# Patient Record
Sex: FEMALE | Race: WHITE | NOT HISPANIC OR LATINO | Employment: FULL TIME | URBAN - METROPOLITAN AREA
[De-identification: names, ages, dates, MRNs, and addresses within clinical notes are randomized per-mention and may not be internally consistent; named-entity substitution may affect disease eponyms.]

---

## 2018-07-31 ENCOUNTER — OFFICE VISIT (OUTPATIENT)
Dept: OBSTETRICS AND GYNECOLOGY | Facility: CLINIC | Age: 28
End: 2018-07-31
Payer: COMMERCIAL

## 2018-07-31 VITALS
HEIGHT: 65 IN | WEIGHT: 144.81 LBS | SYSTOLIC BLOOD PRESSURE: 112 MMHG | BODY MASS INDEX: 24.12 KG/M2 | DIASTOLIC BLOOD PRESSURE: 72 MMHG

## 2018-07-31 DIAGNOSIS — Z80.3 FAMILY HISTORY OF BREAST CANCER: ICD-10-CM

## 2018-07-31 DIAGNOSIS — Z01.419 WELL WOMAN EXAM WITH ROUTINE GYNECOLOGICAL EXAM: Primary | ICD-10-CM

## 2018-07-31 DIAGNOSIS — Z80.0 FAMILY HISTORY OF COLON CANCER IN FATHER: ICD-10-CM

## 2018-07-31 PROCEDURE — 99999 PR PBB SHADOW E&M-NEW PATIENT-LVL III: CPT | Mod: PBBFAC,,, | Performed by: OBSTETRICS & GYNECOLOGY

## 2018-07-31 PROCEDURE — 99385 PREV VISIT NEW AGE 18-39: CPT | Mod: S$GLB,,, | Performed by: OBSTETRICS & GYNECOLOGY

## 2018-07-31 NOTE — PROGRESS NOTES
History & Physical  Gynecology      SUBJECTIVE:     Chief Complaint: Annual Exam       History of Present Illness:  Annual Exam-Premenopausal  Patient presents for annual exam. The patient has no complaints today. Menstrual cycles are irregular with hugo.  The patient is sexually active. GYN screening history: last pap: approximate date  and was normal. The patient participates in regular exercise: yes.  The patient does not smoke.      Contraception: IUD, Aug 2016    FH:  Breast cancer: maternal aunt/grandmother  Colon cancer: Dad age 40  Ovarian cancer: none    Review of patient's allergies indicates:  No Known Allergies    Past Medical History:   Diagnosis Date    Abnormal Pap smear of cervix      Past Surgical History:   Procedure Laterality Date    WISDOM TOOTH EXTRACTION       OB History      Para Term  AB Living    0 0 0 0 0 0    SAB TAB Ectopic Multiple Live Births    0 0 0 0 0        Family History   Problem Relation Age of Onset    Breast cancer Maternal Grandmother     Colon cancer Father     Diabetes Father     Hypertension Father     Breast cancer Maternal Aunt     Eclampsia Neg Hx     Stroke Neg Hx     Miscarriages / Stillbirths Neg Hx     Ovarian cancer Neg Hx      labor Neg Hx      Social History   Substance Use Topics    Smoking status: Never Smoker    Smokeless tobacco: Never Used    Alcohol use Yes       No current outpatient prescriptions on file.     No current facility-administered medications for this visit.          Review of Systems:  Review of Systems   Constitutional: Negative for activity change, appetite change and fever.   Respiratory: Negative for shortness of breath.    Cardiovascular: Negative for chest pain.   Gastrointestinal: Negative for abdominal pain, constipation, diarrhea, nausea and vomiting.   Genitourinary: Negative for menorrhagia, menstrual problem, pelvic pain, vaginal bleeding, vaginal discharge and vaginal pain.   Neurological:  Negative for numbness and headaches.   Breast: Negative for breast pain and nipple discharge       OBJECTIVE:     Physical Exam:  Physical Exam   Constitutional: She is oriented to person, place, and time. She appears well-developed and well-nourished.   Neck: Normal range of motion. Neck supple. No tracheal deviation present. No thyromegaly present.   Cardiovascular: Normal rate, regular rhythm and normal heart sounds.    Pulmonary/Chest: Effort normal and breath sounds normal. Right breast exhibits no inverted nipple, no mass, no nipple discharge, no skin change and no tenderness. Left breast exhibits no inverted nipple, no mass, no nipple discharge, no skin change and no tenderness. Breasts are symmetrical.   Abdominal: Soft.   Genitourinary: Vagina normal and uterus normal. No labial fusion. There is no rash, tenderness, lesion or injury on the right labia. There is no rash, tenderness, lesion or injury on the left labia. Uterus is not deviated, not enlarged, not fixed and not tender. Cervix exhibits no motion tenderness, no discharge and no friability. Right adnexum displays no mass, no tenderness and no fullness. Left adnexum displays no mass, no tenderness and no fullness. No erythema, tenderness or bleeding in the vagina. No foreign body in the vagina. No signs of injury around the vagina. No vaginal discharge found.   Genitourinary Comments: Urethra: normal appearing urethra with no masses, tenderness or lesions  Urethral meatus: normal size, anterior vaginal wall with no prolapse, no lesions   Neurological: She is alert and oriented to person, place, and time.   Psychiatric: She has a normal mood and affect. Her behavior is normal. Judgment and thought content normal.   Nursing note and vitals reviewed.      Chaperoned by: Wilbert    ASSESSMENT:       ICD-10-CM ICD-9-CM    1. Well woman exam with routine gynecological exam Z01.419 V72.31    2. Family history of colon cancer in father Z80.0 V16.0    3.  Family history of breast cancer Z80.3 V16.3           Plan:      Shira was seen today for annual exam.    Diagnoses and all orders for this visit:    Well woman exam with routine gynecological exam    Family history of colon cancer in father    Family history of breast cancer        No orders of the defined types were placed in this encounter.      Well Woman:  - Pap smear up to date  - Birth control: hugo  - GC/CT:n/a  - Mammogram: due age 41- discussed TC calculation at age 31  - Smoking cessation: n/a  - Labs: none required   - Vaccines: HPV completed  - Exercise counseling    FH of colon cancer:  - discussed early screening with colonoscopy at age 41; patient aware    Follow up in one year for annual or prn.    Kayce Fong

## 2018-08-15 ENCOUNTER — TELEPHONE (OUTPATIENT)
Dept: INTERNAL MEDICINE | Facility: CLINIC | Age: 28
End: 2018-08-15

## 2018-08-15 NOTE — TELEPHONE ENCOUNTER
lvm for pt to call office back in regards of   Relocation of appt on 8/23/18.    Address and number is in appt notes

## 2018-08-23 ENCOUNTER — OFFICE VISIT (OUTPATIENT)
Dept: PRIMARY CARE CLINIC | Facility: CLINIC | Age: 28
End: 2018-08-23
Attending: FAMILY MEDICINE
Payer: COMMERCIAL

## 2018-08-23 VITALS
OXYGEN SATURATION: 98 % | BODY MASS INDEX: 24.24 KG/M2 | HEART RATE: 54 BPM | DIASTOLIC BLOOD PRESSURE: 72 MMHG | WEIGHT: 145.5 LBS | HEIGHT: 65 IN | SYSTOLIC BLOOD PRESSURE: 124 MMHG

## 2018-08-23 DIAGNOSIS — J38.2 VOCAL NODULES IN ADULTS: ICD-10-CM

## 2018-08-23 DIAGNOSIS — K21.9 GASTROESOPHAGEAL REFLUX DISEASE, ESOPHAGITIS PRESENCE NOT SPECIFIED: ICD-10-CM

## 2018-08-23 DIAGNOSIS — Z82.49 FAMILY HISTORY OF EARLY CAD: ICD-10-CM

## 2018-08-23 DIAGNOSIS — M79.646 THUMB PAIN, UNSPECIFIED LATERALITY: ICD-10-CM

## 2018-08-23 DIAGNOSIS — S63.630A SPRAIN OF INTERPHALANGEAL JOINT OF RIGHT INDEX FINGER, INITIAL ENCOUNTER: ICD-10-CM

## 2018-08-23 DIAGNOSIS — M20.41 HAMMER TOE OF RIGHT FOOT: ICD-10-CM

## 2018-08-23 DIAGNOSIS — Z00.00 ANNUAL PHYSICAL EXAM: Primary | ICD-10-CM

## 2018-08-23 DIAGNOSIS — Z80.0 FAMILY HISTORY OF COLON CANCER: ICD-10-CM

## 2018-08-23 PROCEDURE — 99385 PREV VISIT NEW AGE 18-39: CPT | Mod: S$GLB,,, | Performed by: FAMILY MEDICINE

## 2018-08-23 PROCEDURE — 99999 PR PBB SHADOW E&M-EST. PATIENT-LVL III: CPT | Mod: PBBFAC,,, | Performed by: FAMILY MEDICINE

## 2018-08-23 NOTE — PROGRESS NOTES
"Subjective:      Patient ID: Shira sAhton is a 27 y.o. female.    Chief Complaint: Annual Exam    HPI   She is here for annual exam. She does play volleyball 2-3 hours three times a week. She also does run occasionally. She does bike 10-12 miles weekly. She has noticed cramping in her hands and feet with certain activities. She has noticed for instance cutting a steak puts pressure on her finger. Too much phone use hurts her thumb. She has noticed the 2nd toe of her right foot it does hurt with walking long periods. She has noticed this for the last 6 months. Her feet have been bothering her the last 1.5 years. No injury or trauma to the hands or feet. She does not take medicine for it and ignores the achiness.     Review of Systems   Constitutional: Negative for activity change, appetite change, chills, diaphoresis, fatigue, fever and unexpected weight change.   HENT: Negative for congestion, ear discharge, ear pain, hearing loss, postnasal drip, rhinorrhea, sinus pressure and sore throat.    Respiratory: Negative for cough, shortness of breath and wheezing.    Cardiovascular: Negative for chest pain.   Gastrointestinal: Negative for abdominal pain, constipation, diarrhea, nausea and vomiting.   Genitourinary: Negative for dysuria and frequency.   Musculoskeletal: Positive for arthralgias.   Psychiatric/Behavioral: Negative for suicidal ideas.     I personally reviewed Past Medical History, Past Surgical history,  Past Social History and Family History      Objective:   /72   Pulse (!) 54   Ht 5' 5" (1.651 m)   Wt 66 kg (145 lb 8.1 oz)   LMP 07/24/2018   SpO2 98%   BMI 24.21 kg/m²     Physical Exam   Constitutional: She is oriented to person, place, and time. She appears well-developed and well-nourished. No distress.   HENT:   Head: Normocephalic and atraumatic.   Right Ear: Hearing, tympanic membrane, external ear and ear canal normal.   Left Ear: Hearing, tympanic membrane, external ear and ear canal " normal.   Nose: Nose normal.   Mouth/Throat: Uvula is midline and oropharynx is clear and moist. No oropharyngeal exudate.   Eyes: Conjunctivae and EOM are normal. Pupils are equal, round, and reactive to light. Right eye exhibits no discharge. Left eye exhibits no discharge. No scleral icterus.   Neck: Normal range of motion. Neck supple.   Cardiovascular: Normal rate, regular rhythm, normal heart sounds and intact distal pulses. Exam reveals no gallop.   No murmur heard.  Pulmonary/Chest: Effort normal and breath sounds normal. No respiratory distress. She has no wheezes. She has no rales. She exhibits no tenderness.   Abdominal: Soft. Bowel sounds are normal. She exhibits no distension and no mass. There is no tenderness. There is no rebound and no guarding.   Musculoskeletal:        Right wrist: Normal.        Left wrist: Normal.        Right foot: There is bony tenderness. There is normal range of motion, no tenderness, no swelling, normal capillary refill, no crepitus, no deformity and no laceration.   TTP over right 2nd toe, normal ROM, no erythema or edema    Neurological: She is alert and oriented to person, place, and time.   Skin: Skin is warm and dry.   Vitals reviewed.      1. Annual physical exam    2. Gastroesophageal reflux disease, esophagitis presence not specified    3. Family history of colon cancer    4. Vocal nodules in adults    5. Family history of early CAD    6. Sprain of interphalangeal joint of right index finger, initial encounter    7. Thumb pain, unspecified laterality    8. Hammer toe of right foot        1. Labs ordered and paperwork to be completed once done    2. stable, uses otc medication prn   3. Due in 4 years to complete colonoscopy   4. Stable, no intervention needed at this time   5. Stable,discussed cardiology follow up and cardiac work up, she will think about it   6. stable, handout of exercises given, patient declined mobic    7. stable,  handout of exercises given,  patient declined mobic and ortho follow up   8. stable, declined mobid and podiatry referral and will call with any worsening

## 2018-08-24 ENCOUNTER — LAB VISIT (OUTPATIENT)
Dept: LAB | Facility: HOSPITAL | Age: 28
End: 2018-08-24
Attending: FAMILY MEDICINE
Payer: COMMERCIAL

## 2018-08-24 DIAGNOSIS — Z00.00 ANNUAL PHYSICAL EXAM: ICD-10-CM

## 2018-08-24 LAB
25(OH)D3+25(OH)D2 SERPL-MCNC: 27 NG/ML
ALBUMIN SERPL BCP-MCNC: 4.1 G/DL
ALP SERPL-CCNC: 61 U/L
ALT SERPL W/O P-5'-P-CCNC: 13 U/L
ANION GAP SERPL CALC-SCNC: 6 MMOL/L
AST SERPL-CCNC: 16 U/L
BASOPHILS # BLD AUTO: 0.04 K/UL
BASOPHILS NFR BLD: 0.6 %
BILIRUB SERPL-MCNC: 0.7 MG/DL
BUN SERPL-MCNC: 12 MG/DL
CALCIUM SERPL-MCNC: 9.3 MG/DL
CHLORIDE SERPL-SCNC: 105 MMOL/L
CHOLEST SERPL-MCNC: 183 MG/DL
CHOLEST/HDLC SERPL: 3.5 {RATIO}
CO2 SERPL-SCNC: 27 MMOL/L
CREAT SERPL-MCNC: 0.9 MG/DL
DIFFERENTIAL METHOD: ABNORMAL
EOSINOPHIL # BLD AUTO: 0.1 K/UL
EOSINOPHIL NFR BLD: 0.9 %
ERYTHROCYTE [DISTWIDTH] IN BLOOD BY AUTOMATED COUNT: 11.1 %
EST. GFR  (AFRICAN AMERICAN): >60 ML/MIN/1.73 M^2
EST. GFR  (NON AFRICAN AMERICAN): >60 ML/MIN/1.73 M^2
GLUCOSE SERPL-MCNC: 89 MG/DL
HCT VFR BLD AUTO: 39.7 %
HDLC SERPL-MCNC: 53 MG/DL
HDLC SERPL: 29 %
HGB BLD-MCNC: 13.7 G/DL
IMM GRANULOCYTES # BLD AUTO: 0.01 K/UL
IMM GRANULOCYTES NFR BLD AUTO: 0.2 %
LDLC SERPL CALC-MCNC: 112.4 MG/DL
LYMPHOCYTES # BLD AUTO: 2.7 K/UL
LYMPHOCYTES NFR BLD: 42.4 %
MCH RBC QN AUTO: 33.9 PG
MCHC RBC AUTO-ENTMCNC: 34.5 G/DL
MCV RBC AUTO: 98 FL
MONOCYTES # BLD AUTO: 0.5 K/UL
MONOCYTES NFR BLD: 7.8 %
NEUTROPHILS # BLD AUTO: 3 K/UL
NEUTROPHILS NFR BLD: 48.1 %
NONHDLC SERPL-MCNC: 130 MG/DL
NRBC BLD-RTO: 0 /100 WBC
PLATELET # BLD AUTO: 265 K/UL
PMV BLD AUTO: 10.1 FL
POTASSIUM SERPL-SCNC: 4.2 MMOL/L
PROT SERPL-MCNC: 6.7 G/DL
RBC # BLD AUTO: 4.04 M/UL
SODIUM SERPL-SCNC: 138 MMOL/L
TRIGL SERPL-MCNC: 88 MG/DL
WBC # BLD AUTO: 6.32 K/UL

## 2018-08-24 PROCEDURE — 82306 VITAMIN D 25 HYDROXY: CPT

## 2018-08-24 PROCEDURE — 36415 COLL VENOUS BLD VENIPUNCTURE: CPT | Mod: PN

## 2018-08-24 PROCEDURE — 85025 COMPLETE CBC W/AUTO DIFF WBC: CPT

## 2018-08-24 PROCEDURE — 80053 COMPREHEN METABOLIC PANEL: CPT

## 2018-08-24 PROCEDURE — 80061 LIPID PANEL: CPT

## 2019-01-03 ENCOUNTER — LAB VISIT (OUTPATIENT)
Dept: LAB | Facility: HOSPITAL | Age: 29
End: 2019-01-03
Attending: OBSTETRICS & GYNECOLOGY
Payer: COMMERCIAL

## 2019-01-03 ENCOUNTER — OFFICE VISIT (OUTPATIENT)
Dept: OBSTETRICS AND GYNECOLOGY | Facility: CLINIC | Age: 29
End: 2019-01-03
Payer: COMMERCIAL

## 2019-01-03 VITALS
BODY MASS INDEX: 26.05 KG/M2 | WEIGHT: 152.56 LBS | HEIGHT: 64 IN | DIASTOLIC BLOOD PRESSURE: 78 MMHG | SYSTOLIC BLOOD PRESSURE: 122 MMHG

## 2019-01-03 DIAGNOSIS — N89.8 VAGINAL LESION: ICD-10-CM

## 2019-01-03 DIAGNOSIS — N89.8 VAGINAL LESION: Primary | ICD-10-CM

## 2019-01-03 PROCEDURE — 99214 PR OFFICE/OUTPT VISIT, EST, LEVL IV, 30-39 MIN: ICD-10-PCS | Mod: S$GLB,,, | Performed by: OBSTETRICS & GYNECOLOGY

## 2019-01-03 PROCEDURE — 3008F BODY MASS INDEX DOCD: CPT | Mod: CPTII,S$GLB,, | Performed by: OBSTETRICS & GYNECOLOGY

## 2019-01-03 PROCEDURE — 99214 OFFICE O/P EST MOD 30 MIN: CPT | Mod: S$GLB,,, | Performed by: OBSTETRICS & GYNECOLOGY

## 2019-01-03 PROCEDURE — 3008F PR BODY MASS INDEX (BMI) DOCUMENTED: ICD-10-PCS | Mod: CPTII,S$GLB,, | Performed by: OBSTETRICS & GYNECOLOGY

## 2019-01-03 PROCEDURE — 87529 HSV DNA AMP PROBE: CPT

## 2019-01-03 NOTE — PROGRESS NOTES
Gynecology    SUBJECTIVE:     Chief Complaint: vaginal cut       History of Present Illness:  28 year old who presents with vaginal stinging.  Reports first noticing it with urination.  With examination, she noticed a small cut in her labia.  This healed, then returned again one month later.  It was associated with itchiness.  Returned again one month later, but was more of a blister and on the opposite side.  Lasted for about three days.  Patient is sexually active with one partner for the past five years.  Has no known history of herpes infection.  Is not using any new soaps/detergent, underwear, etc. Uses dove sensitive skin typically because she tends to react poorly to other soaps.    Review of Systems:  Review of Systems   Constitutional: Negative for appetite change, fever and unexpected weight change.   Genitourinary: Positive for genital sores. Negative for menorrhagia, menstrual problem, pelvic pain, vaginal bleeding, vaginal discharge and vaginal pain.        OBJECTIVE:     Physical Exam:  Physical Exam   Constitutional: She is oriented to person, place, and time. She appears well-developed and well-nourished.   Pulmonary/Chest: Effort normal.   Abdominal: Soft.   Genitourinary: Vagina normal and uterus normal. No labial fusion. There is no rash, tenderness, lesion or injury on the right labia. There is lesion on the left labia. There is no rash, tenderness or injury on the left labia. Uterus is not deviated, not enlarged, not fixed and not tender. Cervix exhibits no motion tenderness, no discharge and no friability. Right adnexum displays no mass, no tenderness and no fullness. Left adnexum displays no mass, no tenderness and no fullness. No erythema, tenderness or bleeding in the vagina. No foreign body in the vagina. No signs of injury around the vagina. No vaginal discharge found.   Genitourinary Comments: Urethra: normal appearing urethra with no masses, tenderness or lesions  Urethral meatus: normal  size, anterior vaginal wall with no prolapse, no lesions  Vulva: left perineal lesions at the posterior fourichette; barely visible, but tender on palpation.    Neurological: She is alert and oriented to person, place, and time.   Psychiatric: She has a normal mood and affect. Her behavior is normal. Judgment and thought content normal.   Nursing note and vitals reviewed.      Chaperoned by: Wilbert Pinedo    ASSESSMENT:       ICD-10-CM ICD-9-CM    1. Vaginal lesion N89.8 623.8 HSV by Rapid PCR, Non-Blood [facility]; Vagina          Plan:      Shria was seen today for vaginal cut.    Diagnoses and all orders for this visit:    Vaginal lesion  -     HSV by Rapid PCR, Non-Blood [facility]; Vagina; Future    - HSV swab done today; long discussion with patient about herpes pathology and possible false negative result; could not definitively diagnose today based on examination; however, history coincides with HSV; discussed that bloodwork may be confusing given patient has a history of oral lesions;  - discussed other possible causes such as lichen; patient to call if HSV swab is negative and another lesion develops    Orders Placed This Encounter   Procedures    HSV by Rapid PCR, Non-Blood [facility]; Vagina       Follow-up for annual or prn.    Kayce Fong

## 2019-01-04 LAB
HSV1 DNA SPEC QL NAA+PROBE: NEGATIVE
HSV2 DNA SPEC QL NAA+PROBE: NEGATIVE
SPECIMEN SOURCE: NORMAL

## 2019-02-15 ENCOUNTER — PATIENT MESSAGE (OUTPATIENT)
Dept: OBSTETRICS AND GYNECOLOGY | Facility: CLINIC | Age: 29
End: 2019-02-15

## 2019-07-23 ENCOUNTER — OFFICE VISIT (OUTPATIENT)
Dept: OBSTETRICS AND GYNECOLOGY | Facility: CLINIC | Age: 29
End: 2019-07-23
Payer: COMMERCIAL

## 2019-07-23 VITALS — BODY MASS INDEX: 24.65 KG/M2 | HEIGHT: 64 IN | WEIGHT: 144.38 LBS

## 2019-07-23 DIAGNOSIS — Z30.9 ENCOUNTER FOR CONTRACEPTIVE MANAGEMENT, UNSPECIFIED TYPE: ICD-10-CM

## 2019-07-23 DIAGNOSIS — Z01.419 WELL WOMAN EXAM WITH ROUTINE GYNECOLOGICAL EXAM: Primary | ICD-10-CM

## 2019-07-23 PROCEDURE — 88175 CYTOPATH C/V AUTO FLUID REDO: CPT

## 2019-07-23 PROCEDURE — 99395 PR PREVENTIVE VISIT,EST,18-39: ICD-10-PCS | Mod: S$GLB,,, | Performed by: OBSTETRICS & GYNECOLOGY

## 2019-07-23 PROCEDURE — 99999 PR PBB SHADOW E&M-EST. PATIENT-LVL II: ICD-10-PCS | Mod: PBBFAC,,, | Performed by: OBSTETRICS & GYNECOLOGY

## 2019-07-23 PROCEDURE — 99395 PREV VISIT EST AGE 18-39: CPT | Mod: S$GLB,,, | Performed by: OBSTETRICS & GYNECOLOGY

## 2019-07-23 PROCEDURE — 99999 PR PBB SHADOW E&M-EST. PATIENT-LVL II: CPT | Mod: PBBFAC,,, | Performed by: OBSTETRICS & GYNECOLOGY

## 2019-07-23 PROCEDURE — 87491 CHLMYD TRACH DNA AMP PROBE: CPT

## 2019-07-23 NOTE — PROGRESS NOTES
History & Physical  Gynecology      SUBJECTIVE:     Chief Complaint: Well Woman and Contraception       History of Present Illness:  Annual Exam-Premenopausal  Patient presents for annual exam. The patient has no complaints today. Menstrual cycles are absent- with iud.  The patient is sexually active. GYN screening history: last pap: approximate date  and was normal. The patient participates in regular exercise: yes.  The patient does not smoke.      Contraception: hugo     FH:  Breast cancer: maternal aunt, grandomother  Colon cancer: dad age 40  Ovarian cancer: n/a    Review of patient's allergies indicates:  No Known Allergies    Past Medical History:   Diagnosis Date    Abnormal Pap smear of cervix     GERD (gastroesophageal reflux disease)     Vocal nodules in adults      Past Surgical History:   Procedure Laterality Date    WISDOM TOOTH EXTRACTION       OB History        0    Para   0    Term   0       0    AB   0    Living   0       SAB   0    TAB   0    Ectopic   0    Multiple   0    Live Births   0               Family History   Problem Relation Age of Onset    Breast cancer Maternal Grandmother     Colon cancer Father 40    Diabetes Father     Hypertension Father     Coronary artery disease Father         bypass    Breast cancer Maternal Aunt     Diabetes Paternal Aunt     Coronary artery disease Paternal Aunt     Heart attacks under age 50 Paternal Aunt     Eclampsia Neg Hx     Stroke Neg Hx     Miscarriages / Stillbirths Neg Hx     Ovarian cancer Neg Hx      labor Neg Hx      Social History     Tobacco Use    Smoking status: Never Smoker    Smokeless tobacco: Never Used   Substance Use Topics    Alcohol use: Yes     Frequency: 2-4 times a month     Drinks per session: 5 or 6    Drug use: No       Current Outpatient Medications   Medication Sig    levonorgestrel (MIRENA) 20 mcg/24 hours (5 yrs) 52 mg IUD 1 Intra Uterine Device by Intrauterine route  once. for 1 dose     No current facility-administered medications for this visit.          Review of Systems:  Review of Systems   Constitutional: Negative for activity change, appetite change and fever.   Respiratory: Negative for shortness of breath.    Cardiovascular: Negative for chest pain.   Gastrointestinal: Negative for abdominal pain, constipation, diarrhea, nausea and vomiting.   Genitourinary: Negative for menorrhagia, menstrual problem, pelvic pain, vaginal bleeding, vaginal discharge and vaginal pain.   Integumentary:  Negative for nipple discharge.   Neurological: Negative for numbness and headaches.   Breast: Negative for mastodynia and nipple discharge       OBJECTIVE:     Physical Exam:  Physical Exam   Constitutional: She is oriented to person, place, and time. She appears well-developed and well-nourished.   Neck: Normal range of motion. Neck supple. No tracheal deviation present. No thyromegaly present.   Cardiovascular: Normal rate, regular rhythm and normal heart sounds.   Pulmonary/Chest: Effort normal and breath sounds normal. Right breast exhibits no inverted nipple, no mass, no nipple discharge, no skin change and no tenderness. Left breast exhibits no inverted nipple, no mass, no nipple discharge, no skin change and no tenderness. Breasts are symmetrical.   Abdominal: Soft.   Genitourinary: Vagina normal and uterus normal. No labial fusion. There is no rash, tenderness, lesion or injury on the right labia. There is no rash, tenderness, lesion or injury on the left labia. Uterus is not deviated, not enlarged, not fixed and not tender. Cervix exhibits no motion tenderness, no discharge and no friability. Right adnexum displays no mass, no tenderness and no fullness. Left adnexum displays no mass, no tenderness and no fullness. No erythema, tenderness or bleeding in the vagina. No foreign body in the vagina. No signs of injury around the vagina. No vaginal discharge found.   Genitourinary  Comments: Urethra: normal appearing urethra with no masses, tenderness or lesions  Urethral meatus: normal size, anterior vaginal wall with no prolapse, no lesions  Strings visualized    Neurological: She is alert and oriented to person, place, and time.   Psychiatric: She has a normal mood and affect. Her behavior is normal. Judgment and thought content normal.   Nursing note and vitals reviewed.      Chaperoned by: Norma    ASSESSMENT:       ICD-10-CM ICD-9-CM    1. Well woman exam with routine gynecological exam Z01.419 V72.31 Liquid-based pap smear, screening      C. trachomatis/N. gonorrhoeae by AMP DNA   2. Encounter for contraceptive management, unspecified type Z30.9 V25.9 Device Authorization Order      levonorgestrel (MIRENA) 20 mcg/24 hours (5 yrs) 52 mg IUD          Plan:      Shira was seen today for well woman and contraception.    Diagnoses and all orders for this visit:    Well woman exam with routine gynecological exam  -     Liquid-based pap smear, screening  -     C. trachomatis/N. gonorrhoeae by AMP DNA    Encounter for contraceptive management, unspecified type  -     Device Authorization Order  -     levonorgestrel (MIRENA) 20 mcg/24 hours (5 yrs) 52 mg IUD; 1 Intra Uterine Device by Intrauterine route once. for 1 dose        Orders Placed This Encounter   Procedures    C. trachomatis/N. gonorrhoeae by AMP DNA    Device Authorization Order       Well Woman:  - Pap smear done today  - Birth control: mirena- plan to replace hugo as soon as approved  - GC/CT:done today  - Mammogram: due age 41  - Smoking cessation: n/a  - Labs: none required   - Vaccines: gardasil completed  - Exercise counseling  - patient is aware of needing colonoscopy at ~31 due to     Follow up in one year for annual or prn.    Kayce Fong

## 2019-07-24 ENCOUNTER — TELEPHONE (OUTPATIENT)
Dept: PHARMACY | Facility: CLINIC | Age: 29
End: 2019-07-24

## 2019-07-24 LAB
C TRACH DNA SPEC QL NAA+PROBE: NOT DETECTED
N GONORRHOEA DNA SPEC QL NAA+PROBE: NOT DETECTED

## 2019-07-24 NOTE — TELEPHONE ENCOUNTER
Hello,     [facility] received a prescription for Mirena.   IUD's are excluded under the patient's pharmacy benefits. [facility] is unable to bill medical claims for medications.     The medication itself, and the administration of the medication, will both have to be billed under the medical benefit and may require a prior authorization. Please contact [facility] with any questions at [phone].     Thank you,  Andrew

## 2019-07-25 ENCOUNTER — PATIENT MESSAGE (OUTPATIENT)
Dept: OBSTETRICS AND GYNECOLOGY | Facility: CLINIC | Age: 29
End: 2019-07-25

## 2019-08-07 ENCOUNTER — PATIENT MESSAGE (OUTPATIENT)
Dept: OBSTETRICS AND GYNECOLOGY | Facility: CLINIC | Age: 29
End: 2019-08-07

## 2019-08-14 ENCOUNTER — OFFICE VISIT (OUTPATIENT)
Dept: PRIMARY CARE CLINIC | Facility: CLINIC | Age: 29
End: 2019-08-14
Attending: FAMILY MEDICINE
Payer: COMMERCIAL

## 2019-08-14 VITALS
HEART RATE: 60 BPM | OXYGEN SATURATION: 100 % | SYSTOLIC BLOOD PRESSURE: 104 MMHG | HEIGHT: 64 IN | BODY MASS INDEX: 25.52 KG/M2 | WEIGHT: 149.5 LBS | DIASTOLIC BLOOD PRESSURE: 64 MMHG

## 2019-08-14 DIAGNOSIS — Z00.00 ANNUAL PHYSICAL EXAM: Primary | ICD-10-CM

## 2019-08-14 DIAGNOSIS — F43.9 STRESS: ICD-10-CM

## 2019-08-14 PROCEDURE — 99999 PR PBB SHADOW E&M-EST. PATIENT-LVL III: CPT | Mod: PBBFAC,,, | Performed by: FAMILY MEDICINE

## 2019-08-14 PROCEDURE — 99395 PREV VISIT EST AGE 18-39: CPT | Mod: S$GLB,,, | Performed by: FAMILY MEDICINE

## 2019-08-14 PROCEDURE — 99395 PR PREVENTIVE VISIT,EST,18-39: ICD-10-PCS | Mod: S$GLB,,, | Performed by: FAMILY MEDICINE

## 2019-08-14 PROCEDURE — 99999 PR PBB SHADOW E&M-EST. PATIENT-LVL III: ICD-10-PCS | Mod: PBBFAC,,, | Performed by: FAMILY MEDICINE

## 2019-08-14 NOTE — PROGRESS NOTES
"Subjective:      Patient ID: Shira Ashton is a 28 y.o. female.    Chief Complaint: Annual Exam    HPI   Patient here today for annual exam. She does play volleyball once a week. She does have some stress with work, mood fluctuates, anxiety is situational, no SI or HI. She reports toe pain has improved.       Review of Systems   Constitutional: Negative for activity change, appetite change, chills, diaphoresis, fatigue, fever and unexpected weight change.   HENT: Negative for congestion, ear discharge, ear pain, hearing loss, postnasal drip, rhinorrhea, sinus pressure and sore throat.    Respiratory: Negative for cough, shortness of breath and wheezing.    Cardiovascular: Negative for chest pain.   Gastrointestinal: Negative for abdominal pain, constipation, diarrhea, nausea and vomiting.   Genitourinary: Negative for dysuria and frequency.   Musculoskeletal: Negative.    Psychiatric/Behavioral: Negative for suicidal ideas.     I personally reviewed Past Medical History, Past Surgical history,  Past Social History and Family History      Objective:   /64 (BP Location: Left arm, Patient Position: Sitting)   Pulse 60   Ht 5' 4" (1.626 m)   Wt 67.8 kg (149 lb 7.6 oz)   SpO2 100%   BMI 25.66 kg/m²     Physical Exam   Constitutional: She is oriented to person, place, and time. She appears well-developed and well-nourished. No distress.   HENT:   Head: Normocephalic and atraumatic.   Right Ear: Hearing, tympanic membrane, external ear and ear canal normal.   Left Ear: Hearing, tympanic membrane, external ear and ear canal normal.   Nose: Nose normal.   Mouth/Throat: Uvula is midline and oropharynx is clear and moist. No oropharyngeal exudate.   Eyes: Pupils are equal, round, and reactive to light. Conjunctivae and EOM are normal. Right eye exhibits no discharge. Left eye exhibits no discharge. No scleral icterus.   Neck: Normal range of motion. Neck supple.   Cardiovascular: Normal rate, regular rhythm, normal " heart sounds and intact distal pulses. Exam reveals no gallop.   No murmur heard.  Pulmonary/Chest: Effort normal and breath sounds normal. No respiratory distress. She has no wheezes. She has no rales. She exhibits no tenderness.   Abdominal: Soft. Bowel sounds are normal. She exhibits no distension and no mass. There is no tenderness. There is no rebound and no guarding.   Neurological: She is alert and oriented to person, place, and time.   Skin: Skin is warm and dry.   Vitals reviewed.      1. Annual physical exam    2. Stress        1.labs   2. Start therapy, declined medication        Orders Placed This Encounter   Procedures    Comprehensive metabolic panel    CBC auto differential    Lipid panel    Hemoglobin A1c    Ambulatory consult to Psychology

## 2019-08-14 NOTE — PATIENT INSTRUCTIONS
Call to make an appointment within [facility] for psychiatry/psychology [phone]    [facility] [address], [address]  Phone: [phone]  You can email for an appointment at: [email or web address]    [facility]   [address], [place], [address]  [email or web address]  Dr. Vida Martino, [phone] or [email or web address]   Dr. Rashmi Sanchez, [phone] or [email or web address]       Janet Hunt    LCSJOEL (therapist) [phone]   [address] [facility]   Rosaura Gonzales   LCSJOEL (therapist) [phone]   [address] [facility]   Kiera MOREW (therapist) [phone]   [address] [facility]     Jonathan Lott [phone] (therapist) [address] [facility]    Eduardo Gonzales (therapist) [phone]  [facility] Abbey Dickens (therapist) [phone] [address] [facility]     [facility] [phone]  [facility] [facility] [place] [address]    [facility] [place] [address] [address] [place] [address]  [phone]  [email or web address]

## 2019-08-15 ENCOUNTER — PROCEDURE VISIT (OUTPATIENT)
Dept: OBSTETRICS AND GYNECOLOGY | Facility: CLINIC | Age: 29
End: 2019-08-15
Payer: COMMERCIAL

## 2019-08-15 ENCOUNTER — LAB VISIT (OUTPATIENT)
Dept: LAB | Facility: HOSPITAL | Age: 29
End: 2019-08-15
Attending: FAMILY MEDICINE
Payer: COMMERCIAL

## 2019-08-15 VITALS
DIASTOLIC BLOOD PRESSURE: 70 MMHG | HEIGHT: 64 IN | SYSTOLIC BLOOD PRESSURE: 114 MMHG | BODY MASS INDEX: 24.24 KG/M2 | WEIGHT: 142 LBS

## 2019-08-15 DIAGNOSIS — Z30.9 ENCOUNTER FOR CONTRACEPTIVE MANAGEMENT, UNSPECIFIED TYPE: Primary | ICD-10-CM

## 2019-08-15 DIAGNOSIS — Z30.433 ENCOUNTER FOR IUD REMOVAL AND REINSERTION: ICD-10-CM

## 2019-08-15 DIAGNOSIS — Z00.00 ANNUAL PHYSICAL EXAM: ICD-10-CM

## 2019-08-15 LAB
ALBUMIN SERPL BCP-MCNC: 4.1 G/DL (ref 3.5–5.2)
ALP SERPL-CCNC: 56 U/L (ref 55–135)
ALT SERPL W/O P-5'-P-CCNC: 10 U/L (ref 10–44)
ANION GAP SERPL CALC-SCNC: 8 MMOL/L (ref 8–16)
AST SERPL-CCNC: 15 U/L (ref 10–40)
B-HCG UR QL: NEGATIVE
BASOPHILS # BLD AUTO: 0.04 K/UL (ref 0–0.2)
BASOPHILS NFR BLD: 0.6 % (ref 0–1.9)
BILIRUB SERPL-MCNC: 0.7 MG/DL (ref 0.1–1)
BUN SERPL-MCNC: 13 MG/DL (ref 6–20)
CALCIUM SERPL-MCNC: 9.5 MG/DL (ref 8.7–10.5)
CHLORIDE SERPL-SCNC: 107 MMOL/L (ref 95–110)
CHOLEST SERPL-MCNC: 170 MG/DL (ref 120–199)
CHOLEST/HDLC SERPL: 3 {RATIO} (ref 2–5)
CO2 SERPL-SCNC: 24 MMOL/L (ref 23–29)
CREAT SERPL-MCNC: 0.9 MG/DL (ref 0.5–1.4)
CTP QC/QA: YES
DIFFERENTIAL METHOD: ABNORMAL
EOSINOPHIL # BLD AUTO: 0.1 K/UL (ref 0–0.5)
EOSINOPHIL NFR BLD: 0.8 % (ref 0–8)
ERYTHROCYTE [DISTWIDTH] IN BLOOD BY AUTOMATED COUNT: 11 % (ref 11.5–14.5)
EST. GFR  (AFRICAN AMERICAN): >60 ML/MIN/1.73 M^2
EST. GFR  (NON AFRICAN AMERICAN): >60 ML/MIN/1.73 M^2
ESTIMATED AVG GLUCOSE: 91 MG/DL (ref 68–131)
GLUCOSE SERPL-MCNC: 95 MG/DL (ref 70–110)
HBA1C MFR BLD HPLC: 4.8 % (ref 4–5.6)
HCT VFR BLD AUTO: 39.7 % (ref 37–48.5)
HDLC SERPL-MCNC: 56 MG/DL (ref 40–75)
HDLC SERPL: 32.9 % (ref 20–50)
HGB BLD-MCNC: 13.1 G/DL (ref 12–16)
IMM GRANULOCYTES # BLD AUTO: 0.02 K/UL (ref 0–0.04)
IMM GRANULOCYTES NFR BLD AUTO: 0.3 % (ref 0–0.5)
LDLC SERPL CALC-MCNC: 97.6 MG/DL (ref 63–159)
LYMPHOCYTES # BLD AUTO: 2.7 K/UL (ref 1–4.8)
LYMPHOCYTES NFR BLD: 40.9 % (ref 18–48)
MCH RBC QN AUTO: 33.2 PG (ref 27–31)
MCHC RBC AUTO-ENTMCNC: 33 G/DL (ref 32–36)
MCV RBC AUTO: 101 FL (ref 82–98)
MONOCYTES # BLD AUTO: 0.5 K/UL (ref 0.3–1)
MONOCYTES NFR BLD: 7.8 % (ref 4–15)
NEUTROPHILS # BLD AUTO: 3.2 K/UL (ref 1.8–7.7)
NEUTROPHILS NFR BLD: 49.6 % (ref 38–73)
NONHDLC SERPL-MCNC: 114 MG/DL
NRBC BLD-RTO: 0 /100 WBC
PLATELET # BLD AUTO: 250 K/UL (ref 150–350)
PMV BLD AUTO: 10.4 FL (ref 9.2–12.9)
POTASSIUM SERPL-SCNC: 4.1 MMOL/L (ref 3.5–5.1)
PROT SERPL-MCNC: 6.6 G/DL (ref 6–8.4)
RBC # BLD AUTO: 3.95 M/UL (ref 4–5.4)
SODIUM SERPL-SCNC: 139 MMOL/L (ref 136–145)
TRIGL SERPL-MCNC: 82 MG/DL (ref 30–150)
WBC # BLD AUTO: 6.5 K/UL (ref 3.9–12.7)

## 2019-08-15 PROCEDURE — 58300 INSERTION OF IUD: ICD-10-PCS | Mod: S$GLB,,, | Performed by: OBSTETRICS & GYNECOLOGY

## 2019-08-15 PROCEDURE — 58300 INSERT INTRAUTERINE DEVICE: CPT | Mod: S$GLB,,, | Performed by: OBSTETRICS & GYNECOLOGY

## 2019-08-15 PROCEDURE — 80061 LIPID PANEL: CPT

## 2019-08-15 PROCEDURE — 83036 HEMOGLOBIN GLYCOSYLATED A1C: CPT

## 2019-08-15 PROCEDURE — 58301 REMOVE INTRAUTERINE DEVICE: CPT | Mod: 51,S$GLB,, | Performed by: OBSTETRICS & GYNECOLOGY

## 2019-08-15 PROCEDURE — 58301 PR REMOVE, INTRAUTERINE DEVICE: ICD-10-PCS | Mod: 51,S$GLB,, | Performed by: OBSTETRICS & GYNECOLOGY

## 2019-08-15 PROCEDURE — 36415 COLL VENOUS BLD VENIPUNCTURE: CPT | Mod: PO

## 2019-08-15 PROCEDURE — 81025 POCT URINE PREGNANCY: ICD-10-PCS | Mod: S$GLB,,, | Performed by: OBSTETRICS & GYNECOLOGY

## 2019-08-15 PROCEDURE — 81025 URINE PREGNANCY TEST: CPT | Mod: S$GLB,,, | Performed by: OBSTETRICS & GYNECOLOGY

## 2019-08-15 PROCEDURE — 85025 COMPLETE CBC W/AUTO DIFF WBC: CPT

## 2019-08-15 PROCEDURE — 80053 COMPREHEN METABOLIC PANEL: CPT

## 2019-08-15 NOTE — PROCEDURES
Insertion of IUD  Date/Time: 8/15/2019 3:32 PM  Performed by: Kayce Fong MD  Authorized by: Kayce Fong MD     Consent:     Consent obtained:  Written    Consent given by:  Patient    Procedure risks and benefits discussed: yes      Patient questions answered: yes      Patient agrees, verbalizes understanding, and wants to proceed: yes      Educational handouts given: yes      Instructions and paperwork completed: yes    Procedure:     Pelvic exam performed: yes      Negative urine pregnancy test: yes      Cervix cleaned and prepped: yes      Speculum placed in vagina: yes (IUD strings grasped and IUD removed without difficulty)      Tenaculum applied to cervix: yes      Uterus sounded: yes      Uterus sound depth (cm):  7    IUD inserted with no complications: yes      IUD type:  Mirena    Strings trimmed: yes    Post-procedure:     Patient tolerated procedure well: yes      Patient will follow up after next period: yes

## 2019-09-04 ENCOUNTER — PATIENT MESSAGE (OUTPATIENT)
Dept: PRIMARY CARE CLINIC | Facility: CLINIC | Age: 29
End: 2019-09-04

## 2019-09-10 ENCOUNTER — OFFICE VISIT (OUTPATIENT)
Dept: OBSTETRICS AND GYNECOLOGY | Facility: CLINIC | Age: 29
End: 2019-09-10
Payer: COMMERCIAL

## 2019-09-10 VITALS
BODY MASS INDEX: 25.44 KG/M2 | DIASTOLIC BLOOD PRESSURE: 72 MMHG | WEIGHT: 149 LBS | SYSTOLIC BLOOD PRESSURE: 112 MMHG | HEIGHT: 64 IN

## 2019-09-10 DIAGNOSIS — Z30.431 IUD CHECK UP: Primary | ICD-10-CM

## 2019-09-10 PROCEDURE — 99212 OFFICE O/P EST SF 10 MIN: CPT | Mod: S$GLB,,, | Performed by: OBSTETRICS & GYNECOLOGY

## 2019-09-10 PROCEDURE — 99999 PR PBB SHADOW E&M-EST. PATIENT-LVL III: CPT | Mod: PBBFAC,,, | Performed by: OBSTETRICS & GYNECOLOGY

## 2019-09-10 PROCEDURE — 99999 PR PBB SHADOW E&M-EST. PATIENT-LVL III: ICD-10-PCS | Mod: PBBFAC,,, | Performed by: OBSTETRICS & GYNECOLOGY

## 2019-09-10 PROCEDURE — 3008F BODY MASS INDEX DOCD: CPT | Mod: CPTII,S$GLB,, | Performed by: OBSTETRICS & GYNECOLOGY

## 2019-09-10 PROCEDURE — 99212 PR OFFICE/OUTPT VISIT, EST, LEVL II, 10-19 MIN: ICD-10-PCS | Mod: S$GLB,,, | Performed by: OBSTETRICS & GYNECOLOGY

## 2019-09-10 PROCEDURE — 3008F PR BODY MASS INDEX (BMI) DOCUMENTED: ICD-10-PCS | Mod: CPTII,S$GLB,, | Performed by: OBSTETRICS & GYNECOLOGY

## 2019-09-10 NOTE — PROGRESS NOTES
History & Physical  Gynecology      SUBJECTIVE:     Chief Complaint: IUD Check       History of Present Illness:  Ms. Ashton is a 28 y.o. female who returns 4 weeks after an IUD placement.  She has no complaints today- bleeding has been appropriate.  No problems with intercourse.  No pain, no discharge.     Review of Systems:  Review of Systems   Genitourinary: Negative for menorrhagia, menstrual problem, pelvic pain, vaginal bleeding, vaginal discharge, vaginal pain and vaginal odor.        OBJECTIVE:     Physical Exam:  Physical Exam   Constitutional: She is oriented to person, place, and time. She appears well-developed and well-nourished.   Pulmonary/Chest: Effort normal.   Genitourinary: Vagina normal. No labial fusion. There is no rash, tenderness, lesion or injury on the right labia. There is no rash, tenderness, lesion or injury on the left labia. Cervix exhibits no motion tenderness, no discharge and no friability. No erythema, tenderness or bleeding in the vagina. No foreign body in the vagina. No signs of injury around the vagina. No vaginal discharge found.   Genitourinary Comments: Strings visualized   Neurological: She is alert and oriented to person, place, and time.   Psychiatric: She has a normal mood and affect. Her behavior is normal. Judgment and thought content normal.   Nursing note and vitals reviewed.        ASSESSMENT:       ICD-10-CM ICD-9-CM    1. IUD check up Z30.431 V25.42           Plan:      Shira was seen today for iud check.    Diagnoses and all orders for this visit:    IUD check up        No orders of the defined types were placed in this encounter.      IUD strings visualized on exam.  Strings did not require trimming.    Follow up for annual exam.     Kayce Fong

## 2020-03-02 ENCOUNTER — PATIENT OUTREACH (OUTPATIENT)
Dept: ADMINISTRATIVE | Facility: OTHER | Age: 30
End: 2020-03-02

## 2020-03-02 NOTE — PROGRESS NOTES
LINKS immunization registry failed, Health Maintenance updated.  Chart reviewed for overdue Proactive [facility] Encounters health maintenance testing.

## 2020-03-04 ENCOUNTER — OFFICE VISIT (OUTPATIENT)
Dept: PODIATRY | Facility: CLINIC | Age: 30
End: 2020-03-04
Payer: COMMERCIAL

## 2020-03-04 VITALS
DIASTOLIC BLOOD PRESSURE: 74 MMHG | HEART RATE: 69 BPM | BODY MASS INDEX: 25.58 KG/M2 | HEIGHT: 64 IN | SYSTOLIC BLOOD PRESSURE: 124 MMHG

## 2020-03-04 DIAGNOSIS — M72.2 PLANTAR FASCIITIS: Primary | ICD-10-CM

## 2020-03-04 PROCEDURE — 99999 PR PBB SHADOW E&M-EST. PATIENT-LVL III: ICD-10-PCS | Mod: PBBFAC,,, | Performed by: PODIATRIST

## 2020-03-04 PROCEDURE — 99999 PR PBB SHADOW E&M-EST. PATIENT-LVL III: CPT | Mod: PBBFAC,,, | Performed by: PODIATRIST

## 2020-03-04 PROCEDURE — 3008F PR BODY MASS INDEX (BMI) DOCUMENTED: ICD-10-PCS | Mod: CPTII,S$GLB,, | Performed by: PODIATRIST

## 2020-03-04 PROCEDURE — 3008F BODY MASS INDEX DOCD: CPT | Mod: CPTII,S$GLB,, | Performed by: PODIATRIST

## 2020-03-04 PROCEDURE — 99203 PR OFFICE/OUTPT VISIT, NEW, LEVL III, 30-44 MIN: ICD-10-PCS | Mod: S$GLB,,, | Performed by: PODIATRIST

## 2020-03-04 PROCEDURE — 99203 OFFICE O/P NEW LOW 30 MIN: CPT | Mod: S$GLB,,, | Performed by: PODIATRIST

## 2020-03-04 NOTE — PROGRESS NOTES
Subjective:      Patient ID: Shira Ashton is a 29 y.o. female.    Chief Complaint: Foot Problem (after working out) and Foot Pain    Shira is a 29 y.o. female who presents to the clinic complaining of heel pain in both feet, especially with the first step in the morning. The pain is described as Aching, Tight and Deep. The onset of the pain was gradual and has worsened over the past several weeks. She denies a history of trauma. Prior treatments include none.    Review of Systems   Constitution: Negative for chills, decreased appetite and fever.   Cardiovascular: Negative for leg swelling.   Musculoskeletal: Negative for arthritis, joint pain, joint swelling and myalgias.   Gastrointestinal: Negative for nausea and vomiting.   Neurological: Negative for loss of balance, numbness and paresthesias.               Patient Active Problem List   Diagnosis    Family history of colon cancer in father    Family history of breast cancer    GERD (gastroesophageal reflux disease)    Vocal nodules in adults       Current Outpatient Medications on File Prior to Visit   Medication Sig Dispense Refill    levonorgestrel (MIRENA) 20 mcg/24 hours (5 yrs) 52 mg IUD 1 Intra Uterine Device by Intrauterine route once. for 1 dose 1 Intra Uterine Device 0     Current Facility-Administered Medications on File Prior to Visit   Medication Dose Route Frequency Provider Last Rate Last Dose    levonorgestrel 20 mcg/24 hours (5 yrs) 52 mg IUD 1 Intra Uterine Device  1 Intra Uterine Device Intrauterine  Kayce Fong MD   1 Intra Uterine Device at 08/15/19 1272       Review of patient's allergies indicates:  No Known Allergies    Past Surgical History:   Procedure Laterality Date    WISDOM TOOTH EXTRACTION         Family History   Problem Relation Age of Onset    Breast cancer Maternal Grandmother     Colon cancer Father 40    Diabetes Father     Hypertension Father     Coronary artery disease Father         bypass     "Psoriasis Father         psoriatic arthritis     Breast cancer Maternal Aunt     Diabetes Paternal Aunt     Coronary artery disease Paternal Aunt     Heart attacks under age 50 Paternal Aunt     Eclampsia Neg Hx     Stroke Neg Hx     Miscarriages / Stillbirths Neg Hx     Ovarian cancer Neg Hx      labor Neg Hx        Social History     Socioeconomic History    Marital status: Single     Spouse name: Not on file    Number of children: Not on file    Years of education: Not on file    Highest education level: Not on file   Occupational History    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    Social Needs    Financial resource strain: Not on file    Food insecurity:     Worry: Not on file     Inability: Not on file    Transportation needs:     Medical: Not on file     Non-medical: Not on file   Tobacco Use    Smoking status: Never Smoker    Smokeless tobacco: Never Used   Substance and Sexual Activity    Alcohol use: Yes     Frequency: 2-4 times a month     Drinks per session: 5 or 6    Drug use: No    Sexual activity: Yes     Partners: Male     Birth control/protection: IUD   Lifestyle    Physical activity:     Days per week: Not on file     Minutes per session: Not on file    Stress: Not on file   Relationships    Social connections:     Talks on phone: Not on file     Gets together: Not on file     Attends Anabaptism service: Not on file     Active member of club or organization: Not on file     Attends meetings of clubs or organizations: Not on file     Relationship status: Not on file   Other Topics Concern    Not on file   Social History Narrative    Not on file           Objective:       Vitals:    20 1447   BP: 124/74   Pulse: 69   Height: 5' 4" (1.626 m)   PainSc:   4   PainLoc: Foot        Physical Exam   Constitutional: She is oriented to person, place, and time. She appears well-developed and well-nourished.   Cardiovascular:   Pulses:       Dorsalis pedis pulses are 2+ on " the right side, and 2+ on the left side.        Posterior tibial pulses are 2+ on the right side, and 2+ on the left side.   Musculoskeletal: She exhibits tenderness (b/l heel). She exhibits no edema.        Right ankle: Normal.        Left ankle: Normal.        Right foot: There is no swelling, no crepitus and no deformity.        Left foot: There is no swelling, no crepitus and no deformity.   Adequate joint range of motion without pain, limitation, nor crepitation Bilateral feet and ankle joints. Muscle strength is 5/5 in all groups bilaterally.       Pain on palpation of b/l L>>R  plantar heel consistent with location of patient's chief complaint. Negative Tinel's sign. No pain with lateral compression of heels.         Lymphadenopathy:   No palpable lymph nodes   Neurological: She is alert and oriented to person, place, and time. She has normal strength.   Skin: Skin is warm, dry and intact. No rash noted. No erythema. Nails show no clubbing.   Psychiatric: She has a normal mood and affect. Her behavior is normal.             Assessment:       Encounter Diagnosis   Name Primary?    Plantar fasciitis Yes         Plan:       Shira was seen today for foot problem and foot pain.    Diagnoses and all orders for this visit:    Plantar fasciitis      I counseled the patient on her conditions, their implications and medical management.      Discussed different treatment options for heel pain. including conservative and interventional.  I gave written and verbal instructions on heel cord stretching and this was demonstrated for the patient. Patient expressed understanding. Discussed wearing appropriate shoe gear and avoiding flats, slippers, sandals, barefoot, and sockfeet. Recommended arch supports. My recommendation for OTC supports is Spenco polysorb replacement insoles or patient may elect more aggressive treatment with prescription arch supports. We also discussed cortisone injections and NSAID  therapy.    Patient instructed on adequate icing techniques. Patient should ice the affected area at least once per day x 10 minutes for 10 days . I advised the  patient that extra icing would also be beneficial to ensure adequate anti inflammatory effect     Stretching handout dispensed to patient. Instructions on adequate stretching reviewed in clinic      Handout dispensed with information in regards to several type of Spenco Inserts along with purchasing information.    OTC NSAIDs prn pain as per package instructions     .

## 2020-10-20 ENCOUNTER — PATIENT MESSAGE (OUTPATIENT)
Dept: OBSTETRICS AND GYNECOLOGY | Facility: CLINIC | Age: 30
End: 2020-10-20

## 2020-11-17 ENCOUNTER — TELEPHONE (OUTPATIENT)
Dept: FAMILY MEDICINE | Facility: CLINIC | Age: 30
End: 2020-11-17

## 2020-11-18 ENCOUNTER — PATIENT MESSAGE (OUTPATIENT)
Dept: FAMILY MEDICINE | Facility: CLINIC | Age: 30
End: 2020-11-18

## 2020-11-18 ENCOUNTER — PATIENT MESSAGE (OUTPATIENT)
Dept: OBSTETRICS AND GYNECOLOGY | Facility: CLINIC | Age: 30
End: 2020-11-18

## 2020-11-20 ENCOUNTER — LAB VISIT (OUTPATIENT)
Dept: LAB | Facility: HOSPITAL | Age: 30
End: 2020-11-20
Attending: STUDENT IN AN ORGANIZED HEALTH CARE EDUCATION/TRAINING PROGRAM
Payer: COMMERCIAL

## 2020-11-20 ENCOUNTER — OFFICE VISIT (OUTPATIENT)
Dept: FAMILY MEDICINE | Facility: CLINIC | Age: 30
End: 2020-11-20
Payer: COMMERCIAL

## 2020-11-20 VITALS
TEMPERATURE: 98 F | SYSTOLIC BLOOD PRESSURE: 112 MMHG | WEIGHT: 153.25 LBS | BODY MASS INDEX: 26.16 KG/M2 | HEIGHT: 64 IN | RESPIRATION RATE: 16 BRPM | HEART RATE: 70 BPM | DIASTOLIC BLOOD PRESSURE: 64 MMHG | OXYGEN SATURATION: 98 %

## 2020-11-20 DIAGNOSIS — D53.9 MACROCYTIC ANEMIA: ICD-10-CM

## 2020-11-20 DIAGNOSIS — E55.9 VITAMIN D DEFICIENCY: ICD-10-CM

## 2020-11-20 DIAGNOSIS — Z00.00 ROUTINE HEALTH MAINTENANCE: Primary | ICD-10-CM

## 2020-11-20 DIAGNOSIS — Z80.0 FAMILY HISTORY OF COLON CANCER IN FATHER: ICD-10-CM

## 2020-11-20 DIAGNOSIS — Z00.00 ROUTINE HEALTH MAINTENANCE: ICD-10-CM

## 2020-11-20 DIAGNOSIS — K21.9 GASTROESOPHAGEAL REFLUX DISEASE, UNSPECIFIED WHETHER ESOPHAGITIS PRESENT: ICD-10-CM

## 2020-11-20 LAB
25(OH)D3+25(OH)D2 SERPL-MCNC: 26 NG/ML (ref 30–96)
ALBUMIN SERPL BCP-MCNC: 4.6 G/DL (ref 3.5–5.2)
ALP SERPL-CCNC: 59 U/L (ref 55–135)
ALT SERPL W/O P-5'-P-CCNC: 11 U/L (ref 10–44)
ANION GAP SERPL CALC-SCNC: 6 MMOL/L (ref 8–16)
AST SERPL-CCNC: 13 U/L (ref 10–40)
BASOPHILS # BLD AUTO: 0.04 K/UL (ref 0–0.2)
BASOPHILS NFR BLD: 0.8 % (ref 0–1.9)
BILIRUB SERPL-MCNC: 1.3 MG/DL (ref 0.1–1)
BUN SERPL-MCNC: 11 MG/DL (ref 6–20)
CALCIUM SERPL-MCNC: 9.8 MG/DL (ref 8.7–10.5)
CHLORIDE SERPL-SCNC: 104 MMOL/L (ref 95–110)
CHOLEST SERPL-MCNC: 194 MG/DL (ref 120–199)
CHOLEST/HDLC SERPL: 3.7 {RATIO} (ref 2–5)
CO2 SERPL-SCNC: 29 MMOL/L (ref 23–29)
CREAT SERPL-MCNC: 0.9 MG/DL (ref 0.5–1.4)
DIFFERENTIAL METHOD: ABNORMAL
EOSINOPHIL # BLD AUTO: 0 K/UL (ref 0–0.5)
EOSINOPHIL NFR BLD: 0.6 % (ref 0–8)
ERYTHROCYTE [DISTWIDTH] IN BLOOD BY AUTOMATED COUNT: 11.3 % (ref 11.5–14.5)
EST. GFR  (AFRICAN AMERICAN): >60 ML/MIN/1.73 M^2
EST. GFR  (NON AFRICAN AMERICAN): >60 ML/MIN/1.73 M^2
FERRITIN SERPL-MCNC: 114 NG/ML (ref 20–300)
FOLATE SERPL-MCNC: 10.2 NG/ML (ref 4–24)
GLUCOSE SERPL-MCNC: 91 MG/DL (ref 70–110)
HCT VFR BLD AUTO: 44.1 % (ref 37–48.5)
HDLC SERPL-MCNC: 53 MG/DL (ref 40–75)
HDLC SERPL: 27.3 % (ref 20–50)
HGB BLD-MCNC: 14.4 G/DL (ref 12–16)
IMM GRANULOCYTES # BLD AUTO: 0.01 K/UL (ref 0–0.04)
IMM GRANULOCYTES NFR BLD AUTO: 0.2 % (ref 0–0.5)
LDLC SERPL CALC-MCNC: 119.6 MG/DL (ref 63–159)
LYMPHOCYTES # BLD AUTO: 2.1 K/UL (ref 1–4.8)
LYMPHOCYTES NFR BLD: 42.3 % (ref 18–48)
MCH RBC QN AUTO: 33.3 PG (ref 27–31)
MCHC RBC AUTO-ENTMCNC: 32.7 G/DL (ref 32–36)
MCV RBC AUTO: 102 FL (ref 82–98)
MONOCYTES # BLD AUTO: 0.4 K/UL (ref 0.3–1)
MONOCYTES NFR BLD: 8.3 % (ref 4–15)
NEUTROPHILS # BLD AUTO: 2.4 K/UL (ref 1.8–7.7)
NEUTROPHILS NFR BLD: 47.8 % (ref 38–73)
NONHDLC SERPL-MCNC: 141 MG/DL
NRBC BLD-RTO: 0 /100 WBC
PLATELET # BLD AUTO: 294 K/UL (ref 150–350)
PMV BLD AUTO: 10.2 FL (ref 9.2–12.9)
POTASSIUM SERPL-SCNC: 4.1 MMOL/L (ref 3.5–5.1)
PROT SERPL-MCNC: 7.4 G/DL (ref 6–8.4)
RBC # BLD AUTO: 4.32 M/UL (ref 4–5.4)
SODIUM SERPL-SCNC: 139 MMOL/L (ref 136–145)
TRIGL SERPL-MCNC: 107 MG/DL (ref 30–150)
TSH SERPL DL<=0.005 MIU/L-ACNC: 1.72 UIU/ML (ref 0.4–4)
VIT B12 SERPL-MCNC: 313 PG/ML (ref 210–950)
WBC # BLD AUTO: 4.96 K/UL (ref 3.9–12.7)

## 2020-11-20 PROCEDURE — 84443 ASSAY THYROID STIM HORMONE: CPT

## 2020-11-20 PROCEDURE — 36415 COLL VENOUS BLD VENIPUNCTURE: CPT | Mod: PO

## 2020-11-20 PROCEDURE — 82607 VITAMIN B-12: CPT

## 2020-11-20 PROCEDURE — 99213 PR OFFICE/OUTPT VISIT, EST, LEVL III, 20-29 MIN: ICD-10-PCS | Mod: S$GLB,,, | Performed by: STUDENT IN AN ORGANIZED HEALTH CARE EDUCATION/TRAINING PROGRAM

## 2020-11-20 PROCEDURE — 99213 OFFICE O/P EST LOW 20 MIN: CPT | Mod: S$GLB,,, | Performed by: STUDENT IN AN ORGANIZED HEALTH CARE EDUCATION/TRAINING PROGRAM

## 2020-11-20 PROCEDURE — 82746 ASSAY OF FOLIC ACID SERUM: CPT

## 2020-11-20 PROCEDURE — 99999 PR PBB SHADOW E&M-EST. PATIENT-LVL IV: ICD-10-PCS | Mod: PBBFAC,,, | Performed by: STUDENT IN AN ORGANIZED HEALTH CARE EDUCATION/TRAINING PROGRAM

## 2020-11-20 PROCEDURE — 82728 ASSAY OF FERRITIN: CPT

## 2020-11-20 PROCEDURE — 1126F AMNT PAIN NOTED NONE PRSNT: CPT | Mod: S$GLB,,, | Performed by: STUDENT IN AN ORGANIZED HEALTH CARE EDUCATION/TRAINING PROGRAM

## 2020-11-20 PROCEDURE — 1126F PR PAIN SEVERITY QUANTIFIED, NO PAIN PRESENT: ICD-10-PCS | Mod: S$GLB,,, | Performed by: STUDENT IN AN ORGANIZED HEALTH CARE EDUCATION/TRAINING PROGRAM

## 2020-11-20 PROCEDURE — 85025 COMPLETE CBC W/AUTO DIFF WBC: CPT

## 2020-11-20 PROCEDURE — 80061 LIPID PANEL: CPT

## 2020-11-20 PROCEDURE — 3008F PR BODY MASS INDEX (BMI) DOCUMENTED: ICD-10-PCS | Mod: CPTII,S$GLB,, | Performed by: STUDENT IN AN ORGANIZED HEALTH CARE EDUCATION/TRAINING PROGRAM

## 2020-11-20 PROCEDURE — 99999 PR PBB SHADOW E&M-EST. PATIENT-LVL IV: CPT | Mod: PBBFAC,,, | Performed by: STUDENT IN AN ORGANIZED HEALTH CARE EDUCATION/TRAINING PROGRAM

## 2020-11-20 PROCEDURE — 83921 ORGANIC ACID SINGLE QUANT: CPT

## 2020-11-20 PROCEDURE — 80053 COMPREHEN METABOLIC PANEL: CPT

## 2020-11-20 PROCEDURE — 3008F BODY MASS INDEX DOCD: CPT | Mod: CPTII,S$GLB,, | Performed by: STUDENT IN AN ORGANIZED HEALTH CARE EDUCATION/TRAINING PROGRAM

## 2020-11-20 PROCEDURE — 82306 VITAMIN D 25 HYDROXY: CPT

## 2020-11-20 NOTE — PROGRESS NOTES
[facility] Note  Subjective:    Chief Complaint:   Chief Complaint   Patient presents with    Annual Exam       History of Present Illness:  30 y.o. female presents for multiple issues.      Macrocytic anemia-elevated MCV she has good healthy diet and will check B12 folate and methylmalonic acid since MMA is most sensitive marker.  Also get TSH as well.   Could will and controlled with diet modifications only recommended continue refer cautions and will monitor for if need of medication   Vitamin-D deficiency-patient had vitamin-D deficiency in the past reports that she does have sun exposure but she uses sunscreen thus will check levels and recommend supplement if low for good bone health   Preventive-patient's father had colon cancer diagnosed at 41 thus she will need colonoscopy at 32 and recommended this strongly.  She denied blood black in her stool and she has no weight loss fevers or night sweats.  Will check yearly blood counts as well.  She is up-to-date on Pap smear and is following this.  She would not want the flu vaccination this year.    This is the extent of the patient's complaints at this present time.     She denies chest pain upon exertion, dyspnea, nausea, vomiting, diaphoresis, and syncope. No pleuritic chest pain, unilateral leg swelling, calf tenderness, or calf pain.     Father had colon cancer when he was 41    The following portions of the patient's history were reviewed and updated as appropriate: allergies, current medications, past family history, past medical history, past social history, past surgical history and problem list.    Review of Systems [Negative unless checked off]  Gen ROS: []fever []chills []weight loss []malaise []fatigue   Neuro: []dizzy []numbness []LOC []weakness []headaches   Psych ROS: []hallucinate [] depression []anxiety []suicidal ideation    ENT ROS: []congestion []rhinorrhea []sore throat []neck pain []hearing loss   Eye ROS: []hazy vision  "[] diplopia []photophobia []eye pain    Pulm ROS: []cough []pleuritic pain []dyspnea []wheezing    [facility]S : []chest pain []SOB on exertion []orthopnea []PND []leg swelling   GI ROS: []n/v []abd pain []diarrhea []constipation []blood/black stool   Uro ROS: []dysuria []frequency []flank pain [] trouble voiding []hematuria   MSK ROS: []myalgias []joint pain []neck pain []back pain [] falls   Derm ROS: []pruritis []rash []jaundice        Past Medical History:   Diagnosis Date    Abnormal Pap smear of cervix     GERD (gastroesophageal reflux disease)     Vocal nodules in adults      Past Surgical History:   Procedure Laterality Date    WISDOM TOOTH EXTRACTION       Social History  Social History     Tobacco Use    Smoking status: Never Smoker    Smokeless tobacco: Never Used   Substance Use Topics    Alcohol use: Yes     Frequency: 2-4 times a month     Drinks per session: 5 or 6    Drug use: No     Family History   Problem Relation Age of Onset    Breast cancer Maternal Grandmother     Colon cancer Father 40    Diabetes Father     Hypertension Father     Coronary artery disease Father         bypass    Psoriasis Father         psoriatic arthritis     Breast cancer Maternal Aunt     Diabetes Paternal Aunt     Coronary artery disease Paternal Aunt     Heart attacks under age 50 Paternal Aunt     Eclampsia Neg Hx     Stroke Neg Hx     Miscarriages / Stillbirths Neg Hx     Ovarian cancer Neg Hx      labor Neg Hx      Review of patient's allergies indicates:  No Known Allergies    Physical Examination  /64 (BP Location: Right arm, Patient Position: Sitting, BP Method: Medium (Manual))   Pulse 70   Temp 97.9 °F (36.6 °C) (Temporal)   Resp 16   Ht 5' 4" (1.626 m)   Wt 69.5 kg (153 lb 3.5 oz)   SpO2 98%   BMI 26.30 kg/m²   Wt Readings from Last 3 Encounters:   20 69.5 kg (153 lb 3.5 oz)   09/10/19 67.6 kg (149 lb)   08/15/19 64.4 kg (141 lb 15.6 oz)     BP Readings from Last 3 " "Encounters:   11/20/20 112/64   03/04/20 124/74   09/10/19 112/72     Estimated body mass index is 26.3 kg/m² as calculated from the following:    Height as of this encounter: 5' 4" (1.626 m).    Weight as of this encounter: 69.5 kg (153 lb 3.5 oz).     General appearance: alert, cooperative, no distress  Eyes: pupils equal and reactive, extraocular eye movements intact   Ears: bilateral TM's and external ear canals normal   Nose: normal and patent, no erythema, discharge or polyps   Sinuses: Normal paranasal sinuses without tenderness   Throat: mucous membranes moist, pharynx normal without lesions   Neck: no thyromegaly, trachea midline  Lungs: clear to auscultation, no wheezes, rales or rhonchi, symmetric air entry, no dullness to percussion bilaterally.  Heart: normal rate, regular rhythm, normal S1, S2, no murmurs, rubs, clicks or gallops, no displacement of the PMI.  Abdomen: soft, nontender, nondistended, no masses or organomegaly No rigidity, rebound, or guarding.   Back: full range of motion, no tenderness, palpable spasm or pain on motion   Extremities: peripheral pulses normal, no pedal edema, no clubbing or cyanosis   Feet: warm, good capillary refill.  Neurological:alert, oriented, normal speech, no focal findings or movement disorder noted   Psychiatric: alert, oriented to person, place, and time  Integument: normal coloration and turgor, no rashes, no suspicious skin lesions noted.    Data reviewed    Previous medical records reviewed and summarized above in HPI.     Laboratory    I have reviewed old labs below:    Lab Results   Component Value Date    WBC 6.50 08/15/2019    HGB 13.1 08/15/2019    HCT 39.7 08/15/2019     (H) 08/15/2019     08/15/2019     08/15/2019    K 4.1 08/15/2019     08/15/2019    CALCIUM 9.5 08/15/2019    CO2 24 08/15/2019    GLU 95 08/15/2019    BUN 13 08/15/2019    CREATININE 0.9 08/15/2019    ANIONGAP 8 08/15/2019    ESTGFRAFRICA >60.0 08/15/2019    " EGFRNONAA >60.0 08/15/2019    PROT 6.6 08/15/2019    ALBUMIN 4.1 08/15/2019    BILITOT 0.7 08/15/2019    ALKPHOS 56 08/15/2019    ALT 10 08/15/2019    AST 15 08/15/2019    CHOL 170 08/15/2019    TRIG 82 08/15/2019    HDL 56 08/15/2019    LDLCALC 97.6 08/15/2019    HGBA1C 4.8 08/15/2019     Lab reviewed by me: Particular labs of significance that I will monitor, workup, or treat to improve are marked below.     elev mcv       Assessment/Plan    Shira Ashton is a 30 y.o. female who presents to clinic with:    1. Routine health maintenance    2. Macrocytic anemia    3. Gastroesophageal reflux disease, unspecified whether esophagitis present    4. Vitamin D deficiency    5. Family history of colon cancer in father         Diagnostic impression remarks      Macrocytic anemia-elevated MCV she has good healthy diet and will check B12 folate and methylmalonic acid since MMA is most sensitive marker.  Also get TSH as well.   Could will and controlled with diet modifications only recommended continue refer cautions and will monitor for if need of medication   Vitamin-D deficiency-patient had vitamin-D deficiency in the past reports that she does have sun exposure but she uses sunscreen thus will check levels and recommend supplement if low for good bone health   Preventive-patient's father had colon cancer diagnosed at 41 thus she will need colonoscopy at 32 and recommended this strongly.  She denied blood black in her stool and she has no weight loss fevers or night sweats.  Will check yearly blood counts as well.  She is up-to-date on Pap smear and is following this.  She would not want the flu vaccination this year.    BMI Goal    Counseled patient on her ideal body weight, health consequences of being obese and current recommendations including weekly exercise and a heart healthy diet.  She is aware that ideal BMI < 25  Estimated body mass index is 26.3 kg/m² as calculated from the following:    Height as of this  "encounter: 5' 4" (1.626 m).    Weight as of this encounter: 69.5 kg (153 lb 3.5 oz).     She was counseled about the importance of healthy dietary habits as well as routine physical activity and exercise for better health outcomes. I also discussed the importance of cancer screening.     Medication Monitoring    In today's visit, monitoring for drug toxicity was accomplished. Proper use of medications was also discussed.     Counseling    Eat Less Unhealthy Fat   -Cut back on saturated fats and trans (also called hydrogenated) fats. A diet thats high in these fats increases your bad cholesterol. Its not enough to just cut back on foods containing cholesterol.   -Eat about 2 servings of fish per week. Most fish contain omega-3 fatty acids. These help lower blood cholesterol.   -Eat more whole grains and soluble fiber (such as oat bran). These lower overall cholesterol.   -Counseled that most cholesterol is made in the liver and only a minority comes from diet.    Be Active   -Choose an activity you enjoy. Walking, swimming, and riding a bike are some good ways to be active.   -Start at a level where you feel comfortable. Increase your time and pace a little each week.   -Work up to 30 minutes on most days. You can break this up into three 10-minute periods.   -Remember, some activity is better than none.   -If you havent been exercising regularly, start slowly. Check with your doctor to make sure the exercise plan is right for you.     Take Medication As Directed: Many patients need medication to get their LDL levels to a safe level. Medication to lower cholesterol levels is effective and safe. (But taking medication is not a substitute for exercise or watching your diet!).      I discussed imaging findings, diagnosis, possibilities, treatment options, medications, risks, and benefits. She had many questions regarding the options and long-term effects. All questions were answered. She expressed understanding after " counseling regarding the diagnosis and recommendations. She was capable and demonstrated competence with understanding of these options. Shared decision making was performed resulting in her choosing the current treatment plan.     I also discussed the importance of close follow up to discuss labs, change or modify her medications if needed, monitor side effects, and further evaluation of medical problems.     Additional workup planned: see labs ordered below.    See below for labs and meds ordered with associated diagnosis    1. Routine health maintenance  - Comprehensive Metabolic Panel; Future  - CBC Auto Differential; Future  - Lipid Panel; Future  - Vitamin B12; Future  - Ferritin; Future    2. Macrocytic anemia  - TSH; Future  - Vitamin B12; Future  - Ferritin; Future  - Folate; Future  - Methylmalonic Acid, Serum; Future    3. Gastroesophageal reflux disease, unspecified whether esophagitis present    4. Vitamin D deficiency  - Vitamin D; Future    5. Family history of colon cancer in father       Medication List with Changes/Refills   Current Medications    LEVONORGESTREL (MIRENA) 20 MCG/24 HOURS (5 YRS) 52 MG IUD    1 Intra Uterine Device by Intrauterine route once. for 1 dose     Modified Medications    No medications on file       No follow-ups on file. for further workup and reassessment if labs and tests obtained are stable or sooner as needed. She was instructed to call the clinic or go to the emergency department if her symptoms do not improve, worsens, or if new symptoms develop. As we discussed that symptoms could worsen over the next 24 hours she was advised that if any increased swelling, pain, or numbness arise to go immediately to the ED. Patient knows to call any time if an emergency arises. Shared decision making occurred and she verbalized understanding in agreement with this plan.     Documentation entered by me for this encounter may have been done in part using speech-recognition  "technology. Although I have made an effort to ensure accuracy, "sound like" errors may exist and should be interpreted in context.     Ronn De Oliveira MD  11/20/2020     "

## 2020-11-20 NOTE — PATIENT INSTRUCTIONS
4 Steps for Eating Healthier  Changing the way you eat can improve your health. It can lower your cholesterol and blood pressure, and help you stay at a healthy weight. Your diet doesnt have to be bland and boring to be healthy. Just watch your calories and follow these steps:    1. Eat fewer unhealthy fats  · Choose more fish and lean meats instead of fatty cuts of meat.  · Skip butter and lard, and use less margarine.  · Pass on foods that have palm, coconut, or hydrogenated oils.  · Eat fewer high-fat dairy foods like cheese, ice cream, and whole milk.  · Get a heart-healthy cookbook and try some low-fat recipes.  2. Go light on salt  · Keep the saltshaker off the table.  · Limit high-salt ingredients, such as soy sauce, bouillon, and garlic salt.  · Instead of adding salt when cooking, season your food with herbs and flavorings. Try lemon, garlic, and onion.  · Limit convenience foods, such as boxed or canned foods and restaurant food.  · Read food labels and choose lower-sodium options.  3. Limit sugar  · Pause before you add sugars to pancakes, cereal, coffee, or tea. This includes white and brown table sugar, syrup, honey, and molasses. Cut your usual amount by half.  · Use non-sugar sweeteners. Stevia, aspartame, and sucralose can satisfy a sweet tooth without adding calories.  · Swap out sugar-filled soda and other drinks. Buy sugar-free or low-calorie beverages. Remember water is always the best choice.  · Read labels and choose foods with less added sugar. Keep in mind that dairy foods and foods with fruit will have some natural sugar.  · Cut the sugar in recipes by 1/3 to 1/2. Boost the flavor with extracts like almond, vanilla, or orange. Or add spices such as cinnamon or nutmeg.  4. Eat more fiber  · Eat fresh fruits and vegetables every day.  · Boost your diet with whole grains. Go for oats, whole-grain rice, and bran.  · Add beans and lentils to your meals.  · Drink more water to match your fiber  increase. This is to help prevent constipation.  Date Last Reviewed: 5/11/2015  © 8844-6267 [employer]. [address] [place] [address], [place] [address]. All rights reserved. This information is not intended as a substitute for professional medical care. Always follow your healthcare professional's instructions.

## 2020-11-22 ENCOUNTER — PATIENT MESSAGE (OUTPATIENT)
Dept: FAMILY MEDICINE | Facility: CLINIC | Age: 30
End: 2020-11-22

## 2020-11-22 DIAGNOSIS — E55.9 VITAMIN D DEFICIENCY: Primary | ICD-10-CM

## 2020-11-22 RX ORDER — ERGOCALCIFEROL 1.25 MG/1
50000 CAPSULE ORAL
Qty: 8 CAPSULE | Refills: 0 | Status: SHIPPED | OUTPATIENT
Start: 2020-11-22 | End: 2021-01-17

## 2020-11-23 NOTE — PROGRESS NOTES
Results are mildly abnormal but no change in therapy is needed at this time except monitoring with follow up. Patient notified of results and to continue current treatment plan we discussed in clinic.     Counseling included discussion regarding imaging findings, diagnosis, possibilities, treatment options, medications, risks, and benefits. All questions were answered.      I discussed the importance of close follow up to discuss labs, change or modify medications if needed, monitor side effects, and further evaluation of medical problems.     Ronn De Oliveira MD  11/22/2020

## 2020-11-23 NOTE — PROGRESS NOTES
Results show low vitamin D and recommended replacement therapy and yearly follow up or sooner if needed. Elevated MCV likely not from B12 or Folate deficiency will monitor.     Ronn De Oliveira MD  11/22/2020

## 2020-11-28 LAB — METHYLMALONATE SERPL-SCNC: 0.21 UMOL/L

## 2020-12-12 ENCOUNTER — PATIENT MESSAGE (OUTPATIENT)
Dept: FAMILY MEDICINE | Facility: CLINIC | Age: 30
End: 2020-12-12

## 2021-09-07 ENCOUNTER — PATIENT MESSAGE (OUTPATIENT)
Dept: FAMILY MEDICINE | Facility: CLINIC | Age: 31
End: 2021-09-07

## 2022-02-14 ENCOUNTER — HOSPITAL ENCOUNTER (OUTPATIENT)
Dept: RADIOLOGY | Facility: CLINIC | Age: 32
Discharge: HOME | End: 2022-02-14
Attending: STUDENT IN AN ORGANIZED HEALTH CARE EDUCATION/TRAINING PROGRAM
Payer: COMMERCIAL

## 2022-02-14 ENCOUNTER — OFFICE VISIT (OUTPATIENT)
Dept: FAMILY MEDICINE | Facility: CLINIC | Age: 32
End: 2022-02-14
Payer: COMMERCIAL

## 2022-02-14 VITALS
DIASTOLIC BLOOD PRESSURE: 62 MMHG | WEIGHT: 156.5 LBS | HEART RATE: 72 BPM | BODY MASS INDEX: 26.72 KG/M2 | SYSTOLIC BLOOD PRESSURE: 112 MMHG | OXYGEN SATURATION: 98 % | TEMPERATURE: 99 F | HEIGHT: 64 IN | RESPIRATION RATE: 16 BRPM

## 2022-02-14 DIAGNOSIS — R79.9 ABNORMAL FINDING OF BLOOD CHEMISTRY, UNSPECIFIED: ICD-10-CM

## 2022-02-14 DIAGNOSIS — Z11.59 ENCOUNTER FOR HCV SCREENING TEST FOR LOW RISK PATIENT: ICD-10-CM

## 2022-02-14 DIAGNOSIS — K21.9 GASTROESOPHAGEAL REFLUX DISEASE, UNSPECIFIED WHETHER ESOPHAGITIS PRESENT: ICD-10-CM

## 2022-02-14 DIAGNOSIS — R61 NIGHT SWEATS: ICD-10-CM

## 2022-02-14 DIAGNOSIS — Z80.0 FAMILY HISTORY OF COLON CANCER IN FATHER: ICD-10-CM

## 2022-02-14 DIAGNOSIS — R22.31 NODULE OF FINGER, RIGHT: ICD-10-CM

## 2022-02-14 DIAGNOSIS — Z12.11 COLON CANCER SCREENING: ICD-10-CM

## 2022-02-14 DIAGNOSIS — Z00.00 ENCOUNTER FOR PREVENTIVE HEALTH EXAMINATION: Primary | ICD-10-CM

## 2022-02-14 PROCEDURE — 3074F SYST BP LT 130 MM HG: CPT | Mod: CPTII,S$GLB,, | Performed by: STUDENT IN AN ORGANIZED HEALTH CARE EDUCATION/TRAINING PROGRAM

## 2022-02-14 PROCEDURE — 73140 X-RAY EXAM OF FINGER(S): CPT | Mod: 26,RT,S$GLB, | Performed by: RADIOLOGY

## 2022-02-14 PROCEDURE — 3008F BODY MASS INDEX DOCD: CPT | Mod: CPTII,S$GLB,, | Performed by: STUDENT IN AN ORGANIZED HEALTH CARE EDUCATION/TRAINING PROGRAM

## 2022-02-14 PROCEDURE — 73140 XR FINGER 2 OR MORE VIEWS RIGHT: ICD-10-PCS | Mod: 26,RT,S$GLB, | Performed by: RADIOLOGY

## 2022-02-14 PROCEDURE — 3078F DIAST BP <80 MM HG: CPT | Mod: CPTII,S$GLB,, | Performed by: STUDENT IN AN ORGANIZED HEALTH CARE EDUCATION/TRAINING PROGRAM

## 2022-02-14 PROCEDURE — 3078F PR MOST RECENT DIASTOLIC BLOOD PRESSURE < 80 MM HG: ICD-10-PCS | Mod: CPTII,S$GLB,, | Performed by: STUDENT IN AN ORGANIZED HEALTH CARE EDUCATION/TRAINING PROGRAM

## 2022-02-14 PROCEDURE — 99395 PR PREVENTIVE VISIT,EST,18-39: ICD-10-PCS | Mod: S$GLB,,, | Performed by: STUDENT IN AN ORGANIZED HEALTH CARE EDUCATION/TRAINING PROGRAM

## 2022-02-14 PROCEDURE — 1159F MED LIST DOCD IN RCRD: CPT | Mod: CPTII,S$GLB,, | Performed by: STUDENT IN AN ORGANIZED HEALTH CARE EDUCATION/TRAINING PROGRAM

## 2022-02-14 PROCEDURE — 3008F PR BODY MASS INDEX (BMI) DOCUMENTED: ICD-10-PCS | Mod: CPTII,S$GLB,, | Performed by: STUDENT IN AN ORGANIZED HEALTH CARE EDUCATION/TRAINING PROGRAM

## 2022-02-14 PROCEDURE — 99999 PR PBB SHADOW E&M-EST. PATIENT-LVL V: ICD-10-PCS | Mod: PBBFAC,,, | Performed by: STUDENT IN AN ORGANIZED HEALTH CARE EDUCATION/TRAINING PROGRAM

## 2022-02-14 PROCEDURE — 1159F PR MEDICATION LIST DOCUMENTED IN MEDICAL RECORD: ICD-10-PCS | Mod: CPTII,S$GLB,, | Performed by: STUDENT IN AN ORGANIZED HEALTH CARE EDUCATION/TRAINING PROGRAM

## 2022-02-14 PROCEDURE — 73140 X-RAY EXAM OF FINGER(S): CPT | Mod: TC,FY,PO,RT

## 2022-02-14 PROCEDURE — 99395 PREV VISIT EST AGE 18-39: CPT | Mod: S$GLB,,, | Performed by: STUDENT IN AN ORGANIZED HEALTH CARE EDUCATION/TRAINING PROGRAM

## 2022-02-14 PROCEDURE — 99999 PR PBB SHADOW E&M-EST. PATIENT-LVL V: CPT | Mod: PBBFAC,,, | Performed by: STUDENT IN AN ORGANIZED HEALTH CARE EDUCATION/TRAINING PROGRAM

## 2022-02-14 PROCEDURE — 3074F PR MOST RECENT SYSTOLIC BLOOD PRESSURE < 130 MM HG: ICD-10-PCS | Mod: CPTII,S$GLB,, | Performed by: STUDENT IN AN ORGANIZED HEALTH CARE EDUCATION/TRAINING PROGRAM

## 2022-02-14 NOTE — PATIENT INSTRUCTIONS
"Please read below to learn more on healthy choices, screenings, and useful information related to your health.      "Learning is the only thing the mind never exhausts, never fears, and never regrets." Tramaine Aleman          Table of Contents:     1. Overdue health maintenance   2. Cancer prevention  3. Explanation on the different components of your blood work and interpretation  4. Frequently asked questions         Hi Shira, if you are due for any health screening(s) below please notify me so we can arrange them to be ordered and scheduled to maintain your health.   Health Maintenance Due   Topic    Hepatitis C Screening     HIV Screening     Influenza Vaccine (1)    COVID-19 Vaccine (3 - Booster)                                Cancer Prevention    Why should you choose to get screened for cancer? One simple reason is because you are important. You matter and deserve to have the best health so you can fulfill your great potential.       Colon Cancer screening - Most colon cancers can be prevented by screening. In most cases a polyp in the colon can grow and is not cancerous at first, but it can become cancerous years later. A polyp is like a seed that can grow into a weed if it is left in the soil. If the seed is detected and removed, no weed sprouts. A FIT test/Cologuard test and colonoscopy can detect precancerous polyps and lead to the prevention of cancer. A polyp is just like a seed that can be removed before the roots take hold. A colonoscopy can remove these polyps and eliminate the chance that these polyps turn into cancer.     Cervical Cancer screening- A PAP smear can detect precancerous cells that can become cervical cancer and can lead to procedures to remove them. It is very important to get a PAP smear as investing these few minutes can help prevent a lot of trouble down the road. If you want to do the most you can do to spend more time with your family and friends', cancer screenings can " "help with that goal.     Breast Cancer screening- Mammograms can detect breast cancer before it has spread and early detection allows the ability to remove the lesion prior to any spread. It is similar to a small rotten spot on fruit. If the spoiled part is removed quickly it can preserve the rest of the fruit, but if it is left alone it will corrupt the whole peach.     Smoking Cessation- "Smoking is like a chimney. The tar builds up and causes a back draft which leads to a cough. Smoking is like sitting in a car with a blocked exhaust system." Smoking can increase your risk for lung, mouth, throat, nose, esophagus, bladder, kidney, ureter, pancreas, stomach, liver, cervix, ovary, bowel, and leukemia [2]. If you have smoked in the past, you might meet the criteria for a CT lung cancer screening.     Lung Cancer Screening - "The U.S. Preventive Services Task Force (USPSTF) recommendsexternal icon yearly lung cancer screening with LDCT for people who--have a 20 pack-year or more smoking history, and smoke now or have quit within the past 15 years, and are between 50 and 80 years old. A pack-year is smoking an average of one pack of cigarettes per day for one year. For example, a person could have a 20 pack-year history by smoking one pack a day for 20 years or two packs a day for 10 years." [3]    Hypertension - Common high blood pressure meds may lower colorectal cancer risk-[place], July 6, 2020 - Hypertension Journal Report Medications commonly prescribed to treat high blood pressure may also reduce patients' colorectal cancer risk, according to new research published today in Hypertension, an American Heart Association journal." [4].     Low HbA1c - "Higher HbA1c levels (within both the non?diabetic and diabetic ranges) were associated with the risk of colorectal cancer (Model 2; P linear?=?0.009), especially for colon cancer." [5].    A healthy diet, exercise, limitation of alcohol use, certain infections, " avoidance of radiation/environmental toxins, and staying lean lowers your cancer risk [6].     I want to empower you to make informed choices for your health. I have a listed below the most common blood components that we check for when you get blood work. I discuss what each component measures along with common reasons for why they can be abnormal. If you are interested in understanding your blood work better, please read the lab explanation attached below.     Explanation of Lab Results    Please note: This information is included as a reference to help you better understand your lab results and is not be used for diagnosis.     Lipid Panel:  Cholesterol is a measure of cardiac risk and stroke risk. A lipid panel measures total cholesterol and provides readings which are broken down into 3 subsections: Triglycerides, HDL and LDL.    Total Cholesterol: Your total blood cholesterol is a measure of LDL cholesterol, HDL cholesterol, and other lipid components.  If your individual lipid level components are in the normal range and you have an elevated total cholesterol level we are generally not to concerned since we primarily look at the LDL cholesterol which is considered the bad cholesterol which can lead to heart attacks and strokes.  Your calculated cardiac risk is the most important factor in deciding if you would benefit from a cholesterol-lowering medication that the total cholesterol level.    Triglycerides are most diet and weight sensitive. They are affected easily by lifestyle changes. Ideally, this reading should be less than 150, although if the remainder of the cholesterol panel is within normal limits, we will tolerate upwards of 250-300. For the most part, if triglycerides are the only part of your cholesterol panel reading as 'abnormal', it is best to lose weight and modify your diet, including less saturated fat and less simple sugars. We only start medication to lower this is the level is greater  than 500 since this can increase ones risk for pancreatitis. This is not the cholesterol type that leads to heart attacks.     HDL is your 'good cholesterol.' Ideally, the reading should be over 40 and is particularly helpful when it is greater than 60. Research indicates that high HDL is extremely protective; and if your HDL level is greater than 60, we tolerate far worse LDL or triglyceride levels. For the most part, HDL is responsive to aerobic activity and ideal weight. We do not have medicines that increase the HDL reading very easily.    LDL is your 'bad cholesterol.' Our goals depend on how many cardiac risk factors you have.     High LDL: If you are diabetic or have had a heart attack, we like the LDL level to be less than 100. If you have 2 cardiac risk factors (such as diabetes, hypertension, family history, a low HDL and smoking), we like your LDL to be less than 130. If you have 0-2 cardiac risk factors, we like your LDL level to be less than 160, but we may not necessarily initiate medications to 190 unless you cannot lower it. The latest guidelines recommend to consider taking a statin only if your ASCVD cardiac risk score is at least greater than 7.5% and a strong recommendation if your risk score is greater than 10%.     Low LDL: Normal or low LDL is considered good and having an LDL below the normal range is not of a concern.     Complete Blood Count (CBC):    White blood cells: These are infection fighting cells. Mild fluctuations may represent minor illness at the time of blood draw. Marked fluctuations can represent immune diseases. This can also be elevated from smoking.      High WBC: Elevation in wbc can commonly be caused by an infection, steroid use, or any cause of inflammation such as many rheumatologic diseases, surgery, or trauma.       Low WBC: Many different things can cause this such as infections, medications, rheumatologic disorders, malignancies, nutritional deficiencies, and a  normal variant in some individuals. If this occurs it is common practice to rule out a few viruses such as HIV, Hep C, B12, folate along with a a peripheral blood smear to look for abnormalities. If you are otherwise healthy with no concerning symptoms and the workup is negative we usually monitor it with yearly blood work.  If there are other abnormal cell line abnormalities sometimes we refer to hematology to further evaluate.    Hemoglobin: A key indicator for anemia. Ranges depend on age. If you are significantly out of this range, we may need to talk with you.     High Hemoglobin: This can be elevated in some blood disorders, sleep apnea, chronic lung disease, kidney disease, testosterone use, dehydration, smoking, along with some other rare causes.      Low Hemoglobin: Many things can lead to this but the most common cause is an iron deficiency in females who lose blood during their periods, decreased absorption due to antacids, poor diet, sickle cell, anemia of chronic disease, malignancy, bleeding from the gut, along with some other less common causes. If you are a young female who has periods it is usually assumed that this is from that blood loss unless other symptoms or abnormal blood work is present. If you are above 45 and have an iron deficiency it is always recommended to get a colonoscopy to ensure that there is no lesion causing blood loss and to exclude malignancy.     Hematocrit: Another way of looking at anemia, much like your hemoglobin. If you are significantly out of this range, we may need to talk with you.    MCV: This looks at the size of your red blood cells.      High MCV:  A large number may indicate a B-12 deficiency, folate deficiency, alcohol use, liver disease, medication-induced phenomenon, or a need for further workup.     Low MCV: A small number may imply iron anemia or genetic disorder such as alpha-thalassemia. We may check iron studies called to see if you are low.    MCH:  Much like MCV above.    Platelets: These are clotting factors. We tolerate a broad range in this. If your numbers are less than 100, we may need to work it up.      High Platelet Count: If your numbers are elevated, this could represent ongoing inflammation within the body which can be caused by any infection, illness, iron deficiency, or other malignancy. We usually recheck it to monitor for improvement and if it does not resolve will work it up with more blood work.      Low Platelet Count: Many things can cause this such as infections, alcohol use, medications, liver disease, vitamin deficiencies, aspleenia, and some other rare blood disorders. If it persists many times we refer to hematology if a cause is not identified.     Iron:  This is not a reliable blood marker since this fluctuates throughout the day and if you are fasting many times your iron level in your blood is low but this does not necessarily mean you have a deficiency.  There are more reliable ways to measure your iron stores and these are discussed below.    Transferrin:  Many things can cause an abnormal result and this is not as important as some other labs mentioned below.    TIBC:  This is the total iron-binding capacity and this measures the total amount of iron that can be bound by proteins in the blood.  If you have an elevated TIBC this is a good marker that you could be low on iron and this is useful blood marker.  A simple way to think of this is seats in a car. The TIBC is the number of open seats that iron can sit in. If you have a high TIBC (number of open seats) that means you have a low iron level.     Ferritin:  A low ferritin is almost always caused from an iron deficiency.  A normal ferritin level does not need necessarily exclude an iron deficiency since many inflammatory conditions such as kidney disease, diabetes, arthritis, and obesity can raise this level hiding an iron deficiency.  If you are healthy with no inflammatory  conditions this is a very reliable test for detecting an iron deficiency since nothing else lowers your ferritin level except a low iron level. Keep in mind that you can have an iron deficiency if your ferritin level is normal but your TIBC is elevated.  If you have a low iron level the next step is always to identify why you have a low iron level.    CMP (Comprehensive Metabolic Panel):  Broadly, this is a test of organ function including the kidneys, liver, and electrolyte levels.    Glucose: This is primarily looking for diabetes. We like your blood glucose level to be less than 100 when fasting. Readings of 100-125 indicate what we call 'pre-diabetes' or 'glucose intolerance.' This does not necessarily indicate diabetes, but we may check another test called a hemoglobin A1C for confirmation. This level puts you at great risk for becoming a true diabetic and we would encourage the reduction of simple sugars and processed white flour as well as appropriate weight loss. If this number is greater than 125, it is likely you are diabetic; we will get an additional hemoglobin A1C test and will likely schedule you for an appointment. If you notice that your blood glucose is above 125 and we have not scheduled a follow-up appointment, please call us. Patients who are known diabetics can have readings greater than 125.    Urea Nitrogen: This is a kidney function and maybe elevated because of mild dehydration or because of excessive muscle breakdown from aggressive exercise habits.    Creatinine: This also is a kidney function test. It may be mildly elevated if you have particularly large muscle bulk or taking a supplement like creatine. It is related to the GFR. It is a muscle product that we track to look at your kidney function. If this is elevated and new, we may need to talk with you. If you have had this mildly elevated in the past, it is likely that we will just track it to ensure that it does not worsen quickly.  Some medications may gently worsen this, namely blood pressure pills called ace inhibitors. To some degree, we will permit levels of up to 1.6.    Sodium: This is essentially the concentration of salt within the body. This may be mildly low because of dehydration, overhydration, diuretics, .    Potassium: This is an electrolyte that can cause muscular cramping or cardiac difficulties. It is sometimes lowered by diuretic medications.    Chloride: For the most part, this is only relevant if the other electrolytes are abnormal.    CO2: This is a function of acid balance within the body. For the most part, mild abnormalities are not important and may represent a starvation or dehydration state when blood was drawn. Many medications can change this level as well such as diuretics, acetazolamide, calcium carbonate, laxatives, aspirin, and many others.     High CO2: Many things can cause this which includes dehydration, sleep apnea, and COPD.      Low CO2: Many things can lead to a low level and if you are generally healthy we are usually  not concerned. Diarrhea, renal disease, diabetes, and many medications can cause this.     Anion Gap: Only relevant if your CO2 is abnormal.    Calcium: This is not related to dietary intake of calcium. It may fluctuate gently based on the amount of protein within your body. If it is above 10.9, we may need to do additional testing. Many times if low the albumin level is low and once the corrected calcium level is calculated the calcium is in a normal range.     Total protein: This looks at protein within the body. Markedly elevated levels can represent an immune response and may require further workup.    Albumin: This may be elevated because of particularly high level of fitness. If it is markedly suppressed, it may represent organ dysfunction, particularly in the liver or kidneys.    AST and ALT: These are enzymes in the liver and if elevated can indicate liver damage.       Elevated AST/ALT: Many things can caused elevated liver enzymes and the most common reason is viral infections, alcohol use, medications, supplements, autoimmune, along with some other rare causes. One of the most common reasons for a mild elevation in liver enzymes (less than 3 times the upper limit) is fatty liver disease. Many individuals who have extra fat in their diet store this in the liver and this can build up and cause a mild elevation. This is usually diagnosed with a liver ultrasound and exclusion of other causes. The only treatment for this is diet and exercise along with avoidance of liver toxic medications and alcohol. It is standard of care to rule our viral hepatitis and get imaging of the liver if elevated. This is monitored and if I feel concerned will refer to hepatology.     Alk Phos: Part of liver function or bones     High Alk Phos: elevated levels may indicate a liver injury or obstruction of bile flow. Elevated levels can also be seen in vitamin D deficiency, drug induced, or bone disorders.      Low Alk Phos: In most cases having a low Alkaline Phosphatase enzyme activity is not due to any disease and is simply a normal variant.  Having an elevated Alk Phos is more concerning and associated with many diseases and thus I would not be overly concerned with a low level which is seen in many health individuals. The reasons for a low serum alkaline phosphatase activity were reviewed in a 1-yr retrospective study and in this study it was found that no cause was found in most cases.  Low activity values were recorded in several individuals in the absence of any obvious cause. This would suggest that the definition of the lower limit of the reference range for alkaline phosphatase is arbitrary, thus limiting the use of low serum activity as a marker of disease.  In some cases micronutrients like Zinc (Zn) and Magnesium (Mg) are causes of low ALP activity and you can take zinc or magnesium  supplements. Unfortunately, the blood work to measure zinc and magnesium levels are unreliable and not very accurate since it does not test for the intracellular zinc or magnesium levels.     Beni ARCHULETA, Risa ZAVALA, Vanessa BOWLES. Clinical significance of a low serum alkaline phosphatase. [facility] J Med. 1992 Feb;40(1-2):9-14. PMID: [id number].  Ricardo SAEED S, Romel B, Vicky I, Behera S, Ricardo S. Low Alkaline Phosphatase (ALP) In Adult Population an Indicator of Zinc (Zn) and Magnesium (Mg) Deficiency. Curr Res Nutr Food Sci 2017;5(3). doi : http://dx.[email or web address]    Total Bilirubin: This is also part of liver function.     High T Bili: If you have right upper quadrant pain an elevation in total bilirubin can be caused by a gallbladder stone that is blocking the biliary tract that leads to your gastrointestinal tract. If you have fever and right upper quadrant pain this can sometimes be elevated when your gallbladder is infected and most individuals have nausea with vomiting associated with it. If you have no symptoms and are otherwise healthy this can be caused by Gilbert syndrome which is a benign normal variant. There are other causes such as some anemias but there would be abnormal blood counts.      Low T Bili: Nothing to be concerned about.     Thyroid Stimulating Hormone (TSH): This reading is an indicator of your thyroid function. The thyroid regulates energy levels throughout the entire body, affecting almost every organ system. This is an inverse relationship so a high number actually presents a low thyroid. If this is abnormal, we will often check an additional lab called a Free T4 to evaluate this more carefully. Borderline elevations, those of 5-10, can be watched or worked up further. Please do not take the supplement Biotin for at least a week prior to getting your TSH checked since this can lead to false measurement levels.     Prostate Specific Antigen (PSA): (Men only.) This is a prostate  "cancer screening test, and is no longer a routine screening test. Levels are truly a function of age. Being less than 4 is typical for someone more in their 60s. If you are young, it should probably be less than 3. A higher PSA result does not necessarily mean that you have cancer, but may indicate a need for a discussion with your provider. Options include observation to look at rate of rise or prostate biopsy. Not only is the absolute value important, but how much it has changed from previous years. Please ensure that there is not a dramatic rise from previous years.    Please Note: This information is included as a reference to help you better understand your lab results and is not be used for diagnosis.    Frequently asked questions    When should I take my blood pressure medication?    The latest studies show that taking your blood pressure medication at night is the best time. A recent study showed that this prevents more heart attacks and strokes. See the answer below from [place].     "Q. I've taken blood pressure medicines every morning for many years, and they keep my pressure under control. Recently, my doctor recommended taking them at bedtime, instead. Does that make sense?    A. It actually does make sense -- based on recent research. For many years, there have been at least three theoretical reasons for taking blood pressure medicines before bedtime. First, a body system that strongly affects blood pressure, called the renin-angiotensin system, has its peak activity during sleep. Second, circadian rhythms cause differences in the body chemistry at night compared with daytime. Third, most heart attacks occur in the morning, before medicines taken in the morning have a chance to "kick in."" [6].     When should I take my cholesterol medication?    It used to be recommended to take your cholesterol medication at night since the original statins that lowered cholesterol did not last 24 hours and most " "cholesterol synthesis is done at night. The long acting statins such as atorvastatin and rosuvastatin last 24 hours so they can be taken any time during the day. Simvastatin, pravastatin, fluvastatin, and lovastatin are shorter acting and should be taken at bed time.     Can supplements affect my blood work?    Yes they can. A very important supplement to not take for at least a week prior to your blood work is biotin. "Biotin supplement use is common and can lead to the false measurement of thyroid hormone in commonly used assays." [8.]    What are conditions that should not be addressed during a virtual visit?    There are some conditions that should not be evaluated via a virtual visit since optimal care is impossible. Chest pain, shortness of breath, lung conditions, abdominal pain, and any neurological complaint such as headache, dizziness, numbness ect.         When will I get commentary of my blood work?    I review all blood work that you get and I will send out commentary on this via [employer] within 72 hours. In most cases you will get a message from me sooner, but many times not all of the blood work is completed thus I usually wait until all results have returned. If there is a critical abnormality you should be contacted the same day you got blood work.     How frequently do I need to have visits to get controlled substances?    It is standard protocol to have a visit every 3 months if you are taking scheduled substances such as ADHD medications, psychiatric medications, and pain medications. This is to ensure your safety and monitor for any side effects.     When should I bring prior to a visit if I want to lose weight?    I recommend that you make a food diary for a week and fill out what you ate each day. You can bring this form in to your visit and I can look over it to suggest changes that you can make.     Which over the counter medications should I avoid if I have decreased kidney " function?    NSAIDs which includes ibuprofen (Advil, Motrin, Nuprin), naproxen (Aleve), meloxicam (Mobic) and diclofenac(Zorvolex, Voltaren) and ketorolac (Toradol) can damage your kidneys if you take this long term.Tylenol does not affect your kidney and thus is safe as long as you don't have liver disease.     Is there an [facility] pharmacy?     Yes! The [facility] is located on the first floor of the [facility]. The address is listed below. You can get curbside pickup if you call their number at [phone]. One of the many benefits of using the [facility] is that the pharmacists can contact me directly if a cheaper alternative is available to save you money. They also see your note to know more about what the medication was prescribed for. I recommend this pharmacy since communication with me is quick in case any confusion arises on your medications.     [address] [place] [address].  [address] [place] [id number]  Phone: [phone]    Hours:  Monday - Friday  8:00 a.m. - 5:30 a.m.    Why is it not in my best interest to call in order to get an antibiotic?    Medicine is a complex field and many times the correct diagnosis is critical in order to provide the correct care. One of the most important goals of a healthcare provider is to ensure that no dangerous condition is masquerading as a mild illness. Specific questions are very important to obtain during an examination that provide a wealth of information to understand your illness. Health care providers are trained to investigate for signs that can be dangerous to your health. Messaging or calling the office in order to get an antibiotic can be very dangerous.     For example, many urinary tract infections can lead to an infection in the kidney that can result in a serious blood stream infection that can lead to hospitalization if not recognized. A cough can be caused by many different things and not necessarily an infection. It is not uncommon that one  assumes a cough is from an infection when it is actually caused by a blood clot in the lungs. This can lead to death. Determining your risk can only be performed after a thorough history and examination. A few sentences through e-mail is not enough.     What are some common symptoms that should be evaluated by the emergency department and not by phone or e-mail?    This does not include every symptom, but common examples of symptoms that should prompt one to go to the emergency department are chest pain, chest pressure, shortness of breath, difficulty breathing, abdominal pain, weakness or numbness or an extremity, sudden weakness or drooping on one side of the body, speaking difficulty, unusual or bad headache (particularly if it started suddenly), head injury, confusion, seizure, passing out, lightheaded, pain in arm or jaw, suddenly not able to speak, see, walk, or move, dizziness, neck stiffness, suicidal thoughts, testicular pain, cuts and wounds, severe pain, along with many others. This is not an inclusive list.     Outside Records    It is common to have an colonoscopy, mammogram, PAP smear, or eye exam done outside the [facility] system. Many times we do not get the records automatically sent to us. Please call your provider's office to notify them to fax us your records so that we can have the most up to date information. Your provider will review your outside results in order to provide you with the complete care that you deserve. We appreciate that you decided to choose us to be serving on your healthcare team and the more information we have about your health is essential.          Wishing you good health,     Ronn De Oliveira MD      References:  1-[email or web address]/[email or web address]  2-https://www.[employer].com.au/news/iygax-xwc-01-cancers-that-can-yk-gqpszj-hf-smoking/  3-https://www.cdc.gov/cancer/lung/basic_info/screening.htm  4-https://newsroom.heart.org/news/common-hypertension-medications-may-reduce-colorectal-cancer-risk  5-Bayrono A, Fabiano M, Enzoada N, et al. High hemoglobin A1c levels within the non-diabetic range are associated with the risk of all cancers. Int J Cancer. 2016;138(7):1038-2087. doi:10.1002/ijc.19855  6-https://www.health.[place].edu/newsletter_article/the-10-commandments-of-cancer-prevention  7-https://www.health.[place].edu/diseases-and-conditions/should-i-take-blood-pressure-medications-at-night  8-Amanda DOBSON et al 2018 Prevalence of biotin supplement usage in outpatients and plasma biotin concentrations in patients presenting to the emergency department. Clin Biochem. Epub 2018 Jul 20. PMID: [id number].

## 2022-02-14 NOTE — PROGRESS NOTES
"[facility] Note    Subjective:    Chief Complaint:   Chief Complaint   Patient presents with    Annual Exam    family history colon cancer       {[facility]    My Last Note    Chart Review        Opioid Risk Ass       Change CC    Future appts :[id number]::"   "}274}    31 y.o. female presents for multiple issues.     The HPI and pertinent ROS is included in the Diagnostic Impression Remarks section at the end of the note. Please see below for further details.     The following portions of the patient's history were reviewed and updated as appropriate: allergies, current medications, past family history, past medical history, past social history, past surgical history and problem list.    She  has a past medical history of Abnormal Pap smear of cervix (2010), GERD (gastroesophageal reflux disease), and Vocal nodules in adults.  She  has a past surgical history that includes [place] tooth extraction.    She  reports that she has never smoked. She has never used smokeless tobacco. She reports current alcohol use. She reports that she does not use drugs.  She family history includes Breast cancer in her maternal aunt and maternal grandmother; Cancer in her father; Colon cancer (age of onset: 40) in her father; Coronary artery disease in her father and paternal aunt; Diabetes in her father and paternal aunt; Heart attacks under age 50 in her paternal aunt; Hypertension in her father; Psoriasis in her father.    Review of patient's allergies indicates:  No Known Allergies     Physical Examination  /62 (BP Location: Right arm, Patient Position: Sitting, BP Method: Medium (Manual))   Pulse 72   Temp 98.7 °F (37.1 °C) (Oral)   Resp 16   Ht 5' 4" (1.626 m)   Wt 71 kg (156 lb 8.4 oz)   SpO2 98%   BMI 26.87 kg/m²    274}  Wt Readings from Last 3 Encounters:   02/14/22 71 kg (156 lb 8.4 oz)   11/20/20 69.5 kg (153 lb 3.5 oz)   09/10/19 67.6 kg (149 lb)     BP Readings from Last 3 " "Encounters:   02/14/22 112/62   11/20/20 112/64   03/04/20 124/74                Estimated body mass index is 26.87 kg/m² as calculated from the following:    Height as of this encounter: 5' 4" (1.626 m).    Weight as of this encounter: 71 kg (156 lb 8.4 oz).     General appearance: alert, cooperative, no distress  Neck: no thyromegaly, no neck stiffness  Lungs: clear to auscultation, no wheezes, rales or rhonchi, symmetric air entry  Heart: normal rate, regular rhythm, normal S1, S2, no murmurs, rubs, clicks or gallops  Abdomen: soft, nontender, nondistended, no rigidity, rebound, or guarding.   Back: no point tenderness over spine  Extremities: peripheral pulses normal, no unilateral leg swelling or calf tenderness   Neurological:alert, oriented, normal speech, no new focal findings or movement disorder noted from baseline    Data reviewed 274}  Previous medical records reviewed and summarized in HPI.     Laboratory  274}  I have reviewed old labs below:  Lab Results   Component Value Date    WBC 4.96 11/20/2020    HGB 14.4 11/20/2020    HCT 44.1 11/20/2020     (H) 11/20/2020     11/20/2020     11/20/2020    K 4.1 11/20/2020     11/20/2020    CALCIUM 9.8 11/20/2020    CO2 29 11/20/2020    GLU 91 11/20/2020    BUN 11 11/20/2020    CREATININE 0.9 11/20/2020    ANIONGAP 6 (L) 11/20/2020    ESTGFRAFRICA >60.0 11/20/2020    EGFRNONAA >60.0 11/20/2020    PROT 7.4 11/20/2020    ALBUMIN 4.6 11/20/2020    BILITOT 1.3 (H) 11/20/2020    ALKPHOS 59 11/20/2020    ALT 11 11/20/2020    AST 13 11/20/2020    CHOL 194 11/20/2020    TRIG 107 11/20/2020    HDL 53 11/20/2020    LDLCALC 119.6 11/20/2020    TSH 1.717 11/20/2020    HGBA1C 4.8 08/15/2019     Lab reviewed by me: Particular labs of significance that I will monitor, workup, or treat to improve are mentioned below in diagnostic impression remarks.  :[id number]}274}  Imaging/EKG: I have reviewed the pertinent results/findings and my personal findings are " "noted below in diagnostic impression remarks.  274}    Assessment/Plan  Shira Ashton is a 31 y.o. female who presents to clinic with:    1. Encounter for preventive health examination    2. Colon cancer screening    3. Nodule of finger, right    4. Family history of colon cancer in father    5. Gastroesophageal reflux disease, unspecified whether esophagitis present    6. Night sweats    7. Encounter for HCV screening test for low risk patient    8. Abnormal finding of blood chemistry, unspecified        Diagnostic Impression Remarks + HPI     Documentation entered by me for this encounter may have been done in part using speech-recognition technology. Although I have made an effort to ensure accuracy, "sound like" errors may exist and should be interpreted in context.      Preventive-patient reports having a family history of colon cancer diagnosed in her father and his early 40s will set up for a colonoscopy no blood or black in stool no unintentional weight loss or fevers.  No decreased appetite.  Patient does have some night sweats no other symptoms will get some blood work follow-up on this   Night sweats-patient reports having night sweats for months no fevers no cough no recent travel no exposure to any homeless or presents directly.  No contacts from other countries and no travel outside the country.  No unintentional weight loss.  Will check thyroid iron levels other blood work will rule out hep C and HIV.  Follow-up on blood work   Cyst on right index finger-stable no pain currently likely mucoid cyst will get chest x-ray will monitor   GERD well controlled on diet continue   Elevated MCV-unclear could be normal variant no symptoms and had a normal MMA and homocystine test thus unlikely B12 deficiency but will recheck with more sensitive B12 test    This is the extent of the patient's complaints at this present time. She denies chest pain upon exertion, dyspnea, nausea, vomiting, diaphoresis, and " "syncope. No pleuritic chest pain, unilateral leg swelling, calf tenderness, or calf pain.     Shira will return to clinic in a few months for further workup and reassessment or sooner as needed. She was instructed to call the clinic or go to the emergency department if her symptoms do not improve, worsens, or if new symptoms develop. As we discussed that symptoms could worsen over the next 24 hours she was advised that if any increased swelling, pain, or numbness arise to go immediately to the ED. Patient knows to call any time if an emergency arises. Shared decision making occurred and she verbalized understanding in agreement with this plan.     274}    BMI Goal    Counseled patient on her ideal body weight, health consequences of being obese and current recommendations including weekly exercise and a heart healthy diet.  She is aware that ideal BMI < 25  Estimated body mass index is 26.87 kg/m² as calculated from the following:    Height as of this encounter: 5' 4" (1.626 m).    Weight as of this encounter: 71 kg (156 lb 8.4 oz).     She was counseled about the importance of healthy dietary habits as well as routine physical activity and exercise for better health outcomes. I also discussed the importance of cancer screening.     Medication Monitoring    In today's visit, monitoring for drug toxicity was accomplished. Proper use of medications was also discussed.     I discussed imaging findings, diagnosis, possibilities, treatment options, medications, risks, and benefits. She had many questions regarding the options and long-term effects. All questions were answered. She expressed understanding after counseling regarding the diagnosis and recommendations. She was capable and demonstrated competence with understanding of these options. Shared decision making was performed resulting in her choosing the current treatment plan. Patient handout was given with instructions and recommendations. Advised the patient " that if they become pregnant to alert us immediately to assess for medication changes.     I also discussed the importance of close follow up to discuss labs, change or modify her medications if needed, monitor side effects, and further evaluation of medical problems.     Additional workup planned: see labs ordered below.    See below for labs and meds ordered with associated diagnosis      1. Encounter for preventive health examination  - CBC Auto Differential; Future  - Comprehensive Metabolic Panel; Future  - Hemoglobin A1C; Future  - Lipid Panel; Future    2. Colon cancer screening    3. Nodule of finger, right  - X-Ray Finger 2 or More Views Right; Future    4. Family history of colon cancer in father  - Ambulatory referral/consult to Gastroenterology; Future    5. Gastroesophageal reflux disease, unspecified whether esophagitis present    6. Night sweats  - TSH; Future  - Iron and TIBC; Future  - QUANTIFERON GOLD TB; Future  - HIV 1/2 Ag/Ab (4th Gen); Future    7. Encounter for HCV screening test for low risk patient  - Hepatitis C Antibody; Future    8. Abnormal finding of blood chemistry, unspecified  - Vitamin B12 Deficiency Panel; Future    Medication List with Changes/Refills   Current Medications    LEVONORGESTREL (MIRENA) 20 MCG/24 HOURS (5 YRS) 52 MG IUD    1 Intra Uterine Device by Intrauterine route once. for 1 dose     Modified Medications    No medications on file       Ronn De Oliveira MD   274}  02/14/2022     This note was completed with dictation software and grammatical errors may exist.    If you are due for any health screening(s) below please notify me so we can arrange them to be ordered and scheduled to maintain your health.     Health Maintenance Due   Topic Date Due    Hepatitis C Screening  Never done    HIV Screening  Never done    Influenza Vaccine (1) Never done    COVID-19 Vaccine (3 - Booster) 10/23/2021

## 2022-02-15 ENCOUNTER — TELEPHONE (OUTPATIENT)
Dept: GASTROENTEROLOGY | Facility: CLINIC | Age: 32
End: 2022-02-15
Payer: COMMERCIAL

## 2022-02-15 NOTE — TELEPHONE ENCOUNTER
GI referral to consult about colonoscopy scheduled with patient: 2/21/2022 1222 Jaimie Meneses NP [facility].

## 2022-02-16 ENCOUNTER — PATIENT MESSAGE (OUTPATIENT)
Dept: FAMILY MEDICINE | Facility: CLINIC | Age: 32
End: 2022-02-16
Payer: COMMERCIAL

## 2022-02-16 DIAGNOSIS — E53.8 B12 DEFICIENCY: Primary | ICD-10-CM

## 2022-02-16 RX ORDER — LANOLIN ALCOHOL/MO/W.PET/CERES
1000 CREAM (GRAM) TOPICAL DAILY
Qty: 90 TABLET | Refills: 3 | Status: SHIPPED | OUTPATIENT
Start: 2022-02-16 | End: 2023-02-16

## 2022-02-21 ENCOUNTER — OFFICE VISIT (OUTPATIENT)
Dept: GASTROENTEROLOGY | Facility: CLINIC | Age: 32
End: 2022-02-21
Payer: COMMERCIAL

## 2022-02-21 VITALS — HEIGHT: 64 IN | BODY MASS INDEX: 26.98 KG/M2 | WEIGHT: 158.06 LBS

## 2022-02-21 DIAGNOSIS — Z80.0 FAMILY HISTORY OF COLON CANCER IN FATHER: Primary | ICD-10-CM

## 2022-02-21 DIAGNOSIS — Z87.19 HISTORY OF GASTROESOPHAGEAL REFLUX (GERD): ICD-10-CM

## 2022-02-21 PROCEDURE — 99202 PR OFFICE/OUTPT VISIT, NEW, LEVL II, 15-29 MIN: ICD-10-PCS | Mod: S$GLB,,, | Performed by: NURSE PRACTITIONER

## 2022-02-21 PROCEDURE — 1160F RVW MEDS BY RX/DR IN RCRD: CPT | Mod: CPTII,S$GLB,, | Performed by: NURSE PRACTITIONER

## 2022-02-21 PROCEDURE — 1160F PR REVIEW ALL MEDS BY PRESCRIBER/CLIN PHARMACIST DOCUMENTED: ICD-10-PCS | Mod: CPTII,S$GLB,, | Performed by: NURSE PRACTITIONER

## 2022-02-21 PROCEDURE — 3044F PR MOST RECENT HEMOGLOBIN A1C LEVEL <7.0%: ICD-10-PCS | Mod: CPTII,S$GLB,, | Performed by: NURSE PRACTITIONER

## 2022-02-21 PROCEDURE — 1159F PR MEDICATION LIST DOCUMENTED IN MEDICAL RECORD: ICD-10-PCS | Mod: CPTII,S$GLB,, | Performed by: NURSE PRACTITIONER

## 2022-02-21 PROCEDURE — 99999 PR PBB SHADOW E&M-EST. PATIENT-LVL III: CPT | Mod: PBBFAC,,, | Performed by: NURSE PRACTITIONER

## 2022-02-21 PROCEDURE — 3044F HG A1C LEVEL LT 7.0%: CPT | Mod: CPTII,S$GLB,, | Performed by: NURSE PRACTITIONER

## 2022-02-21 PROCEDURE — 99202 OFFICE O/P NEW SF 15 MIN: CPT | Mod: S$GLB,,, | Performed by: NURSE PRACTITIONER

## 2022-02-21 PROCEDURE — 1159F MED LIST DOCD IN RCRD: CPT | Mod: CPTII,S$GLB,, | Performed by: NURSE PRACTITIONER

## 2022-02-21 PROCEDURE — 3008F BODY MASS INDEX DOCD: CPT | Mod: CPTII,S$GLB,, | Performed by: NURSE PRACTITIONER

## 2022-02-21 PROCEDURE — 3008F PR BODY MASS INDEX (BMI) DOCUMENTED: ICD-10-PCS | Mod: CPTII,S$GLB,, | Performed by: NURSE PRACTITIONER

## 2022-02-21 PROCEDURE — 99999 PR PBB SHADOW E&M-EST. PATIENT-LVL III: ICD-10-PCS | Mod: PBBFAC,,, | Performed by: NURSE PRACTITIONER

## 2022-02-21 NOTE — PROGRESS NOTES
Subjective:       Patient ID: Shira Ashton is a 31 y.o. female, Body mass index is 27.13 kg/m².    Chief Complaint: Other      Patient is new to me. Referred by Dr. De Oliveira for family history of colon cancer in father.    GI Problem  Primary symptoms do not include fever, weight loss, fatigue, abdominal pain, nausea, vomiting, diarrhea, melena, hematemesis, jaundice, hematochezia, dysuria or rash.   The illness does not include chills, anorexia, dysphagia, odynophagia, bloating or constipation. Associated symptoms comments: Patient referred by her PCP for family history of colon cancer in father; diagnosed at age 41. No prior hx of c-scope. Feeling well, no complaints. Significant associated medical issues include GERD (Hx of GERD; denies heartburn/dyspepsia; well controlled with diet). Associated medical issues do not include inflammatory bowel disease, gallstones, liver disease, alcohol abuse, PUD, gastric bypass, bowel resection, irritable bowel syndrome, hemorrhoids or diverticulitis.     Review of Systems   Constitutional: Negative for appetite change, chills, fatigue, fever, unexpected weight change and weight loss.   HENT: Negative for trouble swallowing.    Respiratory: Negative for cough and shortness of breath.    Cardiovascular: Negative for chest pain.   Gastrointestinal: Negative for abdominal distention, abdominal pain, anal bleeding, anorexia, bloating, blood in stool, constipation, diarrhea, dysphagia, hematemesis, hematochezia, jaundice, melena, nausea, rectal pain and vomiting.   Genitourinary: Negative for difficulty urinating and dysuria.   Musculoskeletal: Negative for gait problem.   Skin: Negative for rash.   Neurological: Negative for speech difficulty.   Psychiatric/Behavioral: Negative for confusion.       Past Medical History:   Diagnosis Date    Abnormal Pap smear of cervix 2010    GERD (gastroesophageal reflux disease)     Vocal nodules in adults       Past Surgical History:    Procedure Laterality Date    WISDOM TOOTH EXTRACTION        Family History   Problem Relation Age of Onset    Breast cancer Maternal Grandmother     Colon cancer Father 41    Diabetes Father     Hypertension Father     Coronary artery disease Father         bypass    Psoriasis Father         psoriatic arthritis     Cancer Father     Breast cancer Maternal Aunt     Diabetes Paternal Aunt     Coronary artery disease Paternal Aunt     Heart attacks under age 50 Paternal Aunt     Diverticulitis Mother     Eclampsia Neg Hx     Stroke Neg Hx     Miscarriages / Stillbirths Neg Hx     Ovarian cancer Neg Hx      labor Neg Hx       Wt Readings from Last 10 Encounters:   22 71.7 kg (158 lb 1.1 oz)   22 71 kg (156 lb 8.4 oz)   20 69.5 kg (153 lb 3.5 oz)   09/10/19 67.6 kg (149 lb)   08/15/19 64.4 kg (141 lb 15.6 oz)   19 67.8 kg (149 lb 7.6 oz)   19 65.5 kg (144 lb 6.4 oz)   19 69.2 kg (152 lb 8.9 oz)   18 66 kg (145 lb 8.1 oz)   18 65.7 kg (144 lb 13.5 oz)     Lab Results   Component Value Date    WBC 6.53 2022    HGB 13.9 2022    HCT 40.2 2022    MCV 95 2022     2022     CMP  Sodium   Date Value Ref Range Status   2022 139 136 - 145 mmol/L Final     Potassium   Date Value Ref Range Status   2022 4.1 3.5 - 5.1 mmol/L Final     Chloride   Date Value Ref Range Status   2022 103 95 - 110 mmol/L Final     CO2   Date Value Ref Range Status   2022 24 23 - 29 mmol/L Final     Glucose   Date Value Ref Range Status   2022 74 70 - 110 mg/dL Final     BUN   Date Value Ref Range Status   2022 11 6 - 20 mg/dL Final     Creatinine   Date Value Ref Range Status   2022 0.8 0.5 - 1.4 mg/dL Final     Calcium   Date Value Ref Range Status   2022 10.1 8.7 - 10.5 mg/dL Final     Total Protein   Date Value Ref Range Status   2022 7.2 6.0 - 8.4 g/dL Final     Albumin   Date Value Ref  Range Status   02/14/2022 4.4 3.5 - 5.2 g/dL Final     Total Bilirubin   Date Value Ref Range Status   02/14/2022 1.0 0.1 - 1.0 mg/dL Final     Comment:     For infants and newborns, interpretation of results should be based  on gestational age, weight and in agreement with clinical  observations.    Premature Infant recommended reference ranges:  Up to 24 hours.............<8.0 mg/dL  Up to 48 hours............<12.0 mg/dL  3-5 days..................<15.0 mg/dL  6-29 days.................<15.0 mg/dL       Alkaline Phosphatase   Date Value Ref Range Status   02/14/2022 58 55 - 135 U/L Final     AST   Date Value Ref Range Status   02/14/2022 15 10 - 40 U/L Final     ALT   Date Value Ref Range Status   02/14/2022 18 10 - 44 U/L Final     Anion Gap   Date Value Ref Range Status   02/14/2022 12 8 - 16 mmol/L Final     eGFR if    Date Value Ref Range Status   02/14/2022 >60.0 >60 mL/min/1.73 m^2 Final     eGFR if non    Date Value Ref Range Status   02/14/2022 >60.0 >60 mL/min/1.73 m^2 Final     Comment:     Calculation used to obtain the estimated glomerular filtration  rate (eGFR) is the CKD-EPI equation.                 Reviewed prior medical records including endoscopy history (see surgical history).     Objective:      Physical Exam  Constitutional:       General: She is not in acute distress.     Appearance: She is well-developed.   HENT:      Head: Normocephalic.      Right Ear: Hearing normal.      Left Ear: Hearing normal.      Nose: Nose normal.      Mouth/Throat:      Comments: Pt wearing mask due to COVID concerns  Eyes:      General: Lids are normal.      Conjunctiva/sclera: Conjunctivae normal.      Pupils: Pupils are equal, round, and reactive to light.   Neck:      Trachea: Trachea normal.   Cardiovascular:      Rate and Rhythm: Normal rate and regular rhythm.      Heart sounds: Normal heart sounds. No murmur heard.  Pulmonary:      Effort: Pulmonary effort is normal. No  respiratory distress.      Breath sounds: Normal breath sounds. No stridor. No wheezing.   Abdominal:      General: Bowel sounds are normal. There is no distension.      Palpations: Abdomen is soft. There is no mass.      Tenderness: There is no abdominal tenderness. There is no guarding or rebound.   Musculoskeletal:         General: Normal range of motion.      Cervical back: Normal range of motion.   Skin:     General: Skin is warm and dry.      Findings: No rash.      Comments: Non jaundiced   Neurological:      Mental Status: She is alert and oriented to person, place, and time.   Psychiatric:         Speech: Speech normal.         Behavior: Behavior normal. Behavior is cooperative.           Assessment:       1. Family history of colon cancer in father    2. History of gastroesophageal reflux (GERD)           Plan:   All diagnoses and orders for this visit:    Family history of colon cancer in father  - Surveillance colonoscopy due at age 32 (patient will call office to schedule)    History of gastroesophageal reflux (GERD)   - Recommend to avoid large meals, avoid eating within 3 hours of bedtime, elevate head of bed if nocturnal symptoms are present, smoking cessation (if current smoker), & weight loss (if overweight).    - Recommend minimize/avoid high-fat foods, chocolate, caffeine, citrus, alcohol, & tomato products.   - Advised to avoid/limit use of NSAID's, since they can cause GI upset, bleeding, and/or ulcers. If needed, take with food.     If no improvement in symptoms or symptoms worsen, call/follow-up at clinic or go to ER

## 2022-02-28 ENCOUNTER — PATIENT OUTREACH (OUTPATIENT)
Dept: ADMINISTRATIVE | Facility: OTHER | Age: 32
End: 2022-02-28
Payer: COMMERCIAL

## 2022-03-02 ENCOUNTER — OFFICE VISIT (OUTPATIENT)
Dept: OBSTETRICS AND GYNECOLOGY | Facility: CLINIC | Age: 32
End: 2022-03-02
Payer: COMMERCIAL

## 2022-03-02 VITALS
BODY MASS INDEX: 26.91 KG/M2 | WEIGHT: 157.63 LBS | SYSTOLIC BLOOD PRESSURE: 112 MMHG | DIASTOLIC BLOOD PRESSURE: 72 MMHG | HEIGHT: 64 IN

## 2022-03-02 DIAGNOSIS — Z01.419 WELL WOMAN EXAM WITH ROUTINE GYNECOLOGICAL EXAM: Primary | ICD-10-CM

## 2022-03-02 PROCEDURE — 88175 CYTOPATH C/V AUTO FLUID REDO: CPT | Performed by: OBSTETRICS & GYNECOLOGY

## 2022-03-02 PROCEDURE — 3008F PR BODY MASS INDEX (BMI) DOCUMENTED: ICD-10-PCS | Mod: CPTII,S$GLB,, | Performed by: OBSTETRICS & GYNECOLOGY

## 2022-03-02 PROCEDURE — 99395 PREV VISIT EST AGE 18-39: CPT | Mod: S$GLB,,, | Performed by: OBSTETRICS & GYNECOLOGY

## 2022-03-02 PROCEDURE — 1159F MED LIST DOCD IN RCRD: CPT | Mod: CPTII,S$GLB,, | Performed by: OBSTETRICS & GYNECOLOGY

## 2022-03-02 PROCEDURE — 99395 PR PREVENTIVE VISIT,EST,18-39: ICD-10-PCS | Mod: S$GLB,,, | Performed by: OBSTETRICS & GYNECOLOGY

## 2022-03-02 PROCEDURE — 87625 HPV TYPES 16 & 18 ONLY: CPT | Mod: 59 | Performed by: OBSTETRICS & GYNECOLOGY

## 2022-03-02 PROCEDURE — 3044F PR MOST RECENT HEMOGLOBIN A1C LEVEL <7.0%: ICD-10-PCS | Mod: CPTII,S$GLB,, | Performed by: OBSTETRICS & GYNECOLOGY

## 2022-03-02 PROCEDURE — 99999 PR PBB SHADOW E&M-EST. PATIENT-LVL III: ICD-10-PCS | Mod: PBBFAC,,, | Performed by: OBSTETRICS & GYNECOLOGY

## 2022-03-02 PROCEDURE — 87624 HPV HI-RISK TYP POOLED RSLT: CPT | Performed by: OBSTETRICS & GYNECOLOGY

## 2022-03-02 PROCEDURE — 3074F SYST BP LT 130 MM HG: CPT | Mod: CPTII,S$GLB,, | Performed by: OBSTETRICS & GYNECOLOGY

## 2022-03-02 PROCEDURE — 3078F PR MOST RECENT DIASTOLIC BLOOD PRESSURE < 80 MM HG: ICD-10-PCS | Mod: CPTII,S$GLB,, | Performed by: OBSTETRICS & GYNECOLOGY

## 2022-03-02 PROCEDURE — 3074F PR MOST RECENT SYSTOLIC BLOOD PRESSURE < 130 MM HG: ICD-10-PCS | Mod: CPTII,S$GLB,, | Performed by: OBSTETRICS & GYNECOLOGY

## 2022-03-02 PROCEDURE — 3008F BODY MASS INDEX DOCD: CPT | Mod: CPTII,S$GLB,, | Performed by: OBSTETRICS & GYNECOLOGY

## 2022-03-02 PROCEDURE — 99999 PR PBB SHADOW E&M-EST. PATIENT-LVL III: CPT | Mod: PBBFAC,,, | Performed by: OBSTETRICS & GYNECOLOGY

## 2022-03-02 PROCEDURE — 3078F DIAST BP <80 MM HG: CPT | Mod: CPTII,S$GLB,, | Performed by: OBSTETRICS & GYNECOLOGY

## 2022-03-02 PROCEDURE — 3044F HG A1C LEVEL LT 7.0%: CPT | Mod: CPTII,S$GLB,, | Performed by: OBSTETRICS & GYNECOLOGY

## 2022-03-02 PROCEDURE — 1159F PR MEDICATION LIST DOCUMENTED IN MEDICAL RECORD: ICD-10-PCS | Mod: CPTII,S$GLB,, | Performed by: OBSTETRICS & GYNECOLOGY

## 2022-03-02 NOTE — PROGRESS NOTES
History & Physical  Gynecology      SUBJECTIVE:     Chief Complaint: Annual Exam       History of Present Illness:  Annual Exam-Premenopausal  Patient presents for annual exam. The patient has complaints today of difficulty achieving an orgasm.  She is worried this is a result of the IUD. Menstrual cycles are absent- but has symptoms of PMS and back pain with ovulation.  The patient is sexually active. GYN screening history: last pap: approximate date 2019 and was normal. The patient participates in regular exercise: yes.  Smoking status:  no    Contraception: IUD    FH:  Breast cancer: mat gm, mat aunt  Colon cancer: father age 41; recently  with covid age 70s;   Ovarian cancer: neg    Review of patient's allergies indicates:  No Known Allergies    Past Medical History:   Diagnosis Date    Abnormal Pap smear of cervix     GERD (gastroesophageal reflux disease)     Vocal nodules in adults      Past Surgical History:   Procedure Laterality Date    WISDOM TOOTH EXTRACTION       OB History        0    Para   0    Term   0       0    AB   0    Living   0       SAB   0    IAB   0    Ectopic   0    Multiple   0    Live Births   0               Family History   Problem Relation Age of Onset    Breast cancer Maternal Grandmother     Colon cancer Father 41    Diabetes Father     Hypertension Father     Coronary artery disease Father         bypass    Psoriasis Father         psoriatic arthritis     Cancer Father     Breast cancer Maternal Aunt     Diabetes Paternal Aunt     Coronary artery disease Paternal Aunt     Heart attacks under age 50 Paternal Aunt     Diverticulitis Mother     Eclampsia Neg Hx     Stroke Neg Hx     Miscarriages / Stillbirths Neg Hx     Ovarian cancer Neg Hx      labor Neg Hx      Social History     Tobacco Use    Smoking status: Never Smoker    Smokeless tobacco: Never Used   Substance Use Topics    Alcohol use: Yes    Drug use: No        Current Outpatient Medications   Medication Sig    cyanocobalamin (VITAMIN B-12) 1000 MCG tablet Take 1 tablet (1,000 mcg total) by mouth once daily. (Patient not taking: No sig reported)    levonorgestrel (MIRENA) 20 mcg/24 hours (5 yrs) 52 mg IUD 1 Intra Uterine Device by Intrauterine route once. for 1 dose     Current Facility-Administered Medications   Medication    levonorgestrel 20 mcg/24 hours (5 yrs) 52 mg IUD 1 Intra Uterine Device         Review of Systems:  Review of Systems   Constitutional: Negative for appetite change, fever and unexpected weight change.   Respiratory: Negative for shortness of breath.    Cardiovascular: Negative for chest pain.   Gastrointestinal: Negative for nausea and vomiting.   Genitourinary: Negative for menorrhagia, menstrual problem, pelvic pain, vaginal bleeding, vaginal discharge and vaginal pain.   Integumentary:  Negative for breast mass.   Breast: Negative for lump and mass       OBJECTIVE:     Physical Exam:  Physical Exam  Vitals and nursing note reviewed.   Constitutional:       Appearance: She is well-developed.   Neck:      Thyroid: No thyromegaly.      Trachea: No tracheal deviation.   Cardiovascular:      Rate and Rhythm: Normal rate and regular rhythm.      Heart sounds: Normal heart sounds.   Pulmonary:      Effort: Pulmonary effort is normal.      Breath sounds: Normal breath sounds.   Chest:   Breasts: Breasts are symmetrical.      Right: No inverted nipple, mass, nipple discharge, skin change or tenderness.      Left: No inverted nipple, mass, nipple discharge, skin change or tenderness.       Abdominal:      Palpations: Abdomen is soft.   Genitourinary:     General: Normal vulva.      Labia:         Right: No rash, tenderness, lesion or injury.         Left: No rash, tenderness, lesion or injury.       Urethra: No prolapse, urethral pain, urethral swelling or urethral lesion.      Vagina: Normal. No signs of injury and foreign body. No vaginal  discharge, erythema, tenderness or bleeding.      Cervix: No cervical motion tenderness, discharge or friability.      Uterus: Not deviated, not enlarged, not fixed and not tender.       Adnexa:         Right: No mass, tenderness or fullness.          Left: No mass, tenderness or fullness.        Rectum: No anal fissure or external hemorrhoid.      Comments: Urethral meatus: normal size, anterior vaginal wall with no prolapse, no lesions  Bladder: no fullness, masses or tenderness  Musculoskeletal:      Cervical back: Normal range of motion and neck supple.   Neurological:      Mental Status: She is alert and oriented to person, place, and time.   Psychiatric:         Behavior: Behavior normal.         Thought Content: Thought content normal.         Judgment: Judgment normal.         Chaperoned by: Tiffani    ASSESSMENT:       ICD-10-CM ICD-9-CM    1. Well woman exam with routine gynecological exam  Z01.419 V72.31 Liquid-Based Pap Smear, Screening      HPV High Risk Genotypes, PCR          Plan:      Shira was seen today for annual exam.    Diagnoses and all orders for this visit:    Well woman exam with routine gynecological exam  -     Liquid-Based Pap Smear, Screening  -     HPV High Risk Genotypes, PCR        Orders Placed This Encounter   Procedures    HPV High Risk Genotypes, PCR       Well Woman:  - Pap smear and hpv today  - Birth control: iud- discussed that sexual side effects are less common with IUD; discussed removing or continuing with IUD; wants to continue for now  - GC/CT:n/a  - Mammogram: n/a  - Smoking cessation: n/a  - Labs: none required   - Vaccines: s/p gardasil  - Exercise counseling  - has GI appt planned to then get a colonoscopy    Follow up in  one year for annual or prn.    Kayce Fong

## 2022-03-09 LAB
CLINICAL INFO: ABNORMAL
CYTO CVX: ABNORMAL
CYTOLOGIST CVX/VAG CYTO: ABNORMAL
CYTOLOGIST CVX/VAG CYTO: ABNORMAL
CYTOLOGY CMNT CVX/VAG CYTO-IMP: ABNORMAL
CYTOLOGY PAP THIN PREP EXPLANATION: ABNORMAL
DATE OF PREVIOUS PAP: ABNORMAL
DATE PREVIOUS BX: NO
GEN CATEG CVX/VAG CYTO-IMP: ABNORMAL
HPV I/H RISK 4 DNA CVX QL NAA+PROBE: DETECTED
HPV16 DNA CVX QL PROBE+SIG AMP: NOT DETECTED
HPV18 DNA CVX QL PROBE+SIG AMP: NOT DETECTED
LMP START DATE: ABNORMAL
MICROORGANISM CVX/VAG CYTO: ABNORMAL
PATHOLOGIST CVX/VAG CYTO: ABNORMAL
SERVICE CMNT-IMP: ABNORMAL
SPECIMEN SOURCE CVX/VAG CYTO: ABNORMAL
STAT OF ADQ CVX/VAG CYTO-IMP: ABNORMAL

## 2022-03-14 ENCOUNTER — TELEPHONE (OUTPATIENT)
Dept: OBSTETRICS AND GYNECOLOGY | Facility: CLINIC | Age: 32
End: 2022-03-14
Payer: COMMERCIAL

## 2022-03-15 ENCOUNTER — TELEPHONE (OUTPATIENT)
Dept: OBSTETRICS AND GYNECOLOGY | Facility: CLINIC | Age: 32
End: 2022-03-15
Payer: COMMERCIAL

## 2022-04-26 ENCOUNTER — PROCEDURE VISIT (OUTPATIENT)
Dept: OBSTETRICS AND GYNECOLOGY | Facility: CLINIC | Age: 32
End: 2022-04-26
Payer: COMMERCIAL

## 2022-04-26 VITALS
DIASTOLIC BLOOD PRESSURE: 76 MMHG | HEIGHT: 64 IN | WEIGHT: 154.13 LBS | SYSTOLIC BLOOD PRESSURE: 134 MMHG | BODY MASS INDEX: 26.31 KG/M2

## 2022-04-26 DIAGNOSIS — Z01.818 PRE-OP TESTING: ICD-10-CM

## 2022-04-26 DIAGNOSIS — R87.810 ASCUS WITH POSITIVE HIGH RISK HPV CERVICAL: Primary | ICD-10-CM

## 2022-04-26 DIAGNOSIS — R87.610 ASCUS WITH POSITIVE HIGH RISK HPV CERVICAL: Primary | ICD-10-CM

## 2022-04-26 LAB
B-HCG UR QL: NEGATIVE
CTP QC/QA: YES

## 2022-04-26 PROCEDURE — 57454 COLPOSCOPY: ICD-10-PCS | Mod: S$GLB,,, | Performed by: OBSTETRICS & GYNECOLOGY

## 2022-04-26 PROCEDURE — 81025 POCT URINE PREGNANCY: ICD-10-PCS | Mod: S$GLB,,, | Performed by: OBSTETRICS & GYNECOLOGY

## 2022-04-26 PROCEDURE — 88342 CHG IMMUNOCYTOCHEMISTRY: ICD-10-PCS | Mod: 26,,, | Performed by: PATHOLOGY

## 2022-04-26 PROCEDURE — 88342 IMHCHEM/IMCYTCHM 1ST ANTB: CPT | Performed by: PATHOLOGY

## 2022-04-26 PROCEDURE — 88305 TISSUE EXAM BY PATHOLOGIST: CPT | Performed by: PATHOLOGY

## 2022-04-26 PROCEDURE — 81025 URINE PREGNANCY TEST: CPT | Mod: S$GLB,,, | Performed by: OBSTETRICS & GYNECOLOGY

## 2022-04-26 PROCEDURE — 57454 BX/CURETT OF CERVIX W/SCOPE: CPT | Mod: S$GLB,,, | Performed by: OBSTETRICS & GYNECOLOGY

## 2022-04-26 PROCEDURE — 88305 TISSUE EXAM BY PATHOLOGIST: ICD-10-PCS | Mod: 26,,, | Performed by: PATHOLOGY

## 2022-04-26 PROCEDURE — 88305 TISSUE EXAM BY PATHOLOGIST: CPT | Mod: 26,,, | Performed by: PATHOLOGY

## 2022-04-26 PROCEDURE — 88342 IMHCHEM/IMCYTCHM 1ST ANTB: CPT | Mod: 26,,, | Performed by: PATHOLOGY

## 2022-04-26 NOTE — PROCEDURES
Colposcopy    Date/Time: 4/26/2022 1:30 PM  Performed by: Kayce Fong MD  Authorized by: Kayce Fong MD     Consent Done?:  Yes (Written)  Timeout:Immediately prior to procedure a time out was called to verify the correct patient, procedure, equipment, support staff and site/side marked as required  Assistants?: Yes    List of assistants:  Neetu BLACK was present for the entire procedure.    Colposcopy Site:  Cervix  Position:  Supine  Acrowhite Lesion? Yes (minimal)    Atypical Vessels? Yes (minimal)    Transformation Zone Adequate?: Yes    Biopsy?: Yes         Location:  Cervix ((12 00 and 4 00))  ECC Performed?: Yes    LEEP Performed?: No    Estimated blood loss (cc):  1   Patient tolerated the procedure well with no immediate complications.   Post-operative instructions were provided for the patient.   Patient was discharged and will follow up if any complications occur    Minimal acetowhite changes on the cervix and very minimal hypervascularity; two biopsies taken; suspect will result in MARGARITA 1.     Kayce Fong

## 2022-05-04 LAB
FINAL PATHOLOGIC DIAGNOSIS: NORMAL
Lab: NORMAL

## 2022-05-10 ENCOUNTER — TELEPHONE (OUTPATIENT)
Dept: OBSTETRICS AND GYNECOLOGY | Facility: CLINIC | Age: 32
End: 2022-05-10
Payer: COMMERCIAL

## 2022-05-10 ENCOUNTER — PATIENT MESSAGE (OUTPATIENT)
Dept: OBSTETRICS AND GYNECOLOGY | Facility: CLINIC | Age: 32
End: 2022-05-10
Payer: COMMERCIAL

## 2022-05-10 NOTE — TELEPHONE ENCOUNTER
Patient called.  Results of biopsy discussed in detail along with options for management below.  Patient wants to discuss with partner and notify me whether she would prefer treatment vs monitoring.    2019 ASCCP guidelines:  I.3 Management of MARGARITA 2 in Those Who Are Concerned About the Potential Effect of Treatment on Future Pregnancy Outcomes  Guideline: For patients with a diagnosis of histologic HSIL (MARGARITA 2) whose concerns about the effects of treatment on a future pregnancy outweigh their concerns about cancer, either observation or treatment is acceptable provided the squamocolumnar junction is visible and MARGARITA 2+ or ungraded MARGARITA is not identified on endocervical sampling (CII) (see Figure 8). If the histologic HSIL cannot be specified as MARGARITA 2, treatment is preferred, but observation is acceptable (CIII). For patients 25 years or older, observation includes colposcopy and HPV-based testing at 6-month intervals for up to 2 years (See Section K.1 for management age of younger than 25 years). If during surveillance, all evaluations demonstrate less than MARGARITA 2 and less than ASC-H on 2 successive occasions, 6 months apart, subsequent surveillance should occur at 1 year after the second evaluation and use HPV-based testing. If negative on 3 consecutive annual surveillance tests, proceed to long-term surveillance (Section J.3). If MARGARITA 2 remains present for a 2-year period, treatment is recommended (CII) (see Figure 8).   Rationale: Unlike MARGARITA 3, which is considered a direct cancer precursor, MARGARITA 2 has an appreciable regression rate. A systematic review and meta-analysis of studies from 1973 to 2016 indicated that among MARGARITA 2 managed conservatively, 50% regressed, 32% persisted, and 18% progressed to MARGARITA 3+. Notably, most regression  occurred within the first 12 months, whereas rates of progression continued to increase over time. Regression rates were higher (60%) in women younger than 30 years.29 A recent study at  the [facility] of 2,417 patients followed for a median of 48 months with colposcopy and cotesting at 6-month intervals found similar results:  50% regressed to MARGARITA 1 or less, though remained in intensive surveillance for persistent HPV positivity, 30% were treated for persistence or progression, and 20% returned to routine screening. Six patients in the [facility] cohort developed cervical cancer, half of whom had significant follow-up delays.27 The primary rationale for deferring treatment of MARGARITA 2 is the potential risk of adverse obstetric outcomes after excisional or ablative therapy; however, the magnitude of this risk is debated.104 Studies are complicated by the finding that patients with untreated MARGARITA have a higher risk of premature delivery than the general population.105,106 Although several studies have concluded that excision is associated with increased risk of  birth, especially as excision depth increases,104,105,107-109 others have found no  such association after adjustment for potential confounding factors.110-113 Ablation treatment seems to have little or no effect on adverse pregnancy outcomes.105,107,108,114 A [employer]ack[employer] Review concluded that results should be interpreted with caution because of data being of low or very low quality.105

## 2022-05-23 ENCOUNTER — PATIENT MESSAGE (OUTPATIENT)
Dept: OBSTETRICS AND GYNECOLOGY | Facility: CLINIC | Age: 32
End: 2022-05-23
Payer: COMMERCIAL

## 2022-06-01 ENCOUNTER — PATIENT MESSAGE (OUTPATIENT)
Dept: FAMILY MEDICINE | Facility: CLINIC | Age: 32
End: 2022-06-01
Payer: COMMERCIAL

## 2022-06-01 NOTE — TELEPHONE ENCOUNTER
Called and spoke with pt. Informed pt of lab results with low B12 and B12 was ordered. Pt verbalized understanding and states she will call the pharmacy to get B12 and then call back to schedule labs.

## 2022-06-14 ENCOUNTER — PATIENT MESSAGE (OUTPATIENT)
Dept: OBSTETRICS AND GYNECOLOGY | Facility: CLINIC | Age: 32
End: 2022-06-14
Payer: COMMERCIAL

## 2022-10-05 ENCOUNTER — PROCEDURE VISIT (OUTPATIENT)
Dept: OBSTETRICS AND GYNECOLOGY | Facility: CLINIC | Age: 32
End: 2022-10-05
Payer: COMMERCIAL

## 2022-10-05 VITALS
HEIGHT: 64 IN | WEIGHT: 152.31 LBS | SYSTOLIC BLOOD PRESSURE: 128 MMHG | HEART RATE: 70 BPM | DIASTOLIC BLOOD PRESSURE: 73 MMHG | BODY MASS INDEX: 26 KG/M2

## 2022-10-05 DIAGNOSIS — R87.810 ASCUS WITH POSITIVE HIGH RISK HPV CERVICAL: Primary | ICD-10-CM

## 2022-10-05 DIAGNOSIS — N87.1 DYSPLASIA OF CERVIX, HIGH GRADE CIN 2: ICD-10-CM

## 2022-10-05 DIAGNOSIS — R87.610 ASCUS WITH POSITIVE HIGH RISK HPV CERVICAL: Primary | ICD-10-CM

## 2022-10-05 PROCEDURE — 88342 IMHCHEM/IMCYTCHM 1ST ANTB: CPT | Performed by: STUDENT IN AN ORGANIZED HEALTH CARE EDUCATION/TRAINING PROGRAM

## 2022-10-05 PROCEDURE — 88141 CYTOPATH C/V INTERPRET: CPT | Mod: ,,, | Performed by: STUDENT IN AN ORGANIZED HEALTH CARE EDUCATION/TRAINING PROGRAM

## 2022-10-05 PROCEDURE — 88342 IMHCHEM/IMCYTCHM 1ST ANTB: CPT | Mod: 26,,, | Performed by: STUDENT IN AN ORGANIZED HEALTH CARE EDUCATION/TRAINING PROGRAM

## 2022-10-05 PROCEDURE — 88305 TISSUE EXAM BY PATHOLOGIST: ICD-10-PCS | Mod: 26,,, | Performed by: STUDENT IN AN ORGANIZED HEALTH CARE EDUCATION/TRAINING PROGRAM

## 2022-10-05 PROCEDURE — 87624 HPV HI-RISK TYP POOLED RSLT: CPT | Performed by: OBSTETRICS & GYNECOLOGY

## 2022-10-05 PROCEDURE — 88141 PR  CYTOPATH CERV/VAG INTERPRET: ICD-10-PCS | Mod: ,,, | Performed by: STUDENT IN AN ORGANIZED HEALTH CARE EDUCATION/TRAINING PROGRAM

## 2022-10-05 PROCEDURE — 57454 BX/CURETT OF CERVIX W/SCOPE: CPT | Mod: S$GLB,,, | Performed by: OBSTETRICS & GYNECOLOGY

## 2022-10-05 PROCEDURE — 88175 CYTOPATH C/V AUTO FLUID REDO: CPT | Performed by: STUDENT IN AN ORGANIZED HEALTH CARE EDUCATION/TRAINING PROGRAM

## 2022-10-05 PROCEDURE — 88305 TISSUE EXAM BY PATHOLOGIST: CPT | Mod: 59 | Performed by: STUDENT IN AN ORGANIZED HEALTH CARE EDUCATION/TRAINING PROGRAM

## 2022-10-05 PROCEDURE — 88342 CHG IMMUNOCYTOCHEMISTRY: ICD-10-PCS | Mod: 26,,, | Performed by: STUDENT IN AN ORGANIZED HEALTH CARE EDUCATION/TRAINING PROGRAM

## 2022-10-05 PROCEDURE — 88305 TISSUE EXAM BY PATHOLOGIST: CPT | Mod: 26,,, | Performed by: STUDENT IN AN ORGANIZED HEALTH CARE EDUCATION/TRAINING PROGRAM

## 2022-10-05 PROCEDURE — 57454 COLPOSCOPY: ICD-10-PCS | Mod: S$GLB,,, | Performed by: OBSTETRICS & GYNECOLOGY

## 2022-10-05 NOTE — PROCEDURES
Colposcopy    Date/Time: 10/5/2022 2:15 PM  Performed by: Kayce Fong MD  Authorized by: Kayce Fong MD     Consent Done?:  Yes (Written)  Timeout:Immediately prior to procedure a time out was called to verify the correct patient, procedure, equipment, support staff and site/side marked as required  Assistants?: Yes    List of assistants:  [place]    Colposcopy Site:  Cervix  Position:  Supine  Acrowhite Lesion? Yes    Atypical Vessels: No    Transformation Zone Adequate?: Yes    Biopsy?: Yes         Location:  Cervix ((4 00 and 6 00))  ECC Performed?: Yes    LEEP Performed?: No    Estimated blood loss (cc):  0   Patient tolerated the procedure well with no immediate complications.   Post-operative instructions were provided for the patient.   Patient was discharged and will follow up if any complications occur    Minimal acetowhite changes; otherwise normal appearing cervix; suspect MARGARITA 1 at most.     Kayce Fong

## 2022-10-11 LAB
FINAL PATHOLOGIC DIAGNOSIS: NORMAL
GROSS: NORMAL
Lab: NORMAL

## 2022-10-12 LAB
HPV HR 12 DNA SPEC QL NAA+PROBE: POSITIVE
HPV16 AG SPEC QL: NEGATIVE
HPV18 DNA SPEC QL NAA+PROBE: NEGATIVE

## 2022-10-17 LAB
FINAL PATHOLOGIC DIAGNOSIS: ABNORMAL
Lab: ABNORMAL

## 2022-10-18 ENCOUNTER — PATIENT MESSAGE (OUTPATIENT)
Dept: OBSTETRICS AND GYNECOLOGY | Facility: CLINIC | Age: 32
End: 2022-10-18
Payer: COMMERCIAL

## 2022-10-26 ENCOUNTER — PATIENT MESSAGE (OUTPATIENT)
Dept: OBSTETRICS AND GYNECOLOGY | Facility: CLINIC | Age: 32
End: 2022-10-26
Payer: COMMERCIAL

## 2022-11-04 ENCOUNTER — TELEPHONE (OUTPATIENT)
Dept: OBSTETRICS AND GYNECOLOGY | Facility: CLINIC | Age: 32
End: 2022-11-04
Payer: COMMERCIAL

## 2022-11-04 NOTE — TELEPHONE ENCOUNTER
Patient called to discuss management of cervix.  No answer, left message to return call.     Kayce Fong

## 2022-11-08 ENCOUNTER — PATIENT MESSAGE (OUTPATIENT)
Dept: OBSTETRICS AND GYNECOLOGY | Facility: CLINIC | Age: 32
End: 2022-11-08
Payer: COMMERCIAL

## 2022-11-08 ENCOUNTER — TELEPHONE (OUTPATIENT)
Dept: OBSTETRICS AND GYNECOLOGY | Facility: CLINIC | Age: 32
End: 2022-11-08
Payer: COMMERCIAL

## 2022-11-08 NOTE — TELEPHONE ENCOUNTER
Patient called.  Discussed recommendations for a procedure given that her ECC is at least MARGARITA 2.  Patient understands and wants to move forward with the procedure.      Kayce Fong

## 2022-11-10 ENCOUNTER — TELEPHONE (OUTPATIENT)
Dept: OBSTETRICS AND GYNECOLOGY | Facility: CLINIC | Age: 32
End: 2022-11-10
Payer: COMMERCIAL

## 2022-11-10 NOTE — TELEPHONE ENCOUNTER
LVM for to read [employer].     ----- Message from Kayce Fong MD sent at 11/10/2022 11:57 AM CST -----  Please call regarding my message.       
sinus tach

## 2022-12-14 ENCOUNTER — PATIENT MESSAGE (OUTPATIENT)
Dept: OBSTETRICS AND GYNECOLOGY | Facility: CLINIC | Age: 32
End: 2022-12-14
Payer: COMMERCIAL

## 2022-12-16 ENCOUNTER — PROCEDURE VISIT (OUTPATIENT)
Dept: OBSTETRICS AND GYNECOLOGY | Facility: CLINIC | Age: 32
End: 2022-12-16
Payer: COMMERCIAL

## 2022-12-16 VITALS
DIASTOLIC BLOOD PRESSURE: 62 MMHG | BODY MASS INDEX: 26.98 KG/M2 | WEIGHT: 157.19 LBS | SYSTOLIC BLOOD PRESSURE: 116 MMHG

## 2022-12-16 DIAGNOSIS — N87.1 DYSPLASIA OF CERVIX, HIGH GRADE CIN 2: Primary | ICD-10-CM

## 2022-12-16 DIAGNOSIS — Z01.818 PRE-OP TESTING: ICD-10-CM

## 2022-12-16 LAB
B-HCG UR QL: NEGATIVE
CTP QC/QA: YES

## 2022-12-16 PROCEDURE — 57505 PR ENDOCERVICAL CURETTAGE: ICD-10-PCS | Mod: 51,S$GLB,, | Performed by: OBSTETRICS & GYNECOLOGY

## 2022-12-16 PROCEDURE — 81025 URINE PREGNANCY TEST: CPT | Mod: S$GLB,,, | Performed by: OBSTETRICS & GYNECOLOGY

## 2022-12-16 PROCEDURE — 57522 CONIZATION OF CERVIX: CPT | Mod: S$GLB,,, | Performed by: OBSTETRICS & GYNECOLOGY

## 2022-12-16 PROCEDURE — 88305 TISSUE EXAM BY PATHOLOGIST: ICD-10-PCS | Mod: 26,,, | Performed by: PATHOLOGY

## 2022-12-16 PROCEDURE — 81025 POCT URINE PREGNANCY: ICD-10-PCS | Mod: S$GLB,,, | Performed by: OBSTETRICS & GYNECOLOGY

## 2022-12-16 PROCEDURE — 57522 PR CONIZATION CERVIX,LOOP ELECTRD: ICD-10-PCS | Mod: S$GLB,,, | Performed by: OBSTETRICS & GYNECOLOGY

## 2022-12-16 PROCEDURE — 57505 ENDOCERVICAL CURETTAGE: CPT | Mod: 51,S$GLB,, | Performed by: OBSTETRICS & GYNECOLOGY

## 2022-12-16 PROCEDURE — 88307 PR  SURG PATH,LEVEL V: ICD-10-PCS | Mod: 26,,, | Performed by: PATHOLOGY

## 2022-12-16 PROCEDURE — 88305 TISSUE EXAM BY PATHOLOGIST: CPT | Performed by: PATHOLOGY

## 2022-12-16 PROCEDURE — 88307 TISSUE EXAM BY PATHOLOGIST: CPT | Mod: 26,,, | Performed by: PATHOLOGY

## 2022-12-16 PROCEDURE — 88305 TISSUE EXAM BY PATHOLOGIST: CPT | Mod: 26,,, | Performed by: PATHOLOGY

## 2022-12-16 PROCEDURE — 88307 TISSUE EXAM BY PATHOLOGIST: CPT | Performed by: PATHOLOGY

## 2022-12-16 NOTE — PROCEDURES
Procedures    Preprocedure diagnosis: MARGARITA 1 ectocervix; MARGARITA 2 endocervix     Post procedure diagnosis: same as above     Procedure:   After consent was obtained, a coated speculum was inserted into the patient's vagina with good visualization of the cervix.  7 cc of 1% bupivacaine with epinepherine was injected into the cervix at 2, 4, 8, and 10 o'clock and within the face of the cervix.  Lugol's was applied to the cervix to delineate non staining areas.  Minimal staining areas were appreciated.  The loop electrosurgical instrument was then passed from the patient's left to right across the cervix to remove the specimen.  A second pass was made to remove the 6 o'clock portino of the cervix.  ECC was performed.  Ball cautery tip was used to cauterize the face of the cervix.  Monsel's was applied for further hemostasis. The patient tolerated the procedure well.       Specimen: Cervical LEEP specimen- stitch at 12, free floating segment from 6 o'lcock position; ECC specimen     Post procedure:   The patient was cautioned against pain or bleeding and advised to call the gynecologist on call or the office in the event that she have heavy vaginal bleeding, pain, purulent discharge, fever, chills, etc.      Kayce Fong

## 2022-12-23 LAB
FINAL PATHOLOGIC DIAGNOSIS: NORMAL
Lab: NORMAL

## 2022-12-28 ENCOUNTER — PATIENT MESSAGE (OUTPATIENT)
Dept: OBSTETRICS AND GYNECOLOGY | Facility: CLINIC | Age: 32
End: 2022-12-28
Payer: COMMERCIAL

## 2023-05-25 ENCOUNTER — LAB VISIT (OUTPATIENT)
Dept: LAB | Facility: HOSPITAL | Age: 33
End: 2023-05-25
Attending: STUDENT IN AN ORGANIZED HEALTH CARE EDUCATION/TRAINING PROGRAM
Payer: COMMERCIAL

## 2023-05-25 ENCOUNTER — OFFICE VISIT (OUTPATIENT)
Dept: FAMILY MEDICINE | Facility: CLINIC | Age: 33
End: 2023-05-25
Payer: COMMERCIAL

## 2023-05-25 VITALS
WEIGHT: 152.13 LBS | TEMPERATURE: 98 F | HEART RATE: 70 BPM | OXYGEN SATURATION: 99 % | RESPIRATION RATE: 16 BRPM | HEIGHT: 64 IN | DIASTOLIC BLOOD PRESSURE: 70 MMHG | BODY MASS INDEX: 25.97 KG/M2 | SYSTOLIC BLOOD PRESSURE: 110 MMHG

## 2023-05-25 DIAGNOSIS — E53.8 B12 DEFICIENCY: ICD-10-CM

## 2023-05-25 DIAGNOSIS — Z00.00 ENCOUNTER FOR PREVENTIVE HEALTH EXAMINATION: ICD-10-CM

## 2023-05-25 DIAGNOSIS — R53.83 FATIGUE, UNSPECIFIED TYPE: ICD-10-CM

## 2023-05-25 DIAGNOSIS — Z12.11 COLON CANCER SCREENING: ICD-10-CM

## 2023-05-25 DIAGNOSIS — Z00.00 ENCOUNTER FOR PREVENTIVE HEALTH EXAMINATION: Primary | ICD-10-CM

## 2023-05-25 PROCEDURE — 82607 VITAMIN B-12: CPT | Performed by: STUDENT IN AN ORGANIZED HEALTH CARE EDUCATION/TRAINING PROGRAM

## 2023-05-25 PROCEDURE — 99395 PREV VISIT EST AGE 18-39: CPT | Mod: S$GLB,,, | Performed by: STUDENT IN AN ORGANIZED HEALTH CARE EDUCATION/TRAINING PROGRAM

## 2023-05-25 PROCEDURE — 99999 PR PBB SHADOW E&M-EST. PATIENT-LVL III: CPT | Mod: PBBFAC,,, | Performed by: STUDENT IN AN ORGANIZED HEALTH CARE EDUCATION/TRAINING PROGRAM

## 2023-05-25 PROCEDURE — 1159F PR MEDICATION LIST DOCUMENTED IN MEDICAL RECORD: ICD-10-PCS | Mod: CPTII,S$GLB,, | Performed by: STUDENT IN AN ORGANIZED HEALTH CARE EDUCATION/TRAINING PROGRAM

## 2023-05-25 PROCEDURE — 36415 COLL VENOUS BLD VENIPUNCTURE: CPT | Mod: PO | Performed by: STUDENT IN AN ORGANIZED HEALTH CARE EDUCATION/TRAINING PROGRAM

## 2023-05-25 PROCEDURE — 3074F PR MOST RECENT SYSTOLIC BLOOD PRESSURE < 130 MM HG: ICD-10-PCS | Mod: CPTII,S$GLB,, | Performed by: STUDENT IN AN ORGANIZED HEALTH CARE EDUCATION/TRAINING PROGRAM

## 2023-05-25 PROCEDURE — 3008F PR BODY MASS INDEX (BMI) DOCUMENTED: ICD-10-PCS | Mod: CPTII,S$GLB,, | Performed by: STUDENT IN AN ORGANIZED HEALTH CARE EDUCATION/TRAINING PROGRAM

## 2023-05-25 PROCEDURE — 80061 LIPID PANEL: CPT | Performed by: STUDENT IN AN ORGANIZED HEALTH CARE EDUCATION/TRAINING PROGRAM

## 2023-05-25 PROCEDURE — 3078F PR MOST RECENT DIASTOLIC BLOOD PRESSURE < 80 MM HG: ICD-10-PCS | Mod: CPTII,S$GLB,, | Performed by: STUDENT IN AN ORGANIZED HEALTH CARE EDUCATION/TRAINING PROGRAM

## 2023-05-25 PROCEDURE — 99999 PR PBB SHADOW E&M-EST. PATIENT-LVL III: ICD-10-PCS | Mod: PBBFAC,,, | Performed by: STUDENT IN AN ORGANIZED HEALTH CARE EDUCATION/TRAINING PROGRAM

## 2023-05-25 PROCEDURE — 83036 HEMOGLOBIN GLYCOSYLATED A1C: CPT | Performed by: STUDENT IN AN ORGANIZED HEALTH CARE EDUCATION/TRAINING PROGRAM

## 2023-05-25 PROCEDURE — 99395 PR PREVENTIVE VISIT,EST,18-39: ICD-10-PCS | Mod: S$GLB,,, | Performed by: STUDENT IN AN ORGANIZED HEALTH CARE EDUCATION/TRAINING PROGRAM

## 2023-05-25 PROCEDURE — 3074F SYST BP LT 130 MM HG: CPT | Mod: CPTII,S$GLB,, | Performed by: STUDENT IN AN ORGANIZED HEALTH CARE EDUCATION/TRAINING PROGRAM

## 2023-05-25 PROCEDURE — 83921 ORGANIC ACID SINGLE QUANT: CPT | Performed by: STUDENT IN AN ORGANIZED HEALTH CARE EDUCATION/TRAINING PROGRAM

## 2023-05-25 PROCEDURE — 3008F BODY MASS INDEX DOCD: CPT | Mod: CPTII,S$GLB,, | Performed by: STUDENT IN AN ORGANIZED HEALTH CARE EDUCATION/TRAINING PROGRAM

## 2023-05-25 PROCEDURE — 1159F MED LIST DOCD IN RCRD: CPT | Mod: CPTII,S$GLB,, | Performed by: STUDENT IN AN ORGANIZED HEALTH CARE EDUCATION/TRAINING PROGRAM

## 2023-05-25 PROCEDURE — 85025 COMPLETE CBC W/AUTO DIFF WBC: CPT | Performed by: STUDENT IN AN ORGANIZED HEALTH CARE EDUCATION/TRAINING PROGRAM

## 2023-05-25 PROCEDURE — 3078F DIAST BP <80 MM HG: CPT | Mod: CPTII,S$GLB,, | Performed by: STUDENT IN AN ORGANIZED HEALTH CARE EDUCATION/TRAINING PROGRAM

## 2023-05-25 PROCEDURE — 82728 ASSAY OF FERRITIN: CPT | Performed by: STUDENT IN AN ORGANIZED HEALTH CARE EDUCATION/TRAINING PROGRAM

## 2023-05-25 PROCEDURE — 80053 COMPREHEN METABOLIC PANEL: CPT | Performed by: STUDENT IN AN ORGANIZED HEALTH CARE EDUCATION/TRAINING PROGRAM

## 2023-05-25 PROCEDURE — 84443 ASSAY THYROID STIM HORMONE: CPT | Performed by: STUDENT IN AN ORGANIZED HEALTH CARE EDUCATION/TRAINING PROGRAM

## 2023-05-25 NOTE — PROGRESS NOTES
[facility] Note    Subjective:    The HPI and pertinent ROS is included in the Diagnostic Impression Remarks section at the end of the note. Please see below for further details. Chief complaint is at end of note.     Shira is a pleasant intelligent patient who is here for evaluation.     Modified Medications    No medications on file       Data reviewed 274}  Previous medical records reviewed and summarized in plan section at end of note.      If you are due for any health screening(s) below please notify me so we can arrange them to be ordered and scheduled. Most healthy patients at your age complete them, but you are free to accept or refuse. If you can't do it, I'll definitely understand. If you can, I'd certainly appreciate it!     All of your core healthy metrics are met.      The following portions of the patient's history were reviewed and updated as appropriate: allergies, current medications, past family history, past medical history, past social history, past surgical history and problem list.    She  has a past medical history of Abnormal Pap smear of cervix (2010), GERD (gastroesophageal reflux disease), and Vocal nodules in adults.  She  has a past surgical history that includes Elmer tooth extraction.    She  reports that she has never smoked. She has never been exposed to tobacco smoke. She has never used smokeless tobacco. She reports current alcohol use. She reports that she does not use drugs.  She family history includes Breast cancer in her maternal aunt and maternal grandmother; Cancer in her father; Colon cancer (age of onset: 41) in her father; Coronary artery disease in her father and paternal aunt; Diabetes in her father and paternal aunt; Diverticulitis in her mother; Heart attacks under age 50 in her paternal aunt; Hypertension in her father; Psoriasis in her father.    Review of patient's allergies indicates:  No Known Allergies    Tobacco Use: Low Risk     Smoking  "Tobacco Use: Never    Smokeless Tobacco Use: Never    Passive Exposure: Never     Physical Examination  General appearance: alert, cooperative, no distress  Neck: no thyromegaly, no neck stiffness  Lungs: clear to auscultation, no wheezes, rales or rhonchi, symmetric air entry  Heart: normal rate, regular rhythm, normal S1, S2, no murmurs, rubs, clicks or gallops  Abdomen: soft, nontender, nondistended, no rigidity, rebound, or guarding.   Back: no point tenderness over spine  Extremities: peripheral pulses normal, no unilateral leg swelling or calf tenderness   Neurological:alert, oriented, normal speech, no new focal findings or movement disorder noted from baseline    BP Readings from Last 3 Encounters:   05/25/23 110/70   12/16/22 116/62   10/05/22 128/73     Wt Readings from Last 3 Encounters:   05/25/23 69 kg (152 lb 1.9 oz)   12/16/22 71.3 kg (157 lb 3 oz)   10/05/22 69.1 kg (152 lb 5.4 oz)     /70 (BP Location: Right arm, Patient Position: Sitting, BP Method: Large (Manual))   Pulse 70   Temp 98.4 °F (36.9 °C) (Oral)   Resp 16   Ht 5' 4" (1.626 m)   Wt 69 kg (152 lb 1.9 oz)   LMP 05/19/2023 (Exact Date)   SpO2 99%   BMI 26.11 kg/m²    274}  Laboratory: I have reviewed old labs below:    274}    Lab Results   Component Value Date    WBC 6.53 02/14/2022    HGB 13.9 02/14/2022    HCT 40.2 02/14/2022    MCV 95 02/14/2022     02/14/2022     02/14/2022    K 4.1 02/14/2022     02/14/2022    CALCIUM 10.1 02/14/2022    CO2 24 02/14/2022    GLU 74 02/14/2022    BUN 11 02/14/2022    CREATININE 0.8 02/14/2022    ANIONGAP 12 02/14/2022    PROT 7.2 02/14/2022    ALBUMIN 4.4 02/14/2022    BILITOT 1.0 02/14/2022    ALKPHOS 58 02/14/2022    ALT 18 02/14/2022    AST 15 02/14/2022    CHOL 199 02/14/2022    TRIG 104 02/14/2022    HDL 61 02/14/2022    LDLCALC 117.2 02/14/2022    TSH 1.435 02/14/2022    HGBA1C 4.9 02/14/2022     Lab reviewed by me: Particular labs of significance that I will " "monitor, workup, or treat to improve are mentioned below in diagnostic impression remarks.    Imaging/EKG: I have reviewed the pertinent results and my findings are noted in remarks.  274}    CC:   Chief Complaint   Patient presents with    Annual Exam        274}    Assessment/Plan  Shira Ashton is a 32 y.o. female who presents to clinic with:  1. Encounter for preventive health examination    2. B12 deficiency    3. Colon cancer screening    4. Fatigue, unspecified type       274}  Diagnostic Impression Remarks + HPI     Documentation entered by me for this encounter may have been done in part using speech-recognition technology. Although I have made an effort to ensure accuracy, "sound like" errors may exist and should be interpreted in context.     Preventive-will get blood work and follow-up on this overall she has been working on eating a very healthy diet with low processed foods and avoiding any Fillers.  Also recommend colon cancer screenings very important in her father had colon cancer at 41 need to have colon cancer screening 10 years earlier  B12 deficiency-control uncertain will recheck B12 levels is not taking any supplement has some fatigue and memory issues could benefit from   Fatigue-will check b12 and iron       This is the extent of this pleasant patient's concerns at this present time. She did not feel chest pain upon exertion, dyspnea, nausea, vomiting, diaphoresis, or syncope. No pleuritic chest pain, unilateral leg swelling, calf tenderness, or calf pain. Negative for unintentional weight loss night sweats and fevers. Shira will return to clinic in a few months for further workup and reassessment or sooner as needed. She was instructed to call the clinic or go to the emergency department or urgent care immediately if her symptoms do not improve, worsens, or if any new symptoms develop. As we discussed that symptoms could worsen over the next 24 hours she was advised that if any increased " swelling, pain, or numbness arise to go immediately to the ED. Patient knows to call any time if an emergency arises. Shared decision making occurred and she verbalized understanding in agreement with this plan. I discussed imaging findings, diagnosis, possibilities, treatment options, medications, risks, and benefits. She had many questions regarding the options and long-term effects. All questions were answered. She expressed understanding after counseling regarding the diagnosis and recommendations. She was capable and demonstrated competence with understanding of these options. Shared decision making was performed resulting in her choosing the current treatment plan. Patient handout was given with instructions and recommendations. Advised the patient that if they become pregnant to alert us immediately to assess for medication changes. I also discussed the importance of close follow up to discuss labs, change or modify her medications if needed, monitor side effects, and further evaluation of medical problems.     Additional workup planned: see labs ordered below.    See below for labs and meds ordered with associated diagnosis      1. Encounter for preventive health examination  - CBC Auto Differential; Future  - Comprehensive Metabolic Panel; Future  - Hemoglobin A1C; Future  - TSH; Future  - Lipid Panel; Future  - Ferritin; Future    2. B12 deficiency  - Vitamin B12 Deficiency Panel; Future    3. Colon cancer screening  - Case Request Endoscopy: COLONOSCOPY    4. Fatigue, unspecified type      Ronn De Oliveira MD   274}    If you are due for any health screening(s) below please notify me so we can arrange them to be ordered and scheduled. Most healthy patients at your age complete them, but you are free to accept or refuse.     If you can't do it, I'll definitely understand. If you can, I'd certainly appreciate it!   All of your core healthy metrics are met.

## 2023-05-26 ENCOUNTER — PATIENT MESSAGE (OUTPATIENT)
Dept: OBSTETRICS AND GYNECOLOGY | Facility: CLINIC | Age: 33
End: 2023-05-26
Payer: COMMERCIAL

## 2023-05-26 ENCOUNTER — PATIENT MESSAGE (OUTPATIENT)
Dept: GASTROENTEROLOGY | Facility: CLINIC | Age: 33
End: 2023-05-26
Payer: COMMERCIAL

## 2023-05-26 LAB
ALBUMIN SERPL BCP-MCNC: 4.3 G/DL (ref 3.5–5.2)
ALP SERPL-CCNC: 54 U/L (ref 55–135)
ALT SERPL W/O P-5'-P-CCNC: 11 U/L (ref 10–44)
ANION GAP SERPL CALC-SCNC: 9 MMOL/L (ref 8–16)
AST SERPL-CCNC: 15 U/L (ref 10–40)
BASOPHILS # BLD AUTO: 0.05 K/UL (ref 0–0.2)
BASOPHILS NFR BLD: 0.9 % (ref 0–1.9)
BILIRUB SERPL-MCNC: 1.1 MG/DL (ref 0.1–1)
BUN SERPL-MCNC: 8 MG/DL (ref 6–20)
CALCIUM SERPL-MCNC: 9.6 MG/DL (ref 8.7–10.5)
CHLORIDE SERPL-SCNC: 105 MMOL/L (ref 95–110)
CHOLEST SERPL-MCNC: 192 MG/DL (ref 120–199)
CHOLEST/HDLC SERPL: 3.8 {RATIO} (ref 2–5)
CO2 SERPL-SCNC: 25 MMOL/L (ref 23–29)
CREAT SERPL-MCNC: 0.8 MG/DL (ref 0.5–1.4)
DIFFERENTIAL METHOD: ABNORMAL
EOSINOPHIL # BLD AUTO: 0 K/UL (ref 0–0.5)
EOSINOPHIL NFR BLD: 0.7 % (ref 0–8)
ERYTHROCYTE [DISTWIDTH] IN BLOOD BY AUTOMATED COUNT: 11.3 % (ref 11.5–14.5)
EST. GFR  (NO RACE VARIABLE): >60 ML/MIN/1.73 M^2
ESTIMATED AVG GLUCOSE: 91 MG/DL (ref 68–131)
FERRITIN SERPL-MCNC: 95 NG/ML (ref 20–300)
GLUCOSE SERPL-MCNC: 76 MG/DL (ref 70–110)
HBA1C MFR BLD: 4.8 % (ref 4–5.6)
HCT VFR BLD AUTO: 41.1 % (ref 37–48.5)
HDLC SERPL-MCNC: 51 MG/DL (ref 40–75)
HDLC SERPL: 26.6 % (ref 20–50)
HGB BLD-MCNC: 13.6 G/DL (ref 12–16)
IMM GRANULOCYTES # BLD AUTO: 0.01 K/UL (ref 0–0.04)
IMM GRANULOCYTES NFR BLD AUTO: 0.2 % (ref 0–0.5)
LDLC SERPL CALC-MCNC: 119.4 MG/DL (ref 63–159)
LYMPHOCYTES # BLD AUTO: 1.8 K/UL (ref 1–4.8)
LYMPHOCYTES NFR BLD: 31.5 % (ref 18–48)
MCH RBC QN AUTO: 32.9 PG (ref 27–31)
MCHC RBC AUTO-ENTMCNC: 33.1 G/DL (ref 32–36)
MCV RBC AUTO: 100 FL (ref 82–98)
MONOCYTES # BLD AUTO: 0.5 K/UL (ref 0.3–1)
MONOCYTES NFR BLD: 7.8 % (ref 4–15)
NEUTROPHILS # BLD AUTO: 3.4 K/UL (ref 1.8–7.7)
NEUTROPHILS NFR BLD: 58.9 % (ref 38–73)
NONHDLC SERPL-MCNC: 141 MG/DL
NRBC BLD-RTO: 0 /100 WBC
PLATELET # BLD AUTO: 290 K/UL (ref 150–450)
PMV BLD AUTO: 10.2 FL (ref 9.2–12.9)
POTASSIUM SERPL-SCNC: 3.7 MMOL/L (ref 3.5–5.1)
PROT SERPL-MCNC: 7 G/DL (ref 6–8.4)
RBC # BLD AUTO: 4.13 M/UL (ref 4–5.4)
SODIUM SERPL-SCNC: 139 MMOL/L (ref 136–145)
TRIGL SERPL-MCNC: 108 MG/DL (ref 30–150)
TSH SERPL DL<=0.005 MIU/L-ACNC: 1.79 UIU/ML (ref 0.4–4)
WBC # BLD AUTO: 5.78 K/UL (ref 3.9–12.7)

## 2023-05-30 ENCOUNTER — PATIENT MESSAGE (OUTPATIENT)
Dept: FAMILY MEDICINE | Facility: CLINIC | Age: 33
End: 2023-05-30
Payer: COMMERCIAL

## 2023-05-30 ENCOUNTER — PATIENT MESSAGE (OUTPATIENT)
Dept: GASTROENTEROLOGY | Facility: CLINIC | Age: 33
End: 2023-05-30
Payer: COMMERCIAL

## 2023-05-30 LAB — VIT B12 SERPL-MCNC: 185 NG/L (ref 180–914)

## 2023-06-01 LAB — METHYLMALONATE SERPL-SCNC: 0.15 NMOL/ML

## 2023-06-06 ENCOUNTER — OFFICE VISIT (OUTPATIENT)
Dept: OBSTETRICS AND GYNECOLOGY | Facility: CLINIC | Age: 33
End: 2023-06-06
Payer: COMMERCIAL

## 2023-06-06 VITALS — DIASTOLIC BLOOD PRESSURE: 81 MMHG | SYSTOLIC BLOOD PRESSURE: 139 MMHG | BODY MASS INDEX: 25.92 KG/M2 | WEIGHT: 151 LBS

## 2023-06-06 DIAGNOSIS — Z30.9 ENCOUNTER FOR CONTRACEPTIVE MANAGEMENT, UNSPECIFIED TYPE: ICD-10-CM

## 2023-06-06 DIAGNOSIS — Z01.419 WELL WOMAN EXAM WITH ROUTINE GYNECOLOGICAL EXAM: Primary | ICD-10-CM

## 2023-06-06 PROCEDURE — 87624 HPV HI-RISK TYP POOLED RSLT: CPT | Performed by: OBSTETRICS & GYNECOLOGY

## 2023-06-06 PROCEDURE — 3008F BODY MASS INDEX DOCD: CPT | Mod: CPTII,S$GLB,, | Performed by: OBSTETRICS & GYNECOLOGY

## 2023-06-06 PROCEDURE — 99999 PR PBB SHADOW E&M-EST. PATIENT-LVL III: ICD-10-PCS | Mod: PBBFAC,,, | Performed by: OBSTETRICS & GYNECOLOGY

## 2023-06-06 PROCEDURE — 88175 CYTOPATH C/V AUTO FLUID REDO: CPT | Performed by: OBSTETRICS & GYNECOLOGY

## 2023-06-06 PROCEDURE — 3079F DIAST BP 80-89 MM HG: CPT | Mod: CPTII,S$GLB,, | Performed by: OBSTETRICS & GYNECOLOGY

## 2023-06-06 PROCEDURE — 58301 REMOVAL OF IUD: ICD-10-PCS | Mod: S$GLB,,, | Performed by: OBSTETRICS & GYNECOLOGY

## 2023-06-06 PROCEDURE — 3044F HG A1C LEVEL LT 7.0%: CPT | Mod: CPTII,S$GLB,, | Performed by: OBSTETRICS & GYNECOLOGY

## 2023-06-06 PROCEDURE — 99395 PR PREVENTIVE VISIT,EST,18-39: ICD-10-PCS | Mod: 25,S$GLB,, | Performed by: OBSTETRICS & GYNECOLOGY

## 2023-06-06 PROCEDURE — 3079F PR MOST RECENT DIASTOLIC BLOOD PRESSURE 80-89 MM HG: ICD-10-PCS | Mod: CPTII,S$GLB,, | Performed by: OBSTETRICS & GYNECOLOGY

## 2023-06-06 PROCEDURE — 99395 PREV VISIT EST AGE 18-39: CPT | Mod: 25,S$GLB,, | Performed by: OBSTETRICS & GYNECOLOGY

## 2023-06-06 PROCEDURE — 3044F PR MOST RECENT HEMOGLOBIN A1C LEVEL <7.0%: ICD-10-PCS | Mod: CPTII,S$GLB,, | Performed by: OBSTETRICS & GYNECOLOGY

## 2023-06-06 PROCEDURE — 3075F SYST BP GE 130 - 139MM HG: CPT | Mod: CPTII,S$GLB,, | Performed by: OBSTETRICS & GYNECOLOGY

## 2023-06-06 PROCEDURE — 3075F PR MOST RECENT SYSTOLIC BLOOD PRESS GE 130-139MM HG: ICD-10-PCS | Mod: CPTII,S$GLB,, | Performed by: OBSTETRICS & GYNECOLOGY

## 2023-06-06 PROCEDURE — 1159F PR MEDICATION LIST DOCUMENTED IN MEDICAL RECORD: ICD-10-PCS | Mod: CPTII,S$GLB,, | Performed by: OBSTETRICS & GYNECOLOGY

## 2023-06-06 PROCEDURE — 99999 PR PBB SHADOW E&M-EST. PATIENT-LVL III: CPT | Mod: PBBFAC,,, | Performed by: OBSTETRICS & GYNECOLOGY

## 2023-06-06 PROCEDURE — 58301 REMOVE INTRAUTERINE DEVICE: CPT | Mod: S$GLB,,, | Performed by: OBSTETRICS & GYNECOLOGY

## 2023-06-06 PROCEDURE — 3008F PR BODY MASS INDEX (BMI) DOCUMENTED: ICD-10-PCS | Mod: CPTII,S$GLB,, | Performed by: OBSTETRICS & GYNECOLOGY

## 2023-06-06 PROCEDURE — 1159F MED LIST DOCD IN RCRD: CPT | Mod: CPTII,S$GLB,, | Performed by: OBSTETRICS & GYNECOLOGY

## 2023-06-06 RX ORDER — LACTIC ACID, L-, CITRIC ACID MONOHYDRATE, AND POTASSIUM BITARTRATE 90; 50; 20 MG/5G; MG/5G; MG/5G
1 GEL VAGINAL
Qty: 60 G | Refills: 6 | Status: SHIPPED | OUTPATIENT
Start: 2023-06-06

## 2023-06-06 NOTE — PROGRESS NOTES
History & Physical  Gynecology      SUBJECTIVE:     Chief Complaint: Well Woman       History of Present Illness:  Annual Exam-Premenopausal  Patient presents for annual exam. The patient has no complaints today other than some spotting after intercourse.  Menstrual cycles are absent- but has symptoms of PMS and back pain with ovulation.  The patient is sexually active, considering pregnancy soon. GYN screening history: 2022: MARGARITA 3, neg ECC, CIN1 at margin-- plan for pap/cotest at 6 months. The patient participates in regular exercise: yes.  Smoking status:  no    Contraception: IUD- patient is having discomfort with IUD and bleeding after sex.  Desires removal.    FH:  Breast cancer: mat gm, mat aunt  Colon cancer: father age 41; recently  with covid age 70s;   Ovarian cancer: neg    Review of patient's allergies indicates:  No Known Allergies    Past Medical History:   Diagnosis Date    Abnormal Pap smear of cervix     GERD (gastroesophageal reflux disease)     Vocal nodules in adults      Past Surgical History:   Procedure Laterality Date    WISDOM TOOTH EXTRACTION       OB History          0    Para   0    Term   0       0    AB   0    Living   0         SAB   0    IAB   0    Ectopic   0    Multiple   0    Live Births   0               Family History   Problem Relation Age of Onset    Breast cancer Maternal Grandmother     Colon cancer Father 41    Diabetes Father     Hypertension Father     Coronary artery disease Father         bypass    Psoriasis Father         psoriatic arthritis     Cancer Father     Breast cancer Maternal Aunt     Diabetes Paternal Aunt     Coronary artery disease Paternal Aunt     Heart attacks under age 50 Paternal Aunt     Diverticulitis Mother     Eclampsia Neg Hx     Stroke Neg Hx     Miscarriages / Stillbirths Neg Hx     Ovarian cancer Neg Hx      labor Neg Hx      Social History     Tobacco Use    Smoking status: Never     Passive exposure: Never     Smokeless tobacco: Never   Substance Use Topics    Alcohol use: Yes    Drug use: No       Current Outpatient Medications   Medication Sig    levonorgestrel (MIRENA) 20 mcg/24 hours (5 yrs) 52 mg IUD 1 Intra Uterine Device by Intrauterine route once. for 1 dose     Current Facility-Administered Medications   Medication    levonorgestrel 20 mcg/24 hours (5 yrs) 52 mg IUD 1 Intra Uterine Device         Review of Systems:  Review of Systems   Constitutional:  Negative for appetite change, fever and unexpected weight change.   Respiratory:  Negative for shortness of breath.    Cardiovascular:  Negative for chest pain.   Gastrointestinal:  Negative for nausea and vomiting.   Genitourinary:  Negative for menorrhagia, menstrual problem, pelvic pain, vaginal bleeding, vaginal discharge and vaginal pain.   Integumentary:  Negative for breast mass.   Breast: Negative for lump and mass     OBJECTIVE:     Physical Exam:  Physical Exam  Vitals and nursing note reviewed.   Constitutional:       Appearance: She is well-developed.   Neck:      Thyroid: No thyromegaly.      Trachea: No tracheal deviation.   Cardiovascular:      Rate and Rhythm: Normal rate and regular rhythm.      Heart sounds: Normal heart sounds.   Pulmonary:      Effort: Pulmonary effort is normal.      Breath sounds: Normal breath sounds.   Chest:   Breasts:     Breasts are symmetrical.      Right: No inverted nipple, mass, nipple discharge, skin change or tenderness.      Left: No inverted nipple, mass, nipple discharge, skin change or tenderness.   Abdominal:      Palpations: Abdomen is soft.   Genitourinary:     General: Normal vulva.      Labia:         Right: No rash, tenderness, lesion or injury.         Left: No rash, tenderness, lesion or injury.       Urethra: No prolapse, urethral pain, urethral swelling or urethral lesion.      Vagina: Normal. No signs of injury and foreign body. No vaginal discharge, erythema, tenderness or bleeding.      Cervix:  No cervical motion tenderness, discharge or friability.      Uterus: Not deviated, not enlarged, not fixed and not tender.       Adnexa:         Right: No mass, tenderness or fullness.          Left: No mass, tenderness or fullness.        Rectum: No anal fissure or external hemorrhoid.      Comments: Urethral meatus: normal size, anterior vaginal wall with no prolapse, no lesions  Bladder: no fullness, masses or tenderness  Musculoskeletal:      Cervical back: Normal range of motion and neck supple.   Neurological:      Mental Status: She is alert and oriented to person, place, and time.   Psychiatric:         Behavior: Behavior normal.         Thought Content: Thought content normal.         Judgment: Judgment normal.       Chaperoned by: Tiffani    ASSESSMENT:       ICD-10-CM ICD-9-CM    1. Well woman exam with routine gynecological exam  Z01.419 V72.31 Liquid-Based Pap Smear, Screening      HPV High Risk Genotypes, PCR             Plan:      Shira was seen today for well woman.    Diagnoses and all orders for this visit:    Well woman exam with routine gynecological exam  -     Liquid-Based Pap Smear, Screening  -     HPV High Risk Genotypes, PCR        Orders Placed This Encounter   Procedures    HPV High Risk Genotypes, PCR       Well Woman:  - Pap smear and hpv today  - Birth control: iud removed today- see procedure note; phexxi sent to pharmacy  - GC/CT:n/a  - Mammogram: n/a  - Smoking cessation: n/a  - Labs: none required   - Vaccines: s/p gardasil  - Exercise counseling  - has colonoscopy scheduled    Follow up in  one year for annual or prn.    Kayce Fong

## 2023-06-07 NOTE — PROCEDURES
Removal of IUD    Date/Time: 6/6/2023 3:15 PM  Performed by: Kayce Fong MD  Authorized by: Kayce Fong MD     Consent obtained:  Written  Consent given by:  Patient  Procedure risks and benefits discussed: yes    Patient questions answered: yes    Patient agrees, verbalizes understanding, and wants to proceed: yes    Implant grasped by: IUD hook and forceps  Removal due to infection and inflammatory reaction: no    Other reason for removal:  Discomfort, post coital bleeding  Removal due to mechanical complications of IUD/Nexplanon: no    Removed with no complications: yes      Kayce Fong

## 2023-06-08 ENCOUNTER — TELEPHONE (OUTPATIENT)
Dept: PHARMACY | Facility: CLINIC | Age: 33
End: 2023-06-08
Payer: COMMERCIAL

## 2023-06-12 LAB
HPV HR 12 DNA SPEC QL NAA+PROBE: NEGATIVE
HPV16 AG SPEC QL: NEGATIVE
HPV18 DNA SPEC QL NAA+PROBE: NEGATIVE

## 2023-06-13 LAB
FINAL PATHOLOGIC DIAGNOSIS: NORMAL
Lab: NORMAL

## 2023-08-08 ENCOUNTER — PATIENT MESSAGE (OUTPATIENT)
Dept: ENDOSCOPY | Facility: HOSPITAL | Age: 33
End: 2023-08-08

## 2023-08-08 ENCOUNTER — TELEPHONE (OUTPATIENT)
Dept: GASTROENTEROLOGY | Facility: CLINIC | Age: 33
End: 2023-08-08
Payer: COMMERCIAL

## 2023-08-08 NOTE — TELEPHONE ENCOUNTER
----- Message from Marian Green sent at 8/8/2023 10:40 AM CDT -----  Contact: pt  Type: Needs Medical Advice  Who Called:  pt  Best Call Back Number: [phone]    Additional Information: Pt is calling the office to see how the billing was going she spoke with billing and billing told her the only person who can answer the question is the  Herself.Please call back and advise.

## 2023-08-08 NOTE — TELEPHONE ENCOUNTER
Call placed to Ms. Ashton in regards to message received. She stated that her colonoscopy is coded wrong, as she spoke with her insurance and they said since she has never had once before it should be coded as a wellness. I informed her that it is coded as a high risk screening since she has a family history of colon cancer. I informed her I was unable to code it as a regular screening as screening age is 45, but due to her family history she has to have them sooner.  I informed her that I am unable to change the coding on this due to her being under the age of 46. I advised I would check to make sure I am telling her correctly and get back with her. She verbalized understanding. No further issues noted.

## 2023-08-09 ENCOUNTER — PATIENT MESSAGE (OUTPATIENT)
Dept: ENDOSCOPY | Facility: HOSPITAL | Age: 33
End: 2023-08-09

## 2023-08-11 ENCOUNTER — ANESTHESIA (OUTPATIENT)
Dept: ENDOSCOPY | Facility: HOSPITAL | Age: 33
End: 2023-08-11
Payer: COMMERCIAL

## 2023-08-11 ENCOUNTER — ANESTHESIA EVENT (OUTPATIENT)
Dept: ENDOSCOPY | Facility: HOSPITAL | Age: 33
End: 2023-08-11
Payer: COMMERCIAL

## 2023-08-11 ENCOUNTER — HOSPITAL ENCOUNTER (OUTPATIENT)
Facility: HOSPITAL | Age: 33
Discharge: HOME | End: 2023-08-11
Attending: INTERNAL MEDICINE | Admitting: INTERNAL MEDICINE
Payer: COMMERCIAL

## 2023-08-11 DIAGNOSIS — Z80.0 FAMILY HISTORY OF COLON CANCER: ICD-10-CM

## 2023-08-11 PROCEDURE — 25000003 PHARM REV CODE 250: Performed by: INTERNAL MEDICINE

## 2023-08-11 PROCEDURE — 37000008 HC ANESTHESIA 1ST 15 MINUTES: Performed by: INTERNAL MEDICINE

## 2023-08-11 PROCEDURE — G0105 COLORECTAL SCRN; HI RISK IND: HCPCS | Mod: ,,, | Performed by: INTERNAL MEDICINE

## 2023-08-11 PROCEDURE — D9220A PRA ANESTHESIA: Mod: ANES,,, | Performed by: ANESTHESIOLOGY

## 2023-08-11 PROCEDURE — D9220A PRA ANESTHESIA: ICD-10-PCS | Mod: CRNA,,, | Performed by: STUDENT IN AN ORGANIZED HEALTH CARE EDUCATION/TRAINING PROGRAM

## 2023-08-11 PROCEDURE — 37000009 HC ANESTHESIA EA ADD 15 MINS: Performed by: INTERNAL MEDICINE

## 2023-08-11 PROCEDURE — D9220A PRA ANESTHESIA: ICD-10-PCS | Mod: ANES,,, | Performed by: ANESTHESIOLOGY

## 2023-08-11 PROCEDURE — 25000003 PHARM REV CODE 250: Performed by: STUDENT IN AN ORGANIZED HEALTH CARE EDUCATION/TRAINING PROGRAM

## 2023-08-11 PROCEDURE — G0105 COLORECTAL SCRN; HI RISK IND: HCPCS | Performed by: INTERNAL MEDICINE

## 2023-08-11 PROCEDURE — D9220A PRA ANESTHESIA: Mod: CRNA,,, | Performed by: STUDENT IN AN ORGANIZED HEALTH CARE EDUCATION/TRAINING PROGRAM

## 2023-08-11 PROCEDURE — 63600175 PHARM REV CODE 636 W HCPCS: Performed by: STUDENT IN AN ORGANIZED HEALTH CARE EDUCATION/TRAINING PROGRAM

## 2023-08-11 PROCEDURE — G0105 COLORECTAL SCRN; HI RISK IND: ICD-10-PCS | Mod: ,,, | Performed by: INTERNAL MEDICINE

## 2023-08-11 RX ORDER — LIDOCAINE HYDROCHLORIDE 20 MG/ML
INJECTION INTRAVENOUS
Status: DISCONTINUED | OUTPATIENT
Start: 2023-08-11 | End: 2023-08-11

## 2023-08-11 RX ORDER — PROPOFOL 10 MG/ML
VIAL (ML) INTRAVENOUS
Status: DISCONTINUED | OUTPATIENT
Start: 2023-08-11 | End: 2023-08-11

## 2023-08-11 RX ORDER — SODIUM CHLORIDE 9 MG/ML
INJECTION, SOLUTION INTRAVENOUS CONTINUOUS
Status: DISCONTINUED | OUTPATIENT
Start: 2023-08-11 | End: 2023-08-11 | Stop reason: HOSPADM

## 2023-08-11 RX ADMIN — PROPOFOL 100 MG: 10 INJECTION, EMULSION INTRAVENOUS at 02:08

## 2023-08-11 RX ADMIN — SODIUM CHLORIDE: 9 INJECTION, SOLUTION INTRAVENOUS at 01:08

## 2023-08-11 RX ADMIN — PROPOFOL 30 MG: 10 INJECTION, EMULSION INTRAVENOUS at 02:08

## 2023-08-11 RX ADMIN — LIDOCAINE HYDROCHLORIDE 100 MG: 20 INJECTION, SOLUTION INTRAVENOUS at 02:08

## 2023-08-11 RX ADMIN — PROPOFOL 20 MG: 10 INJECTION, EMULSION INTRAVENOUS at 02:08

## 2023-08-11 NOTE — TRANSFER OF CARE
"Anesthesia Transfer of Care Note    Patient: Shira Ashton    Procedure(s) Performed: Procedure(s) (LRB):  COLONOSCOPY (N/A)    Patient location: GI    Anesthesia Type: general    Transport from OR: Transported from OR on room air with adequate spontaneous ventilation    Post pain: adequate analgesia    Post assessment: no apparent anesthetic complications and tolerated procedure well    Post vital signs: stable    Level of consciousness: awake    Nausea/Vomiting: no nausea/vomiting    Complications: none    Transfer of care protocol was followed      Last vitals:   Visit Vitals  /77 (BP Location: Left arm, Patient Position: Lying)   Pulse 79   Temp 36.8 °C (98.2 °F) (Skin)   Resp 16   Ht 5' 4" (1.626 m)   Wt 68.9 kg (152 lb)   SpO2 98%   Breastfeeding No   BMI 26.09 kg/m²     "

## 2023-08-11 NOTE — ANESTHESIA POSTPROCEDURE EVALUATION
Anesthesia Post Evaluation    Patient: Shira Ashton    Procedure(s) Performed: Procedure(s) (LRB):  COLONOSCOPY (N/A)    Final Anesthesia Type: general      Patient location during evaluation: PACU  Patient participation: Yes- Able to Participate  Level of consciousness: awake and alert  Post-procedure vital signs: reviewed and stable  Pain management: adequate  Airway patency: patent    PONV status at discharge: No PONV  Anesthetic complications: no      Cardiovascular status: hemodynamically stable  Respiratory status: unassisted and room air  Hydration status: euvolemic  Follow-up not needed.          Vitals Value Taken Time   /62 08/11/23 1450   Temp 36.7 °C (98.1 °F) 08/11/23 1440   Pulse 60 08/11/23 1450   Resp 14 08/11/23 1450   SpO2 100 % 08/11/23 1450         Event Time   Out of Recovery 14:57:25         Pain/Ml Score: Ml Score: 10 (8/11/2023  2:50 PM)

## 2023-08-11 NOTE — ANESTHESIA PREPROCEDURE EVALUATION
08/11/2023  Shira Ashton is a 32 y.o., female.      Pre-op Assessment    I have reviewed the Patient Summary Reports.     I have reviewed the Nursing Notes. I have reviewed the NPO Status.   I have reviewed the Medications.     Review of Systems  Anesthesia Hx:  Denies Family Hx of Anesthesia complications.   Denies Personal Hx of Anesthesia complications.   Hepatic/GI:   Bowel Prep. GERD        Physical Exam  General: Well nourished, Cooperative, Alert, Oriented and Anxious    Airway:  Mallampati: II   Mouth Opening: Normal  TM Distance: Normal  Tongue: Normal  Neck ROM: Normal ROM        Anesthesia Plan  Type of Anesthesia, risks & benefits discussed:    Anesthesia Type: Gen Natural Airway  Intra-op Monitoring Plan: Standard ASA Monitors  Induction:  IV  Informed Consent: Informed consent signed with the Patient and all parties understand the risks and agree with anesthesia plan.  All questions answered.   ASA Score: 2    Ready For Surgery From Anesthesia Perspective.     .

## 2023-08-11 NOTE — H&P
[facility]    CC: Family history of colon cancer    HPI 32 y.o. female presents for screening colonoscopy due to family history of colon cancer in her father diagnosed at the age of 41    Past Medical History:   Diagnosis Date    Abnormal Pap smear of cervix 2010    GERD (gastroesophageal reflux disease)     Vocal nodules in adults        Allergies and Medications reviewed     Review of Systems  General ROS: negative for - chills, fever or weight loss  Cardiovascular ROS: no chest pain or dyspnea on exertion  Gastrointestinal ROS: no blood in stool    Physical Examination  There were no vitals taken for this visit.  General appearance: alert, cooperative, no distress  HENT: Normocephalic, atraumatic, neck symmetrical, no nasal discharge, sclera anicteric   Lungs: clear to auscultation bilaterally, symmetric chest wall expansion bilaterally  Heart: regular rate and rhythm without rub; no displacement of the PMI   Abdomen: soft  Extremities: extremities symmetric; no clubbing, cyanosis, or edema        Labs:  Lab Results   Component Value Date    WBC 5.78 05/25/2023    HGB 13.6 05/25/2023    HCT 41.1 05/25/2023     (H) 05/25/2023     05/25/2023       Assessment:   32 y.o. female presents for colonoscopy     Plan:  -proceed to colonoscopy     Melissa Cabrera MD  [facility]  [address], [address]  [place] [address]  Office: [phone]  Fax: [phone]

## 2023-08-11 NOTE — PROVATION PATIENT INSTRUCTIONS
Continue your current home medications  Check your blood pressure no more than 2 times a day, morning and evening.  Follow-up with your PMD in 2 to 4 days for recheck  Your family nurse practitioner will give you a call later this afternoon in regards to increasing your Coreg dose.  She is waiting to hear back from cardiology at this time.  Return to the ER for chest pain, shortness of breath, headache, confusion, weakness in extremities or any new or worsening concerns     Discharge Summary/Instructions after an Endoscopic Procedure  Patient Name: Shira Ashton  Patient MRN: [id number]  Patient YOB: 1990 Friday, August 11, 2023  Melissa Cabrera MD  Dear patient,  As a result of recent federal legislation (The Federal Cures Act), you may   receive lab or pathology results from your procedure in your [facility]   account before your physician is able to contact you. Your physician or   their representative will relay the results to you with their   recommendations at their soonest availability.  Thank you,  RESTRICTIONS:  During your procedure today, you received medications for sedation.  These   medications may affect your judgment, balance and coordination.  Therefore,   for 24 hours, you have the following restrictions:   - DO NOT drive a car, operate machinery, make legal/financial decisions,   sign important papers or drink alcohol.    ACTIVITY:  Today: no heavy lifting, straining or running due to procedural   sedation/anesthesia.  The following day: return to full activity including work.  DIET:  Eat and drink normally unless instructed otherwise.     TREATMENT FOR COMMON SIDE EFFECTS:  - Mild abdominal pain, nausea, belching, bloating or excessive gas:  rest,   eat lightly and use a heating pad.  - Sore Throat: treat with throat lozenges and/or gargle with warm salt   water.  - Because air was used during the procedure, expelling large amounts of air   from your rectum or belching is normal.  - If a bowel prep was taken, you may not have a bowel movement for 1-3 days.    This is normal.  SYMPTOMS TO WATCH FOR AND REPORT TO YOUR PHYSICIAN:  1. Abdominal pain or bloating, other than gas cramps.  2. Chest pain.  3. Back pain.  4. Signs of infection such as: chills or fever occurring within 24 hours   after the procedure.  5. Rectal bleeding, which would show as bright red, maroon, or black stools.   (A tablespoon of blood from the rectum is not serious,  especially if   hemorrhoids are present.)  6. Vomiting.  7. Weakness or dizziness.  GO DIRECTLY TO THE NEAREST EMERGENCY ROOM IF YOU HAVE ANY OF THE FOLLOWING:      Difficulty breathing              Chills and/or fever over 101 F   Persistent vomiting and/or vomiting blood   Severe abdominal pain   Severe chest pain   Black, tarry stools   Bleeding- more than one tablespoon   Any other symptom or condition that you feel may need urgent attention  Your doctor recommends these additional instructions:  If any biopsies were taken, your doctors clinic will contact you in 1 to 2   weeks with any results.  - Discharge patient to home (with escort).   - Patient has a contact number available for emergencies.  The signs and   symptoms of potential delayed complications were discussed with the   patient.  Return to normal activities tomorrow.  Written discharge   instructions were provided to the patient.   - Resume previous diet.   - Patient has a contact number available for emergencies.  The signs and   symptoms of potential delayed complications were discussed with the   patient.  Return to normal activities tomorrow.  Written discharge   instructions were provided to the patient.   - Resume previous diet.   - Continue present medications.   - Repeat colonoscopy in 5 years for screening purposes due to family history   of colon cancer in her father at age 41  - Return to my office PRN.  For questions, problems or results please call your physician - Melissa Cabrera MD at Work:  [phone].  [facility], EMERGENCY ROOM PHONE NUMBER: [phone]  IF A COMPLICATION OR EMERGENCY SITUATION ARISES AND YOU ARE UNABLE TO REACH   YOUR PHYSICIAN - GO DIRECTLY TO THE EMERGENCY ROOM.  Melissa Cabrera MD  8/11/2023 2:30:31 PM  This report has been verified and signed electronically.  Dear patient,  As a result of recent federal legislation (The Federal Cures Act), you may   receive lab or  pathology results from your procedure in your [facility]   account before your physician is able to contact you. Your physician or   their representative will relay the results to you with their   recommendations at their soonest availability.  Thank you,  PR[employer]ATION

## 2023-08-14 VITALS
DIASTOLIC BLOOD PRESSURE: 62 MMHG | RESPIRATION RATE: 14 BRPM | WEIGHT: 152 LBS | HEIGHT: 64 IN | BODY MASS INDEX: 25.95 KG/M2 | TEMPERATURE: 98 F | HEART RATE: 60 BPM | OXYGEN SATURATION: 100 % | SYSTOLIC BLOOD PRESSURE: 124 MMHG

## 2023-08-23 ENCOUNTER — PATIENT MESSAGE (OUTPATIENT)
Dept: FAMILY MEDICINE | Facility: CLINIC | Age: 33
End: 2023-08-23
Payer: COMMERCIAL

## 2024-02-08 ENCOUNTER — TELEPHONE (OUTPATIENT)
Dept: OBSTETRICS AND GYNECOLOGY | Facility: CLINIC | Age: 34
End: 2024-02-08
Payer: COMMERCIAL

## 2024-02-08 DIAGNOSIS — Z34.90 PREGNANCY, UNSPECIFIED GESTATIONAL AGE: Primary | ICD-10-CM

## 2024-02-26 ENCOUNTER — OFFICE VISIT (OUTPATIENT)
Dept: FAMILY MEDICINE | Facility: CLINIC | Age: 34
End: 2024-02-26
Payer: COMMERCIAL

## 2024-02-26 VITALS
OXYGEN SATURATION: 98 % | RESPIRATION RATE: 16 BRPM | WEIGHT: 145.94 LBS | DIASTOLIC BLOOD PRESSURE: 56 MMHG | SYSTOLIC BLOOD PRESSURE: 108 MMHG | HEIGHT: 64 IN | TEMPERATURE: 99 F | HEART RATE: 74 BPM | BODY MASS INDEX: 24.92 KG/M2

## 2024-02-26 DIAGNOSIS — M25.571 ACUTE RIGHT ANKLE PAIN: Primary | ICD-10-CM

## 2024-02-26 DIAGNOSIS — M25.471 RIGHT ANKLE SWELLING: ICD-10-CM

## 2024-02-26 DIAGNOSIS — S61.219A LACERATION OF FINGER WITHOUT FOREIGN BODY WITHOUT DAMAGE TO NAIL, UNSPECIFIED FINGER, UNSPECIFIED LATERALITY, INITIAL ENCOUNTER: ICD-10-CM

## 2024-02-26 PROCEDURE — 99999 PR PBB SHADOW E&M-EST. PATIENT-LVL IV: CPT | Mod: PBBFAC,,,

## 2024-02-26 PROCEDURE — 99213 OFFICE O/P EST LOW 20 MIN: CPT | Mod: S$GLB,,,

## 2024-02-26 PROCEDURE — 3074F SYST BP LT 130 MM HG: CPT | Mod: CPTII,S$GLB,,

## 2024-02-26 PROCEDURE — 3008F BODY MASS INDEX DOCD: CPT | Mod: CPTII,S$GLB,,

## 2024-02-26 PROCEDURE — 3078F DIAST BP <80 MM HG: CPT | Mod: CPTII,S$GLB,,

## 2024-02-26 NOTE — PROGRESS NOTES
Subjective:       Patient ID: Shira Ashton is a 33 y.o. female.    Chief Complaint: Ankle Pain    Patient presents to the clinic with complaint of right ankle pain. She is 8 weeks pregnant.     She was playing volleyball and rolled her right ankle on Thursday. States she did hear a pop when it happened.    Since then she has had pain, swelling, and bruising to the ankle. She has been using RICE and pain has improved. She is able to walk.     She also reports that she cut her finger with gardening reji and was not sure if she needs a Tetanus.     Has no other complaints or concerns today.     Patient educated on plan of care, verbalized understanding.         Review of Systems   Constitutional:  Negative for activity change, appetite change, chills, diaphoresis and fever.   HENT:  Negative for congestion, ear pain, postnasal drip, sinus pressure, sneezing and sore throat.    Eyes:  Negative for pain, discharge, redness and itching.   Respiratory:  Negative for apnea, cough, chest tightness, shortness of breath and wheezing.    Cardiovascular:  Negative for chest pain and leg swelling.   Gastrointestinal:  Negative for abdominal distention, abdominal pain, constipation, diarrhea, nausea and vomiting.   Genitourinary:  Negative for difficulty urinating, dysuria, flank pain and frequency.   Musculoskeletal:         Right ankle pain, swelling, and bruising   Skin:  Positive for wound. Negative for color change and rash.   Neurological:  Negative for dizziness.   All other systems reviewed and are negative.      Patient Active Problem List   Diagnosis    Family history of colon cancer in father    Family history of breast cancer    GERD (gastroesophageal reflux disease)    Vocal nodules in adults    B12 deficiency    Pregnancy       Objective:      Physical Exam  Vitals and nursing note reviewed.   Constitutional:       General: She is not in acute distress.     Appearance: Normal appearance. She is well-developed.  "  HENT:      Head: Normocephalic.      Nose: Nose normal.   Eyes:      Conjunctiva/sclera: Conjunctivae normal.      Pupils: Pupils are equal, round, and reactive to light.   Cardiovascular:      Rate and Rhythm: Normal rate and regular rhythm.      Heart sounds: Normal heart sounds.   Pulmonary:      Effort: Pulmonary effort is normal. No respiratory distress.      Breath sounds: Normal breath sounds.   Musculoskeletal:      Cervical back: Normal range of motion and neck supple.      Right ankle: Swelling and ecchymosis present. Tenderness present. Normal range of motion.        Legs:    Skin:     General: Skin is warm and dry.      Findings: No rash.      Comments: Finger laceration healing well- No s/s of infection   Neurological:      Mental Status: She is alert and oriented to person, place, and time.   Psychiatric:         Behavior: Behavior normal.         Lab Results   Component Value Date    WBC 5.05 02/27/2024    HGB 12.6 02/27/2024    HCT 36.5 (L) 02/27/2024     02/27/2024    CHOL 192 05/25/2023    TRIG 108 05/25/2023    HDL 51 05/25/2023    ALT 11 05/25/2023    AST 15 05/25/2023     05/25/2023    K 3.7 05/25/2023     05/25/2023    CREATININE 0.8 05/25/2023    BUN 8 05/25/2023    CO2 25 05/25/2023    TSH 1.793 05/25/2023    HGBA1C 4.8 05/25/2023     The ASCVD Risk score (Alessandro DK, et al., 2019) failed to calculate for the following reasons:    The 2019 ASCVD risk score is only valid for ages 40 to 79  Visit Vitals  BP (!) 108/56 (BP Location: Right arm, Patient Position: Sitting, BP Method: Small (Manual))   Pulse 74   Temp 98.8 °F (37.1 °C) (Oral)   Resp 16   Ht 5' 4" (1.626 m)   Wt 66.2 kg (145 lb 15.1 oz)   SpO2 98%   BMI 25.05 kg/m²      Assessment:       1. Acute right ankle pain    2. Laceration of finger without foreign body without damage to nail, unspecified finger, unspecified laterality, initial encounter    3. Right ankle swelling        Plan:       1. Acute right ankle " pain/Right ankle swelling  -     Cancel: X-Ray Ankle Complete Right; Future; Expected date: 02/26/2024   - Continue RICE   - Due to pregnancy will hold off on x-ray at this time   - The diagnosis, treatment plan, instructions for follow-up and reevaluation as well as ED precautions were discussed and understanding was verbalized. All questions or concerns have been addressed.     2. Laceration of finger without foreign body without damage to nail, unspecified finger, unspecified laterality, initial encounter   - Finger shows no signs or symptoms of infection   - She is going to check with her OB in regards to Tetanus   - The diagnosis, treatment plan, instructions for follow-up and reevaluation as well as ED precautions were discussed and understanding was verbalized. All questions or concerns have been addressed.        Follow up if symptoms worsen or fail to improve.      Future Appointments       Date Provider Specialty Appt Notes    5/27/2024 Ronn De Oliveira MD Family Medicine annual

## 2024-02-27 ENCOUNTER — OFFICE VISIT (OUTPATIENT)
Dept: OBSTETRICS AND GYNECOLOGY | Facility: CLINIC | Age: 34
End: 2024-02-27
Payer: COMMERCIAL

## 2024-02-27 ENCOUNTER — PATIENT MESSAGE (OUTPATIENT)
Dept: OBSTETRICS AND GYNECOLOGY | Facility: CLINIC | Age: 34
End: 2024-02-27

## 2024-02-27 ENCOUNTER — HOSPITAL ENCOUNTER (OUTPATIENT)
Dept: PERINATAL CARE | Facility: OTHER | Age: 34
Discharge: HOME | End: 2024-02-27
Payer: COMMERCIAL

## 2024-02-27 VITALS
BODY MASS INDEX: 25.25 KG/M2 | DIASTOLIC BLOOD PRESSURE: 63 MMHG | HEIGHT: 64 IN | SYSTOLIC BLOOD PRESSURE: 116 MMHG | WEIGHT: 147.94 LBS

## 2024-02-27 DIAGNOSIS — N91.2 AMENORRHEA: Primary | ICD-10-CM

## 2024-02-27 DIAGNOSIS — Z32.01 POSITIVE PREGNANCY TEST: ICD-10-CM

## 2024-02-27 DIAGNOSIS — O02.1 MISSED AB: Primary | ICD-10-CM

## 2024-02-27 DIAGNOSIS — Z34.90 PREGNANCY, UNSPECIFIED GESTATIONAL AGE: ICD-10-CM

## 2024-02-27 DIAGNOSIS — O02.1 MISSED AB: ICD-10-CM

## 2024-02-27 PROCEDURE — 76801 OB US < 14 WKS SINGLE FETUS: CPT | Mod: 26,,, | Performed by: OBSTETRICS & GYNECOLOGY

## 2024-02-27 PROCEDURE — 3078F DIAST BP <80 MM HG: CPT | Mod: CPTII,S$GLB,,

## 2024-02-27 PROCEDURE — 3074F SYST BP LT 130 MM HG: CPT | Mod: CPTII,S$GLB,,

## 2024-02-27 PROCEDURE — 76801 OB US < 14 WKS SINGLE FETUS: CPT

## 2024-02-27 PROCEDURE — 99999 PR PBB SHADOW E&M-EST. PATIENT-LVL III: CPT | Mod: PBBFAC,,,

## 2024-02-27 PROCEDURE — 99213 OFFICE O/P EST LOW 20 MIN: CPT | Mod: S$GLB,,,

## 2024-02-27 PROCEDURE — 1159F MED LIST DOCD IN RCRD: CPT | Mod: CPTII,S$GLB,,

## 2024-02-27 PROCEDURE — 1160F RVW MEDS BY RX/DR IN RCRD: CPT | Mod: CPTII,S$GLB,,

## 2024-02-27 PROCEDURE — 3008F BODY MASS INDEX DOCD: CPT | Mod: CPTII,S$GLB,,
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
## 2024-02-27 NOTE — PATIENT INSTRUCTIONS
Vinay Silva,     If you are due for any health screening(s) below please notify me so we can arrange them to be ordered and scheduled. Most healthy patients at your age complete them, but you are free to accept or refuse.     If you can't do it, I'll definitely understand. If you can, I'd certainly appreciate it!    All of your core healthy metrics are met.

## 2024-02-27 NOTE — PATIENT INSTRUCTIONS
Options For Treatment     Expectant Management: may wait up to 4 weeks to pass tissue, but alternatives should be considered in the event of signs of infection, excessive bleeding that is prolonged or signs of hemodynamic instability (feeling dizzy or light headed).                Medical Management  - Medical management for early pregnancy loss can be considered in women without infection, hemorrhage, severe anemia, or bleeding disorders who want to shorten the time to complete expulsion but prefer to avoid surgical evacuation.   - Compared with expectant management, medical management of early pregnancy loss decreases the time to expulsion and increases the rate of complete expulsion without the need for surgical intervention  - 71% success rate after 1 dose of medication and 84% after 2nd dose of medication (if needed)  - Recommend misoprostol 800 mcg per vagina, with a repeat dose in 24 hours if no results with first dose  - As with expectant management, it also is important to  patients that surgery may be needed if medical management does not achieve complete expulsion.  - Follow-up typically includes confirmation of complete expulsion by ultrasound examination, but serial serum ?-hCG measurement may be used instead in settings where ultrasonography is unavailable. Ultrasound is typically done in 7-14 days.     **You are likely to have bleeding that is heavier than menses (and potentially associated with severe cramping). You may also have nausea and diarrhea.   **If you are soaking through 2 maxi pads per hour for 2 consecutive hours, we recommend calling/coming in to be evaluated.        Surgical Management  - Many women prefer surgical evacuation to expectant or medical treatment because it provides more immediate completion of the process with less follow-up.        Manual Vacuum Aspirator  - Suction curettage also can be performed in an office setting with an electric vacuum source or manual vacuum  aspirator, under local anesthesia with or without the addition of sedation         How do the different management options for early pregnancy loss compare in effectiveness and risk of complications?  - Studies have demonstrated that expectant, medical, and surgical management of early pregnancy loss all result in complete evacuation of pregnancy tissue in most patients, and serious complications are rare.   - As a primary approach, surgical evacuation results in faster and more predictable complete evacuation. The success of surgical uterine evacuation of early pregnancy loss approaches 99%.   - Overall, serious complications after early pregnancy loss treatment are rare and are comparable across treatment types.   - Clinically important intrauterine adhesion formation is a rare complication after surgical evacuation.   - Hemorrhage and infection can occur with all of the treatment approaches.      Counseling regarding interpregnancy interval after early pregnancy loss?  - There are no quality data to support delaying conception after early pregnancy loss to prevent subsequent early pregnancy loss or other pregnancy complications. Small observational studies show no benefit to delayed conception after early pregnancy loss.   - Abstaining from vaginal intercourse for 1-2 weeks after complete passage of pregnancy tissue generally is recommended to reduce the risk of infection.     What type of workup is needed after early pregnancy loss?  - No workup generally is recommended until after the second consecutive clinical early pregnancy loss

## 2024-03-15 ENCOUNTER — TELEPHONE (OUTPATIENT)
Dept: OBSTETRICS AND GYNECOLOGY | Facility: CLINIC | Age: 34
End: 2024-03-15
Payer: COMMERCIAL

## 2024-03-15 NOTE — TELEPHONE ENCOUNTER
Patient called regarding questions sent through portal.  Questions answered.  Will plan to treat on Tuesday if no bleeding/cramping.  Patient will message me.    Kayce Fong

## 2024-03-20 ENCOUNTER — OFFICE VISIT (OUTPATIENT)
Dept: OBSTETRICS AND GYNECOLOGY | Facility: CLINIC | Age: 34
End: 2024-03-20
Payer: COMMERCIAL

## 2024-03-20 VITALS
HEART RATE: 84 BPM | WEIGHT: 145.5 LBS | SYSTOLIC BLOOD PRESSURE: 126 MMHG | BODY MASS INDEX: 24.84 KG/M2 | DIASTOLIC BLOOD PRESSURE: 69 MMHG | HEIGHT: 64 IN

## 2024-03-20 DIAGNOSIS — O02.1 MISSED ABORTION: Primary | ICD-10-CM

## 2024-03-20 PROCEDURE — 99999 PR PBB SHADOW E&M-EST. PATIENT-LVL III: CPT | Mod: PBBFAC,,, | Performed by: OBSTETRICS & GYNECOLOGY

## 2024-03-20 PROCEDURE — 3078F DIAST BP <80 MM HG: CPT | Mod: CPTII,S$GLB,, | Performed by: OBSTETRICS & GYNECOLOGY

## 2024-03-20 PROCEDURE — 3074F SYST BP LT 130 MM HG: CPT | Mod: CPTII,S$GLB,, | Performed by: OBSTETRICS & GYNECOLOGY

## 2024-03-20 PROCEDURE — 3008F BODY MASS INDEX DOCD: CPT | Mod: CPTII,S$GLB,, | Performed by: OBSTETRICS & GYNECOLOGY

## 2024-03-20 PROCEDURE — 99213 OFFICE O/P EST LOW 20 MIN: CPT | Mod: S$GLB,,, | Performed by: OBSTETRICS & GYNECOLOGY

## 2024-03-20 PROCEDURE — 1159F MED LIST DOCD IN RCRD: CPT | Mod: CPTII,S$GLB,, | Performed by: OBSTETRICS & GYNECOLOGY

## 2024-03-20 RX ORDER — MISOPROSTOL 200 UG/1
800 TABLET ORAL ONCE
Qty: 4 TABLET | Refills: 0 | Status: SHIPPED | OUTPATIENT
Start: 2024-03-20 | End: 2024-03-20

## 2024-03-20 RX ORDER — HYDROCODONE BITARTRATE AND ACETAMINOPHEN 5; 325 MG/1; MG/1
1 TABLET ORAL EVERY 6 HOURS PRN
Qty: 5 TABLET | Refills: 0 | Status: SHIPPED | OUTPATIENT
Start: 2024-03-20

## 2024-03-20 NOTE — PROGRESS NOTES
Gynecology    SUBJECTIVE:     Chief Complaint: Follow-up       History of Present Illness:  33 year old who presents s/p diagnosis of missed ab.  Patient is 3 wk s/p diagnosis via ultrasound.  She initially chose expectant management, but has not had any signs or symptoms of miscarriage.  She presents today for further confirmation that she has had a missed ab.      Handheld ultrasound today confirms small gestational sac, small CRL, no FHT.  If viable, patient would be approximately 11 weeks.    Review of Systems:  Review of Systems   Constitutional:  Negative for appetite change, fever and unexpected weight change.   Respiratory:  Negative for shortness of breath.    Cardiovascular:  Negative for chest pain.   Gastrointestinal:  Negative for nausea and vomiting.   Genitourinary:  Negative for menorrhagia, menstrual problem, pelvic pain, vaginal bleeding, vaginal discharge, vaginal pain and vaginal odor.   Integumentary:  Negative for breast mass.   Breast: Negative for lump and mass       OBJECTIVE:     Physical Exam:  Physical Exam  Vitals and nursing note reviewed.   Constitutional:       Appearance: She is well-developed.   Pulmonary:      Effort: Pulmonary effort is normal.   Skin:     Coloration: Skin is not pale.   Neurological:      Mental Status: She is oriented to person, place, and time.   Psychiatric:         Behavior: Behavior normal.         Thought Content: Thought content normal.         Judgment: Judgment normal.     Handheld u/s reveals no FHT    ASSESSMENT:       ICD-10-CM ICD-9-CM    1. Missed   O02.1 632 miSOPROStoL (CYTOTEC) 200 MCG Tab      HYDROcodone-acetaminophen (NORCO) 5-325 mg per tablet             Plan:      Shira was seen today for follow-up.    Diagnoses and all orders for this visit:    Missed   -     miSOPROStoL (CYTOTEC) 200 MCG Tab; Place 4 tablets (800 mcg total) vaginally once. for 1 dose  -     HYDROcodone-acetaminophen (NORCO) 5-325 mg per tablet; Take 1  tablet by mouth every 6 (six) hours as needed for Pain.    - rx for cytotec with instructions to patient for use; notify me if no passage of products  - norco rx to pharmacy for pain relief    No orders of the defined types were placed in this encounter.        Kayce Fong

## 2024-03-27 ENCOUNTER — LAB VISIT (OUTPATIENT)
Dept: LAB | Facility: HOSPITAL | Age: 34
End: 2024-03-27
Attending: OBSTETRICS & GYNECOLOGY
Payer: COMMERCIAL

## 2024-03-27 DIAGNOSIS — O02.1 MISSED AB: ICD-10-CM

## 2024-03-27 LAB — HCG INTACT+B SERPL-ACNC: 186 MIU/ML

## 2024-03-27 PROCEDURE — 84702 CHORIONIC GONADOTROPIN TEST: CPT | Performed by: OBSTETRICS & GYNECOLOGY

## 2024-03-27 PROCEDURE — 36415 COLL VENOUS BLD VENIPUNCTURE: CPT | Mod: PO | Performed by: OBSTETRICS & GYNECOLOGY

## 2024-04-03 ENCOUNTER — LAB VISIT (OUTPATIENT)
Dept: LAB | Facility: HOSPITAL | Age: 34
End: 2024-04-03
Attending: OBSTETRICS & GYNECOLOGY
Payer: COMMERCIAL

## 2024-04-03 DIAGNOSIS — O02.1 MISSED AB: ICD-10-CM

## 2024-04-03 LAB — HCG INTACT+B SERPL-ACNC: 61 MIU/ML

## 2024-04-03 PROCEDURE — 84702 CHORIONIC GONADOTROPIN TEST: CPT | Performed by: OBSTETRICS & GYNECOLOGY

## 2024-04-03 PROCEDURE — 36415 COLL VENOUS BLD VENIPUNCTURE: CPT | Mod: PO | Performed by: OBSTETRICS & GYNECOLOGY

## 2024-04-17 ENCOUNTER — LAB VISIT (OUTPATIENT)
Dept: LAB | Facility: HOSPITAL | Age: 34
End: 2024-04-17
Attending: OBSTETRICS & GYNECOLOGY
Payer: COMMERCIAL

## 2024-04-17 DIAGNOSIS — O02.1 MISSED AB: ICD-10-CM

## 2024-04-17 LAB — HCG INTACT+B SERPL-ACNC: 8.4 MIU/ML

## 2024-04-17 PROCEDURE — 84702 CHORIONIC GONADOTROPIN TEST: CPT | Performed by: OBSTETRICS & GYNECOLOGY

## 2024-04-17 PROCEDURE — 36415 COLL VENOUS BLD VENIPUNCTURE: CPT | Mod: PO | Performed by: OBSTETRICS & GYNECOLOGY

## 2024-04-24 ENCOUNTER — LAB VISIT (OUTPATIENT)
Dept: LAB | Facility: HOSPITAL | Age: 34
End: 2024-04-24
Attending: OBSTETRICS & GYNECOLOGY
Payer: COMMERCIAL

## 2024-04-24 ENCOUNTER — PATIENT MESSAGE (OUTPATIENT)
Dept: OBSTETRICS AND GYNECOLOGY | Facility: CLINIC | Age: 34
End: 2024-04-24
Payer: COMMERCIAL

## 2024-04-24 DIAGNOSIS — O02.1 MISSED AB: ICD-10-CM

## 2024-04-24 PROCEDURE — 84702 CHORIONIC GONADOTROPIN TEST: CPT | Performed by: OBSTETRICS & GYNECOLOGY

## 2024-04-24 PROCEDURE — 36415 COLL VENOUS BLD VENIPUNCTURE: CPT | Mod: PO | Performed by: OBSTETRICS & GYNECOLOGY

## 2024-04-25 LAB — HCG INTACT+B SERPL-ACNC: 3.1 MIU/ML

## 2024-07-02 ENCOUNTER — PATIENT MESSAGE (OUTPATIENT)
Dept: OBSTETRICS AND GYNECOLOGY | Facility: CLINIC | Age: 34
End: 2024-07-02
Payer: COMMERCIAL

## 2024-07-09 ENCOUNTER — CLINICAL SUPPORT (OUTPATIENT)
Dept: OBSTETRICS AND GYNECOLOGY | Facility: CLINIC | Age: 34
End: 2024-07-09
Payer: COMMERCIAL

## 2024-07-09 DIAGNOSIS — N91.2 AMENORRHEA: Primary | ICD-10-CM

## 2024-07-22 ENCOUNTER — OFFICE VISIT (OUTPATIENT)
Dept: FAMILY MEDICINE | Facility: CLINIC | Age: 34
End: 2024-07-22
Payer: COMMERCIAL

## 2024-07-22 VITALS
TEMPERATURE: 99 F | RESPIRATION RATE: 16 BRPM | HEART RATE: 59 BPM | OXYGEN SATURATION: 99 % | HEIGHT: 64 IN | WEIGHT: 143.31 LBS | SYSTOLIC BLOOD PRESSURE: 100 MMHG | DIASTOLIC BLOOD PRESSURE: 54 MMHG | BODY MASS INDEX: 24.46 KG/M2

## 2024-07-22 DIAGNOSIS — Z00.00 ENCOUNTER FOR PREVENTIVE HEALTH EXAMINATION: Primary | ICD-10-CM

## 2024-07-22 DIAGNOSIS — R53.83 FATIGUE, UNSPECIFIED TYPE: ICD-10-CM

## 2024-07-22 DIAGNOSIS — Z34.90 PREGNANCY, UNSPECIFIED GESTATIONAL AGE: ICD-10-CM

## 2024-07-22 DIAGNOSIS — R79.9 ABNORMAL FINDING OF BLOOD CHEMISTRY, UNSPECIFIED: ICD-10-CM

## 2024-07-22 DIAGNOSIS — E53.8 B12 DEFICIENCY: ICD-10-CM

## 2024-07-22 PROCEDURE — 99395 PREV VISIT EST AGE 18-39: CPT | Mod: S$GLB,,, | Performed by: STUDENT IN AN ORGANIZED HEALTH CARE EDUCATION/TRAINING PROGRAM

## 2024-07-22 PROCEDURE — 99999 PR PBB SHADOW E&M-EST. PATIENT-LVL III: CPT | Mod: PBBFAC,,, | Performed by: STUDENT IN AN ORGANIZED HEALTH CARE EDUCATION/TRAINING PROGRAM

## 2024-07-22 PROCEDURE — 3078F DIAST BP <80 MM HG: CPT | Mod: CPTII,S$GLB,, | Performed by: STUDENT IN AN ORGANIZED HEALTH CARE EDUCATION/TRAINING PROGRAM

## 2024-07-22 PROCEDURE — 3008F BODY MASS INDEX DOCD: CPT | Mod: CPTII,S$GLB,, | Performed by: STUDENT IN AN ORGANIZED HEALTH CARE EDUCATION/TRAINING PROGRAM

## 2024-07-22 PROCEDURE — 1159F MED LIST DOCD IN RCRD: CPT | Mod: CPTII,S$GLB,, | Performed by: STUDENT IN AN ORGANIZED HEALTH CARE EDUCATION/TRAINING PROGRAM

## 2024-07-22 PROCEDURE — 3074F SYST BP LT 130 MM HG: CPT | Mod: CPTII,S$GLB,, | Performed by: STUDENT IN AN ORGANIZED HEALTH CARE EDUCATION/TRAINING PROGRAM

## 2024-07-22 NOTE — PROGRESS NOTES
[facility] Note    Subjective:    The HPI and pertinent ROS is included in the Diagnostic Impression Remarks section at the end of the note. Please see below for further details. Chief complaint is at end of note.     Shira is a pleasant intelligent patient who is here for evaluation.     Modified Medications    No medications on file       Data reviewed 274}  Previous medical records reviewed and summarized in plan section at end of note.      If you are due for any health screening(s) below please notify me so we can arrange them to be ordered and scheduled. Most healthy patients at your age complete them, but you are free to accept or refuse. If you can't do it, I'll definitely understand. If you can, I'd certainly appreciate it!     All of your core healthy metrics are met.      The following portions of the patient's history were reviewed and updated as appropriate: allergies, current medications, past family history, past medical history, past social history, past surgical history and problem list.    She  has a past medical history of Abnormal Pap smear of cervix (2010), GERD (gastroesophageal reflux disease), and Vocal nodules in adults.  She  has a past surgical history that includes [place] tooth extraction and Colonoscopy (N/A, 8/11/2023).    She  reports that she has never smoked. She has never been exposed to tobacco smoke. She has never used smokeless tobacco. She reports that she does not currently use alcohol. She reports that she does not use drugs.  She family history includes Breast cancer in her maternal aunt and maternal grandmother; Cancer in her father; Colon cancer (age of onset: 41) in her father; Coronary artery disease in her father and paternal aunt; Diabetes in her father and paternal aunt; Diverticulitis in her mother; Heart attacks under age 50 in her paternal aunt; Hypertension in her father; Psoriasis in her father.    Review of patient's allergies indicates:  No  "Known Allergies    Tobacco Use: Low Risk  (7/22/2024)    Patient History     Smoking Tobacco Use: Never     Smokeless Tobacco Use: Never     Passive Exposure: Never     Physical Examination  General appearance: alert, cooperative, no distress  Neck: no thyromegaly, no neck stiffness  Lungs: clear to auscultation, no wheezes, rales or rhonchi, symmetric air entry  Heart: normal rate, regular rhythm, normal S1, S2, no murmurs, rubs, clicks or gallops  Abdomen: soft, nontender, nondistended, no rigidity, rebound, or guarding.   Back: no point tenderness over spine  Extremities: peripheral pulses normal, no unilateral leg swelling or calf tenderness   Neurological:alert, oriented, normal speech, no new focal findings or movement disorder noted from baseline    BP Readings from Last 3 Encounters:   07/22/24 (!) 100/54   03/20/24 126/69   02/27/24 116/63     Wt Readings from Last 3 Encounters:   07/22/24 65 kg (143 lb 4.8 oz)   03/20/24 66 kg (145 lb 8.1 oz)   02/27/24 67.1 kg (147 lb 14.9 oz)     BP (!) 100/54 (BP Location: Right arm, Patient Position: Sitting, BP Method: Medium (Manual))   Pulse (!) 59   Temp 98.8 °F (37.1 °C) (Oral)   Resp 16   Ht 5' 4" (1.626 m)   Wt 65 kg (143 lb 4.8 oz)   LMP 12/26/2023   SpO2 99%   BMI 24.60 kg/m²    274}  Laboratory: I have reviewed old labs below:    274}    Lab Results   Component Value Date    WBC 5.05 02/27/2024    HGB 12.6 02/27/2024    HCT 36.5 (L) 02/27/2024    MCV 97 02/27/2024     02/27/2024     05/25/2023    K 3.7 05/25/2023     05/25/2023    CALCIUM 9.6 05/25/2023    CO2 25 05/25/2023    GLU 76 05/25/2023    BUN 8 05/25/2023    CREATININE 0.8 05/25/2023    EGFRNORACEVR >60.0 05/25/2023    ANIONGAP 9 05/25/2023    PROT 7.0 05/25/2023    ALBUMIN 4.3 05/25/2023    BILITOT 1.1 (H) 05/25/2023    ALKPHOS 54 (L) 05/25/2023    ALT 11 05/25/2023    AST 15 05/25/2023    CHOL 192 05/25/2023    TRIG 108 05/25/2023    HDL 51 05/25/2023    LDLCALC 119.4 " "05/25/2023    TSH 1.793 05/25/2023    HGBA1C 4.8 05/25/2023      Lab reviewed by me: Particular labs of significance that I will monitor, workup, or treat to improve are mentioned below in diagnostic impression remarks.    Imaging/EKG: I have reviewed the pertinent results and my findings are noted in remarks.  274}    CC:   Chief Complaint   Patient presents with    Annual Exam        274}    Assessment/Plan  Shira Ashton is a 33 y.o. female who presents to clinic with:  1. Encounter for preventive health examination    2. Pregnancy, unspecified gestational age    3. Fatigue, unspecified type    4. B12 deficiency    5. Abnormal finding of blood chemistry, unspecified       274}  Diagnostic Impression Remarks + HPI     Documentation entered by me for this encounter may have been done in part using speech-recognition technology. Although I have made an effort to ensure accuracy, "sound like" errors may exist and should be interpreted in context.     Preventive-will get blood work and follow-up on this doing well.  Pregnancy pt reports she is pregannt taking prenatal some fatigue but otherwise no nausea or symptoms has appt willl add on labs  Fatigue will check iron, b12 TSH  B12 def rec take B12 will recheck     This is the extent of this pleasant patient's concerns at this present time. She did not feel chest pain upon exertion, dyspnea, nausea, vomiting, diaphoresis, or syncope. No pleuritic chest pain, unilateral leg swelling, calf tenderness, or calf pain. Negative for unintentional weight loss night sweats, hematuria, and fevers.     Additional workup planned: see labs ordered below.    See below for labs and meds ordered with associated diagnosis      1. Pregnancy, unspecified gestational age    2. Encounter for preventive health examination  - CBC Auto Differential; Future  - Comprehensive Metabolic Panel; Future  - TSH; Future  - Hemoglobin A1C; Future  - Lipid Panel; Future    3. Fatigue, unspecified type  - " TSH; Future  - Iron and TIBC; Future  - Ferritin; Future  - Vitamin B12 Deficiency Panel; Future  - Hemoglobin A1C; Future    4. B12 deficiency  - Ferritin; Future  - Vitamin B12 Deficiency Panel; Future    5. Abnormal finding of blood chemistry, unspecified  - Folate; Future      Ronn De Oliveira MD   274}    If you are due for any health screening(s) below please notify me so we can arrange them to be ordered and scheduled. Most healthy patients at your age complete them, but you are free to accept or refuse.     If you can't do it, I'll definitely understand. If you can, I'd certainly appreciate it!   All of your core healthy metrics are met.

## 2024-07-24 ENCOUNTER — HOSPITAL ENCOUNTER (OUTPATIENT)
Dept: PERINATAL CARE | Facility: OTHER | Age: 34
Discharge: HOME | End: 2024-07-24
Attending: OBSTETRICS & GYNECOLOGY
Payer: COMMERCIAL

## 2024-07-24 ENCOUNTER — OFFICE VISIT (OUTPATIENT)
Dept: OBSTETRICS AND GYNECOLOGY | Facility: CLINIC | Age: 34
End: 2024-07-24
Payer: COMMERCIAL

## 2024-07-24 VITALS — WEIGHT: 144.38 LBS | BODY MASS INDEX: 24.65 KG/M2 | HEIGHT: 64 IN

## 2024-07-24 DIAGNOSIS — Z32.01 POSITIVE PREGNANCY TEST: ICD-10-CM

## 2024-07-24 DIAGNOSIS — N91.2 AMENORRHEA: Primary | ICD-10-CM

## 2024-07-24 DIAGNOSIS — O36.80X0 PREGNANCY WITH UNCERTAIN FETAL VIABILITY, SINGLE OR UNSPECIFIED FETUS: ICD-10-CM

## 2024-07-24 DIAGNOSIS — N91.2 AMENORRHEA: ICD-10-CM

## 2024-07-24 PROCEDURE — 1159F MED LIST DOCD IN RCRD: CPT | Mod: CPTII,S$GLB,,

## 2024-07-24 PROCEDURE — 1160F RVW MEDS BY RX/DR IN RCRD: CPT | Mod: CPTII,S$GLB,,

## 2024-07-24 PROCEDURE — 99213 OFFICE O/P EST LOW 20 MIN: CPT | Mod: S$GLB,,,

## 2024-07-24 PROCEDURE — 3008F BODY MASS INDEX DOCD: CPT | Mod: CPTII,S$GLB,,

## 2024-07-24 PROCEDURE — 99999 PR PBB SHADOW E&M-EST. PATIENT-LVL III: CPT | Mod: PBBFAC,,,

## 2024-07-24 PROCEDURE — 76801 OB US < 14 WKS SINGLE FETUS: CPT | Mod: 26,,, | Performed by: OBSTETRICS & GYNECOLOGY

## 2024-07-24 NOTE — PROGRESS NOTES
CC: Positive Pregnancy Test    HISTORY OF PRESENT ILLNESS:    Shira Ashton is a 33 y.o. female, ,  Presents today for a routine exam complaining of amenorrhea and positive home urine pregnancy test.  Patient's last menstrual period was 2024.  Pt reports menses were normal prior to this.  Did have missed AB 2024- took cytotec in 3/2024. Had 2 periods after that. She is not currently on any contraception. Denies nausea. Reports breast tenderness. Denies pelvic pain.    LMP: 24   EGA: 8w5d (per LMP)  EDC: (per LMP)    Indication   ========   Indication: Estimation of Gestational Age     History   ======   Risk Factors   History risk factors: Hx LEEP / CKC     Method   ======   Vol[place]on S8, Transabdominal and transvaginal ultrasound examination. View: Good view     Pregnancy   =========   Gallardo pregnancy. Number of embryos: 1     Dating   ======   LMP on: 2024   GA by LMP 8 w + 5 d   DESIREE by LMP: 2025   Ultrasound examination on: 2024   GA by U/S based upon: CRL   GA by U/S 6 w + 1 d   DESIREE by U/S: 3/18/2025   Assigned: based on ultrasound (CRL), selected on 2024   Assigned GA 6 w + 1 d   Assigned DESIREE: 3/18/2025     Assessment   ==========   Gestational sac: visualized   Location: intrauterine   Yolk sac: visualized   Amniotic sac: not visualized   Embryo: visualized   CRL 4.2 mm 6w 1d Hadlock   Cardiac activity: absent     Maternal Structures   ===============   Uterus / Cervix   Uterus: Normal   Cervix: Visualized   Approach: Transvaginal   Ovaries / Tubes / Adnexa   Rt ovary: Normal   Rt ovary D1 43 mm   Rt ovary D2 34 mm   Rt ovary D3 27 mm   Rt ovary Vol 20.7 cmï¿½   Rt ovarian corpus luteum: visualized   Rt ovarian corpus luteum D1 26.0 mm   Rt ovarian corpus luteum D2 18.0 mm   Rt ovarian corpus luteum D3 31.0 mm   Lt ovary: Normal   Lt ovary D1 26 mm   Lt ovary D2 15 mm   Lt ovary D3 14 mm   Lt ovary Vol 3.0 cmï¿½   Cul de Sac / Bladder / Kidneys / Other   Free fluid: No  "free fluid visualized     Impression   =========   A burger IUP is identified. The embryo is disproportionately small for the size of the amniotic sac (measures 4.2 mm ), and no embryonic heart motion is seen by   realtime scanning. The CRL measures less than 7 mm. While these ultrasound findings are concerning, they are not diagnostic for a nonviable IUP.   Other clinical data, as available, should be used in conjunction with ultrasound findings to make a final determination regarding viability.   If other clinical data are not available to confirm a nonviable IUP, recommend f/u exam in 7+ days to assess viability.   The maternal adnexae are normal in appearance. The amount of free fluid seen in the pelvis is in the normal physiologic range.     Limitations   =========   Please note: Prenatal ultrasound studies have limitations. They do not detect all fetal, genetic, placental, and maternal abnormalities. A normal appearing prenatal   ultrasound is reassuring. However, it does not guarantee the absence of an abnormality or predict a normal outcome for the fetus or the mother.     ROS:  GENERAL: No weight changes. No swelling. No fatigue. No fever.  CARDIOVASCULAR: No chest pain. No shortness of breath. No leg cramps.   NEUROLOGICAL: No headaches. No vision changes.  BREASTS: No pain. No lumps. No discharge.  ABDOMEN: No pain. No diarrhea. No constipation.  REPRODUCTIVE: No abnormal bleeding.   VULVA: No pain. No lesions. No itching.  VAGINA: No relaxation. No itching. No odor. No discharge. No lesions.  URINARY: No incontinence. No nocturia. No frequency. No dysuria.    MEDICATIONS AND ALLERGIES:  Reviewed    Ht 5' 4" (1.626 m)   Wt 65.5 kg (144 lb 6.4 oz)   LMP 05/24/2024   BMI 24.79 kg/m²     PE:  AFFECT: Calm, alert and oriented X 3. Interactive during exam  GENERAL: Appears well-nourished, well-developed, in no acute distress.  HEAD: Normocephalic, atraumatic  TEETH: Good dentition.  SKIN: Normal for " race, warm, & dry. No lesions or rashes.  LUNGS: Easy and unlabored  HEART: Regular rate  EXTREMITIES: No cyanosis, clubbing or edema. No calf tenderness.    ASSESSMENT/PLAN:  Amenorrhea  Dating ultrasound today - needs f/u in 7+ days  Will schedule for f/u dating ultrasound with visit after- will do labs/pap at that visit   Discussed HCG levels today- not interested     Vanessa Padilla NP    OB/GYN

## 2024-07-31 ENCOUNTER — PATIENT MESSAGE (OUTPATIENT)
Dept: OBSTETRICS AND GYNECOLOGY | Facility: CLINIC | Age: 34
End: 2024-07-31
Payer: COMMERCIAL

## 2024-08-01 ENCOUNTER — LAB VISIT (OUTPATIENT)
Dept: LAB | Facility: HOSPITAL | Age: 34
End: 2024-08-01
Attending: STUDENT IN AN ORGANIZED HEALTH CARE EDUCATION/TRAINING PROGRAM
Payer: COMMERCIAL

## 2024-08-01 ENCOUNTER — TELEPHONE (OUTPATIENT)
Dept: UROGYNECOLOGY | Facility: CLINIC | Age: 34
End: 2024-08-01
Payer: COMMERCIAL

## 2024-08-01 DIAGNOSIS — O20.9 VAGINAL BLEEDING AFFECTING EARLY PREGNANCY: Primary | ICD-10-CM

## 2024-08-01 DIAGNOSIS — Z00.00 ENCOUNTER FOR PREVENTIVE HEALTH EXAMINATION: ICD-10-CM

## 2024-08-01 DIAGNOSIS — R79.9 ABNORMAL FINDING OF BLOOD CHEMISTRY, UNSPECIFIED: ICD-10-CM

## 2024-08-01 DIAGNOSIS — E53.8 B12 DEFICIENCY: ICD-10-CM

## 2024-08-01 DIAGNOSIS — R53.83 FATIGUE, UNSPECIFIED TYPE: ICD-10-CM

## 2024-08-01 LAB
ALBUMIN SERPL BCP-MCNC: 4.2 G/DL (ref 3.5–5.2)
ALP SERPL-CCNC: 42 U/L (ref 55–135)
ALT SERPL W/O P-5'-P-CCNC: 13 U/L (ref 10–44)
ANION GAP SERPL CALC-SCNC: 9 MMOL/L (ref 8–16)
AST SERPL-CCNC: 17 U/L (ref 10–40)
BASOPHILS # BLD AUTO: 0.03 K/UL (ref 0–0.2)
BASOPHILS NFR BLD: 0.5 % (ref 0–1.9)
BILIRUB SERPL-MCNC: 1 MG/DL (ref 0.1–1)
BUN SERPL-MCNC: 9 MG/DL (ref 6–20)
CALCIUM SERPL-MCNC: 9.8 MG/DL (ref 8.7–10.5)
CHLORIDE SERPL-SCNC: 106 MMOL/L (ref 95–110)
CHOLEST SERPL-MCNC: 228 MG/DL (ref 120–199)
CHOLEST/HDLC SERPL: 4 {RATIO} (ref 2–5)
CO2 SERPL-SCNC: 22 MMOL/L (ref 23–29)
CREAT SERPL-MCNC: 0.8 MG/DL (ref 0.5–1.4)
DIFFERENTIAL METHOD BLD: ABNORMAL
EOSINOPHIL # BLD AUTO: 0.1 K/UL (ref 0–0.5)
EOSINOPHIL NFR BLD: 0.9 % (ref 0–8)
ERYTHROCYTE [DISTWIDTH] IN BLOOD BY AUTOMATED COUNT: 11.6 % (ref 11.5–14.5)
EST. GFR  (NO RACE VARIABLE): >60 ML/MIN/1.73 M^2
ESTIMATED AVG GLUCOSE: 91 MG/DL (ref 68–131)
FERRITIN SERPL-MCNC: 140 NG/ML (ref 20–300)
FOLATE SERPL-MCNC: 14.7 NG/ML (ref 4–24)
GLUCOSE SERPL-MCNC: 90 MG/DL (ref 70–110)
HBA1C MFR BLD: 4.8 % (ref 4–5.6)
HCT VFR BLD AUTO: 37.4 % (ref 37–48.5)
HDLC SERPL-MCNC: 57 MG/DL (ref 40–75)
HDLC SERPL: 25 % (ref 20–50)
HGB BLD-MCNC: 13 G/DL (ref 12–16)
IMM GRANULOCYTES # BLD AUTO: 0.02 K/UL (ref 0–0.04)
IMM GRANULOCYTES NFR BLD AUTO: 0.3 % (ref 0–0.5)
IRON SERPL-MCNC: 137 UG/DL (ref 30–160)
LDLC SERPL CALC-MCNC: 152 MG/DL (ref 63–159)
LYMPHOCYTES # BLD AUTO: 2 K/UL (ref 1–4.8)
LYMPHOCYTES NFR BLD: 34.2 % (ref 18–48)
MCH RBC QN AUTO: 34.3 PG (ref 27–31)
MCHC RBC AUTO-ENTMCNC: 34.8 G/DL (ref 32–36)
MCV RBC AUTO: 99 FL (ref 82–98)
MONOCYTES # BLD AUTO: 0.4 K/UL (ref 0.3–1)
MONOCYTES NFR BLD: 6.7 % (ref 4–15)
NEUTROPHILS # BLD AUTO: 3.4 K/UL (ref 1.8–7.7)
NEUTROPHILS NFR BLD: 57.4 % (ref 38–73)
NONHDLC SERPL-MCNC: 171 MG/DL
NRBC BLD-RTO: 0 /100 WBC
PLATELET # BLD AUTO: 272 K/UL (ref 150–450)
PMV BLD AUTO: 10.2 FL (ref 9.2–12.9)
POTASSIUM SERPL-SCNC: 4 MMOL/L (ref 3.5–5.1)
PROT SERPL-MCNC: 6.7 G/DL (ref 6–8.4)
RBC # BLD AUTO: 3.79 M/UL (ref 4–5.4)
SATURATED IRON: 42 % (ref 20–50)
SODIUM SERPL-SCNC: 137 MMOL/L (ref 136–145)
TOTAL IRON BINDING CAPACITY: 323 UG/DL (ref 250–450)
TRANSFERRIN SERPL-MCNC: 218 MG/DL (ref 200–375)
TRIGL SERPL-MCNC: 95 MG/DL (ref 30–150)
TSH SERPL DL<=0.005 MIU/L-ACNC: 1.43 UIU/ML (ref 0.4–4)
WBC # BLD AUTO: 5.85 K/UL (ref 3.9–12.7)

## 2024-08-01 PROCEDURE — 84443 ASSAY THYROID STIM HORMONE: CPT | Performed by: STUDENT IN AN ORGANIZED HEALTH CARE EDUCATION/TRAINING PROGRAM

## 2024-08-01 PROCEDURE — 36415 COLL VENOUS BLD VENIPUNCTURE: CPT | Mod: PO | Performed by: STUDENT IN AN ORGANIZED HEALTH CARE EDUCATION/TRAINING PROGRAM

## 2024-08-01 PROCEDURE — 82607 VITAMIN B-12: CPT | Performed by: STUDENT IN AN ORGANIZED HEALTH CARE EDUCATION/TRAINING PROGRAM

## 2024-08-01 PROCEDURE — 82728 ASSAY OF FERRITIN: CPT | Performed by: STUDENT IN AN ORGANIZED HEALTH CARE EDUCATION/TRAINING PROGRAM

## 2024-08-01 PROCEDURE — 85025 COMPLETE CBC W/AUTO DIFF WBC: CPT | Performed by: STUDENT IN AN ORGANIZED HEALTH CARE EDUCATION/TRAINING PROGRAM

## 2024-08-01 PROCEDURE — 80053 COMPREHEN METABOLIC PANEL: CPT | Performed by: STUDENT IN AN ORGANIZED HEALTH CARE EDUCATION/TRAINING PROGRAM

## 2024-08-01 PROCEDURE — 82746 ASSAY OF FOLIC ACID SERUM: CPT | Performed by: STUDENT IN AN ORGANIZED HEALTH CARE EDUCATION/TRAINING PROGRAM

## 2024-08-01 PROCEDURE — 83540 ASSAY OF IRON: CPT | Performed by: STUDENT IN AN ORGANIZED HEALTH CARE EDUCATION/TRAINING PROGRAM

## 2024-08-01 PROCEDURE — 83036 HEMOGLOBIN GLYCOSYLATED A1C: CPT | Performed by: STUDENT IN AN ORGANIZED HEALTH CARE EDUCATION/TRAINING PROGRAM

## 2024-08-01 PROCEDURE — 80061 LIPID PANEL: CPT | Performed by: STUDENT IN AN ORGANIZED HEALTH CARE EDUCATION/TRAINING PROGRAM

## 2024-08-01 PROCEDURE — 83921 ORGANIC ACID SINGLE QUANT: CPT | Performed by: STUDENT IN AN ORGANIZED HEALTH CARE EDUCATION/TRAINING PROGRAM

## 2024-08-01 NOTE — TELEPHONE ENCOUNTER
Bed: ED22  Expected date:   Expected time:   Means of arrival:   Comments:  Brielle #3   Call to ptJanel VALDEZ. Message sent.

## 2024-08-01 NOTE — TELEPHONE ENCOUNTER
On Sunday after having sex, she had some light spotting for a couple of days  Yesterday around 6 pm she had a gush of blood and has had a heavier flow yesterday and today. Has been ligther today.   She is cramping but it is not as severe as when she took Cytotec previously.  Discussed based on u/s findings and bleeding pattern, concern for SAB.   Discussed mgmt options and she desires beta hCG trend. Will check today.   Discussed lower concern for ectopic due to dating u/s visualizing IUP. Low risk for heterotopic pregnancy. Precautions given.   Support given. Nothing she or partner did. Will route note to Dr. Fong to discuss next steps.   All questions answered.    ----- Message from Hima Nielson sent at 8/1/2024 10:24 AM CDT -----  Regarding: Self [phone]  Type:  Patient Returning Call    Who Called:  Self      Who Left Message for Patient:  Mireya Capone NP    Does the patient know what this is regarding?: yes     Would the patient rather a call back or a response via [facility]?  Call back     Best Call Back Number: [phone]     Additional Information:     Thank you.

## 2024-08-02 ENCOUNTER — PATIENT MESSAGE (OUTPATIENT)
Dept: OBSTETRICS AND GYNECOLOGY | Facility: CLINIC | Age: 34
End: 2024-08-02
Payer: COMMERCIAL

## 2024-08-02 DIAGNOSIS — O20.9 VAGINAL BLEEDING AFFECTING EARLY PREGNANCY: ICD-10-CM

## 2024-08-02 DIAGNOSIS — O36.80X0 PREGNANCY WITH UNCERTAIN FETAL VIABILITY, SINGLE OR UNSPECIFIED FETUS: Primary | ICD-10-CM

## 2024-08-05 ENCOUNTER — LAB VISIT (OUTPATIENT)
Dept: LAB | Facility: HOSPITAL | Age: 34
End: 2024-08-05
Attending: NURSE PRACTITIONER
Payer: COMMERCIAL

## 2024-08-05 DIAGNOSIS — O36.80X0 PREGNANCY WITH UNCERTAIN FETAL VIABILITY, SINGLE OR UNSPECIFIED FETUS: ICD-10-CM

## 2024-08-05 DIAGNOSIS — O20.9 VAGINAL BLEEDING AFFECTING EARLY PREGNANCY: ICD-10-CM

## 2024-08-05 LAB
ABO + RH BLD: NORMAL
BLD GP AB SCN CELLS X3 SERPL QL: NORMAL
HCG INTACT+B SERPL-ACNC: 136 MIU/ML
SPECIMEN OUTDATE: NORMAL
VIT B12 SERPL-MCNC: 282 NG/L (ref 180–914)

## 2024-08-05 PROCEDURE — 84702 CHORIONIC GONADOTROPIN TEST: CPT | Performed by: NURSE PRACTITIONER

## 2024-08-05 PROCEDURE — 36415 COLL VENOUS BLD VENIPUNCTURE: CPT | Mod: PO | Performed by: NURSE PRACTITIONER

## 2024-08-05 PROCEDURE — 86900 BLOOD TYPING SEROLOGIC ABO: CPT | Performed by: NURSE PRACTITIONER

## 2024-08-05 PROCEDURE — 86901 BLOOD TYPING SEROLOGIC RH(D): CPT | Performed by: NURSE PRACTITIONER

## 2024-08-05 PROCEDURE — 86850 RBC ANTIBODY SCREEN: CPT | Performed by: NURSE PRACTITIONER

## 2024-08-06 ENCOUNTER — PATIENT MESSAGE (OUTPATIENT)
Dept: OBSTETRICS AND GYNECOLOGY | Facility: CLINIC | Age: 34
End: 2024-08-06
Payer: COMMERCIAL

## 2024-08-06 ENCOUNTER — PATIENT MESSAGE (OUTPATIENT)
Dept: FAMILY MEDICINE | Facility: CLINIC | Age: 34
End: 2024-08-06
Payer: COMMERCIAL

## 2024-08-06 DIAGNOSIS — O03.9 SPONTANEOUS ABORTION: Primary | ICD-10-CM

## 2024-08-07 LAB — METHYLMALONATE SERPL-SCNC: 0.11 NMOL/ML

## 2024-08-08 ENCOUNTER — LAB VISIT (OUTPATIENT)
Dept: LAB | Facility: HOSPITAL | Age: 34
End: 2024-08-08
Attending: OBSTETRICS & GYNECOLOGY
Payer: COMMERCIAL

## 2024-08-08 ENCOUNTER — OFFICE VISIT (OUTPATIENT)
Dept: OBSTETRICS AND GYNECOLOGY | Facility: CLINIC | Age: 34
End: 2024-08-08
Payer: COMMERCIAL

## 2024-08-08 VITALS
BODY MASS INDEX: 24.54 KG/M2 | DIASTOLIC BLOOD PRESSURE: 66 MMHG | HEIGHT: 64 IN | WEIGHT: 143.75 LBS | SYSTOLIC BLOOD PRESSURE: 114 MMHG

## 2024-08-08 DIAGNOSIS — N96 RECURRENT PREGNANCY LOSS: ICD-10-CM

## 2024-08-08 DIAGNOSIS — Z01.419 WELL WOMAN EXAM WITH ROUTINE GYNECOLOGICAL EXAM: Primary | ICD-10-CM

## 2024-08-08 PROBLEM — Z34.90 PREGNANCY: Status: RESOLVED | Noted: 2024-02-27 | Resolved: 2024-08-08

## 2024-08-08 LAB — HCG INTACT+B SERPL-ACNC: 56 MIU/ML

## 2024-08-08 PROCEDURE — 99999 PR PBB SHADOW E&M-EST. PATIENT-LVL III: CPT | Mod: PBBFAC,,, | Performed by: OBSTETRICS & GYNECOLOGY

## 2024-08-08 PROCEDURE — 84702 CHORIONIC GONADOTROPIN TEST: CPT | Performed by: OBSTETRICS & GYNECOLOGY

## 2024-08-08 PROCEDURE — 86146 BETA-2 GLYCOPROTEIN ANTIBODY: CPT | Mod: 59 | Performed by: OBSTETRICS & GYNECOLOGY

## 2024-08-08 PROCEDURE — 85613 RUSSELL VIPER VENOM DILUTED: CPT | Performed by: OBSTETRICS & GYNECOLOGY

## 2024-08-08 PROCEDURE — 85730 THROMBOPLASTIN TIME PARTIAL: CPT | Performed by: OBSTETRICS & GYNECOLOGY

## 2024-08-08 PROCEDURE — 3044F HG A1C LEVEL LT 7.0%: CPT | Mod: CPTII,S$GLB,, | Performed by: OBSTETRICS & GYNECOLOGY

## 2024-08-08 PROCEDURE — 1159F MED LIST DOCD IN RCRD: CPT | Mod: CPTII,S$GLB,, | Performed by: OBSTETRICS & GYNECOLOGY

## 2024-08-08 PROCEDURE — 3078F DIAST BP <80 MM HG: CPT | Mod: CPTII,S$GLB,, | Performed by: OBSTETRICS & GYNECOLOGY

## 2024-08-08 PROCEDURE — 99395 PREV VISIT EST AGE 18-39: CPT | Mod: S$GLB,,, | Performed by: OBSTETRICS & GYNECOLOGY

## 2024-08-08 PROCEDURE — 3074F SYST BP LT 130 MM HG: CPT | Mod: CPTII,S$GLB,, | Performed by: OBSTETRICS & GYNECOLOGY

## 2024-08-08 PROCEDURE — 86147 CARDIOLIPIN ANTIBODY EA IG: CPT | Mod: 59 | Performed by: OBSTETRICS & GYNECOLOGY

## 2024-08-08 PROCEDURE — 3008F BODY MASS INDEX DOCD: CPT | Mod: CPTII,S$GLB,, | Performed by: OBSTETRICS & GYNECOLOGY

## 2024-08-13 LAB
B2 GLYCOPROT1 IGA SER QL: 1 U/ML
B2 GLYCOPROT1 IGG SER QL: 4.7 U/ML
B2 GLYCOPROT1 IGM SER QL: <2.4 U/ML
CARDIOLIPIN IGG SER IA-ACNC: <9.4 GPL (ref 0–14.99)
CARDIOLIPIN IGM SER IA-ACNC: <9.4 MPL (ref 0–12.49)
CONFIRM DRVVT STA-STACLOT: NORMAL S
DRVVT SCREEN TO CONFIRM RATIO: NORMAL {RATIO}
HEPARIN NT PPP QL: NORMAL
LA 3 SCREEN W REFLEX-IMP: NORMAL
LMW HEPARIN IND PLT AB SER QL: NORMAL
MIXING DRVVT/NORMAL: NORMAL %
NEUTRALIZED DRVVT SCREEN RATIO: NORMAL
PROTHROMBIN TIME: 13.7 S (ref 12–15.5)
SCREEN APTT/NORMAL: 0.93
SCREEN APTT/NORMAL: NORMAL
SCREEN DRVVT/NORMAL: 0.8 %
THROMBIN TIME: NORMAL S

## 2024-09-03 ENCOUNTER — PATIENT MESSAGE (OUTPATIENT)
Dept: OBSTETRICS AND GYNECOLOGY | Facility: CLINIC | Age: 34
End: 2024-09-03
Payer: COMMERCIAL

## 2024-09-03 DIAGNOSIS — N96 RECURRENT PREGNANCY LOSS: Primary | ICD-10-CM

## 2024-09-18 ENCOUNTER — TELEPHONE (OUTPATIENT)
Dept: MATERNAL FETAL MEDICINE | Facility: CLINIC | Age: 34
End: 2024-09-18
Payer: COMMERCIAL

## 2024-09-18 ENCOUNTER — PATIENT MESSAGE (OUTPATIENT)
Dept: OBSTETRICS AND GYNECOLOGY | Facility: CLINIC | Age: 34
End: 2024-09-18
Payer: COMMERCIAL

## 2024-09-25 ENCOUNTER — PROCEDURE VISIT (OUTPATIENT)
Dept: MATERNAL FETAL MEDICINE | Facility: CLINIC | Age: 34
End: 2024-09-25
Payer: COMMERCIAL

## 2024-09-25 DIAGNOSIS — N96 RECURRENT PREGNANCY LOSS: ICD-10-CM

## 2024-09-25 PROCEDURE — 76377 3D RENDER W/INTRP POSTPROCES: CPT | Mod: S$GLB,,, | Performed by: OBSTETRICS & GYNECOLOGY

## 2024-09-25 PROCEDURE — 76830 TRANSVAGINAL US NON-OB: CPT | Mod: S$GLB,,, | Performed by: OBSTETRICS & GYNECOLOGY

## 2024-09-27 ENCOUNTER — LAB VISIT (OUTPATIENT)
Dept: LAB | Facility: HOSPITAL | Age: 34
End: 2024-09-27
Attending: OBSTETRICS & GYNECOLOGY
Payer: COMMERCIAL

## 2024-09-27 DIAGNOSIS — N96 RECURRENT PREGNANCY LOSS: ICD-10-CM

## 2024-09-27 PROCEDURE — 36415 COLL VENOUS BLD VENIPUNCTURE: CPT | Mod: PO | Performed by: OBSTETRICS & GYNECOLOGY

## 2024-09-27 PROCEDURE — 88237 TISSUE CULTURE BONE MARROW: CPT | Performed by: OBSTETRICS & GYNECOLOGY

## 2024-09-30 LAB
CHROM BANDING METHOD: NORMAL
CHROMOSOME ANALYSIS BL RELEASED BY: NORMAL
CHROMOSOME ANALYSIS BL RESULT SUMMARY: NORMAL
CLINICAL CYTOGENETICIST REVIEW: NORMAL
KARYOTYP BLD/T: NORMAL
KARYOTYP BLD/T: NORMAL
REASON FOR REFERRAL, CHROMOSOME BLOOD: NORMAL
REF LAB TEST METHOD: NORMAL
SPECIMEN SOURCE: NORMAL
SPECIMEN TYPE, CHROMOSOME BLOOD: NORMAL

## 2024-10-01 LAB — REASON FOR REFERRAL, CHROMOSOME ANALYSIS CONGENITAL: NORMAL

## 2024-10-16 ENCOUNTER — PATIENT MESSAGE (OUTPATIENT)
Dept: OBSTETRICS AND GYNECOLOGY | Facility: CLINIC | Age: 34
End: 2024-10-16
Payer: COMMERCIAL

## 2024-10-16 ENCOUNTER — TELEPHONE (OUTPATIENT)
Dept: OBSTETRICS AND GYNECOLOGY | Facility: CLINIC | Age: 34
End: 2024-10-16
Payer: COMMERCIAL

## 2024-10-16 DIAGNOSIS — O26.21 RECURRENT PREGNANCY LOSS IN PREGNANT PATIENT IN FIRST TRIMESTER, ANTEPARTUM: Primary | ICD-10-CM

## 2024-10-17 RX ORDER — PROGESTERONE 200 MG/1
200 CAPSULE ORAL NIGHTLY
Qty: 30 CAPSULE | Refills: 1 | Status: SHIPPED | OUTPATIENT
Start: 2024-10-17 | End: 2025-10-17

## 2024-10-21 ENCOUNTER — CLINICAL SUPPORT (OUTPATIENT)
Dept: OBSTETRICS AND GYNECOLOGY | Facility: CLINIC | Age: 34
End: 2024-10-21
Payer: COMMERCIAL

## 2024-10-21 DIAGNOSIS — N91.2 AMENORRHEA: Primary | ICD-10-CM

## 2024-11-04 ENCOUNTER — HOSPITAL ENCOUNTER (OUTPATIENT)
Dept: PERINATAL CARE | Facility: OTHER | Age: 34
Discharge: HOME | End: 2024-11-04
Attending: OBSTETRICS & GYNECOLOGY
Payer: COMMERCIAL

## 2024-11-04 ENCOUNTER — OFFICE VISIT (OUTPATIENT)
Dept: OBSTETRICS AND GYNECOLOGY | Facility: CLINIC | Age: 34
End: 2024-11-04
Payer: COMMERCIAL

## 2024-11-04 VITALS
BODY MASS INDEX: 24.62 KG/M2 | WEIGHT: 144.19 LBS | DIASTOLIC BLOOD PRESSURE: 72 MMHG | HEIGHT: 64 IN | SYSTOLIC BLOOD PRESSURE: 102 MMHG

## 2024-11-04 DIAGNOSIS — N91.4 SECONDARY OLIGOMENORRHEA: ICD-10-CM

## 2024-11-04 DIAGNOSIS — N91.2 AMENORRHEA: ICD-10-CM

## 2024-11-04 DIAGNOSIS — Z32.01 POSITIVE PREGNANCY TEST: Primary | ICD-10-CM

## 2024-11-04 PROCEDURE — 1159F MED LIST DOCD IN RCRD: CPT | Mod: CPTII,S$GLB,, | Performed by: FAMILY MEDICINE

## 2024-11-04 PROCEDURE — 99999 PR PBB SHADOW E&M-EST. PATIENT-LVL III: CPT | Mod: PBBFAC,,, | Performed by: FAMILY MEDICINE

## 2024-11-04 PROCEDURE — 76801 OB US < 14 WKS SINGLE FETUS: CPT | Mod: 26,,, | Performed by: OBSTETRICS & GYNECOLOGY

## 2024-11-04 PROCEDURE — 87591 N.GONORRHOEAE DNA AMP PROB: CPT | Performed by: FAMILY MEDICINE

## 2024-11-04 PROCEDURE — 87086 URINE CULTURE/COLONY COUNT: CPT | Performed by: FAMILY MEDICINE

## 2024-11-04 PROCEDURE — 3074F SYST BP LT 130 MM HG: CPT | Mod: CPTII,S$GLB,, | Performed by: FAMILY MEDICINE

## 2024-11-04 PROCEDURE — 3044F HG A1C LEVEL LT 7.0%: CPT | Mod: CPTII,S$GLB,, | Performed by: FAMILY MEDICINE

## 2024-11-04 PROCEDURE — 99213 OFFICE O/P EST LOW 20 MIN: CPT | Mod: S$GLB,,, | Performed by: FAMILY MEDICINE

## 2024-11-04 PROCEDURE — 76801 OB US < 14 WKS SINGLE FETUS: CPT

## 2024-11-04 PROCEDURE — 3078F DIAST BP <80 MM HG: CPT | Mod: CPTII,S$GLB,, | Performed by: FAMILY MEDICINE

## 2024-11-04 PROCEDURE — 1160F RVW MEDS BY RX/DR IN RCRD: CPT | Mod: CPTII,S$GLB,, | Performed by: FAMILY MEDICINE

## 2024-11-04 NOTE — PATIENT INSTRUCTIONS
LABOR AND DELIVERY PHONE NUMBER, [phone] (OPEN , LOCATED ON 6TH FLOOR OF HOSPITAL)  [address] PHONE NUMBER, [phone] (OPEN MON-FRI, 8a-5p)     1) Eat small frequent meals through the day versus three large meals  2) Try ginger ale or sprite to help settle the stomach - you can also take ginger capsules (250mg 4 times per day)  3) Eat crackers or dry toast before getting out of bed in the morning   4) Stay hydrated by drinking plenty of water-do not immediately eat or drink something after vomiting. Give your stomach a rest for 20-30 minutes. Slowly reintroduce ice chips, small sips water, crackers, etc.    5) Try OTC unisom (1/2 tablet) and vitamin B6 - take the 25mg b6 twice a day and then both together at night before bed. This can help with the nausea in the morning and is safe to use during pregnancy.  6) Sea bands (Accupressure wrist bands)    If you are unable to keep anything down and constantly vomiting for more that 24 hours, let the office know so that dehydration can be avoided. We would recommend being seen in the emergency department if this is the case.       Topic  General Pregnancy Information Recommended   (Unless Otherwise Contraindicated Or Restrictions Given To You By Your OB Doctor)      1. Anticipated course of prenatal care      Visits: will be Every 4 wks until 28 weeks, then every 2 weeks until 36 weeks, and then weekly until delivery.   Urine will be collected at each Obstetric visit        2. Nutrition and weight gain    Daily pre-vince vitamin (recommend taking at night)   Additional 300 calories needed daily  No Sushi, hotdogs, unpasteurized products (milk/cheeses). No large fish such as: shark, ember mackerel, tile, sword fish   Incorporate 12 ounces of smaller seafood/week and no more than 6oz of albacore tuna   Caffeine: 200 mg/day or 2 cups of caffeine/day   Weight gain recommendations are based off of BMI before pregnancy. Generally patients who with normal weight prior  pregnancy it is recommended 25-35 pounds of weight gain during the pregnancy with an estimated weekly gain of 1 pound/wk in 2nd and 3rd Trimester.    3. Toxoplasmosis precautions  If cats are in the home avoid changing litter boxes and if you need to change the litter box recommended you use gloves   4. Sexual Activity  Sexual activity is okay unless you are put on restrictions by your provider. I recommend urinating after intercourse    5. Exercise  Generally pre-pregnancy routine is okay to continue   Drink plenty fluids for hydration   Stop any activity that causes heavy cramping like a period or bright red bleeding and contact your provider  No extreme or contact sports   No exercise on your back for an extended period of time after 20 weeks    6. Hot Tub/Saunas  Avoid hot tubes and saunas    7. Hair Treatments  Because of the lack of scientific studies on the effects of chemical treatments on your hair, we must advise that you do it at your own risk. If you choose to treat your hair, we recommend that you wait until after 12 weeks gestation. At this time there is no reason to believe that normal hair treatment is associated with onsequences to the baby.    8. Vaccines  Influenza vaccine is recommended by CDC during flu season   Tdap (pertussis or whopping cough) recommended each pregnancy between 27 and 36 weeks   Tdap booster recommended for family and other planned direct caregivers    9. Water  Water is an important nutrient in a good diet. However, it cannot be stressed enough that during pregnancy water is essential. The body has increased circulation through blood vessels, and without a large increase in water, pregnant women will be dehydrated. It plays an important role in decreasing constipation, preventing  contractions, decreasing swelling, and preventing dizziness. We recommend that you drink 8-10 glasses of water per day.    10. Smoking/Alcohol/Illicit Drug Use  No safe Level   Can lead to  problems with pregnancy   Growth of the developing fetus    labor (delivery before 37 weeks)    rupture of the membranes (water breaking before 37 weeks)   Premature separation of the placenta (which may cause bleeding)   American College of Obstetricians and Gynecologists endorses abstinence   Can lead to babies with disabilities    11. Environmental or work hazards  Unless otherwise restricted you may continue work throughout the pregnancy   Notify your provider of any work hazards or chemical exposure concerns   12. Travel    Safe to travel up to 35 weeks   Continue to wear a seatbelt and airbags are still recommended   Drink plenty fluids   Blood clots are a concern during pregnancy with long travel. Recommend compression stockings and moving around at least every 2 hours and staying hydrated.    13. Use of medications, vitamins, herbs, OTC drugs    Any medications not on the list provided to you from our clinic or given to you by one of our providers we recommend calling to make sure the medication is safe for you and baby.    14. Domestic Violence    Please notify office immediately of any concerns or violence so that we can help direct you to assistance needed   [place] [employer]: [phone]    15. Childbirth classes    List of Childbirth classes from [facility] is available    16. Selecting a Pediatrician  Selecting a pediatrician before delivery is recommended  You can interview pediatricians before delivery    17. Fetal Monitoring    A simple test of your babys well-being is a kick count. After 26 weeks, fetal motion of any kind should be monitored. Further discussion at that time   18.  Labor Signs    Water break, leaking fluids from Vagina prior 37 weeks  Regular contractions, Contractions that are more than 5-6/hour, getting stronger and painful with lower back pain, does not go away with rest and fluids    19. Postpartum Family Planning    Multiple  options available from short term methods to long term reversible and irreversible methods   Discuss with provider as you get closer to delivery    20. Dental    It is recommended that you get an annual dental cleaning    21. Breastfeeding    Classes offered at [facility] and it is recommended to take a class    22. Lifting In 2013, the National [place] for Occupational Safety and Health (NIOSH) published clinical guidelines for occupational lifting in uncomplicated pregnancies. The recommended weight limits are based on gestational age, intermittent versus repetitive lifting, time (hours/day) spent lifting, and lifting height from floor and distance in 3 front of body. In this guideline, the maximum permissible weight for a woman less than 20 weeks of gestation performing infrequent lifting is 36 pounds (16 kgs) and the maximum permissible weight at >=20 weeks is 26 pounds (12 kgs). For repetitive lifting >=1 hour/day, the maximum weights in the first and second half of pregnancy are 18 pounds (8 kgs) and 13 pounds (6 kgs), respectively, and for repetitive lifting <1 hour/day, the maximum weights are 30 pounds (14 kgs) and 22 pounds (10 kgs), respectively. Although not based on high quality evidence, these guidelines are a reasonable reference for counseling pregnant women     23. Scheduling and Provider Availability    Your Obstetric Doctor is usually here weekly but not every day. We recommend you make 3-4 advanced appointments at a time to accommodate your personal needs and work/school obligations.   We ask that you come 15 minutes prior your scheduled appointment.   For same day appointments (not routine appointments) there is a Nurse Practitioner or another obstetric provider available. Please let the  aware you are an OB patient requesting a same day appointment.      24. Recommended Phone Ar[facility]      MEDICATIONS IN PREGNANCY     While some medications are considered safe to take  during pregnancy, the effects of other medications on your unborn baby are unknown. Therefore, it is very important to pay special attention to medications you take while you are pregnant.   If you were taking prescription medications before you became pregnant, please ask your health care provider about continuing these medications as soon as you find out that you are pregnant. Do not stop taking any medications without discussing with your health care provider. Your health care provider will weigh the benefits and risks to your pregnancy when making his or her recommendation. With some medications, the risk of not taking them may be more serious than the potential risk of taking them.   If you are prescribed any new medication, please inform your health care provider that you are pregnant. Be sure to discuss the risks and benefits of the newly prescribed medication with your health care provider before taking the medication. We are always available to answer any questions about new medications and how they relate to your pregnancy.     Are Alternative Pregnancy Medicine Therapies Safe?   Many pregnant women believe natural products can be safely used to relieve nausea, backache, and other annoying symptoms of pregnancy, but many of these so-called natural products have not been tested for their safety and effectiveness. Therefore, it is very important to check with your health care provider before taking any alternative therapies. She will not recommend a product or therapy until it is shown to be safe and effective.     Which Over the Counter Drugs Are Safe?   Prenatal vitamins, now available without a prescription, are safe and important to take during pregnancy. Ask your health care provider about the safety of taking other vitamins, herbal remedies and supplements during pregnancy. Most herbal preparations and supplements have not been proven to be safe during pregnancy. Generally, you should not take any  over-the-counter medication unless it is necessary. The following medications and home remedies have no known harmful effects during pregnancy when taken according to the package directions. If you want to know about the safety of any other medications not listed here, please contact your health care provider.      Problem:  Safe to Take:    Pain relief, headache, and fever  Acetaminophen - Tylenol, Anacin Aspirin-Free    Heartburn  Acid neutralizers - Maalox, Mylanta, Rolaids, Tums, Gaviscon   Histamine-blockers - Pepcid, Zantac, Prilosec    Gas pains and bloating  Simethicone - Gas-X, Maalox Anti-Gas, Mylanta Gas, Mylicon    Nausea  Kailyn - beverages, tablets, candies   Vitamin B6   Emetrol (if not diabetic)   Sea bands   Anti-histamines - Sleep-lorenzo, Benadryl, Bonnine, Dramamine    Cough  Guaifenesin (expectorant) - Hytuss, Mucinex, Robitussin   Dextromethorphan (antitussive) - Benylin, Delsym, Scot-Tussin DM   Guaifenesin plus dextromethorphan - Benylin Expectorant, Robitussin DM    Congestion  Pseudoephedrine - Sudafed, Actifed, Dristan, Neosynephrine   Vicks VapoRub   Saline nasal drops or spray    Sore throat  Throat lozenges - Sucrets, Cepacol, Cepastat, Ricola   Chloroseptic Spray   Warm salt/water gargle    Allergy relief  Chlorpheniramine - Chlor-Trimeton, Triaminic   Loratadine - Alavert, Claritin, Tavist ND, Triaminic Allerchews   Cetirizine - Zyrtec   Diphenhydramine -Benadryl, Diphenhist    Rashes  Hydrocortisone cream or ointment   Caladryl lotion or cream   Benadryl cream   Oatmeal bath (Aveeno)    Diarrhea  Loperamide - Imodium, Kaopectate, Maalox Anti-Diarrheal, Pepto Bismol    Constipation  Fiber supplements - Metamucil, Citrucel, Fiberall/Fibercon, Benefiber   Stool softeners - Colace, Senekot, Dulcolax   Milk of Magnesia    Hemorrhoids  Warm baths   Witch hazel preparations - Tucks medicated pads   Steroid preparations - Anusol-HC, Preparation H    Insomnia  Diphenhydramine - Benadryl, Unisom  SleepGels, Nytol, Sominex   Doxylamine succinate - Unisom Nighttime Sleep-Aid    First-aid ointments  Cortaid, Lanacort, Polysporin, Bacitracin, Neosporin    Yeast infections  Call office for appointment      Connected MOM   Using Wireless Technology to Manage Your Prenatal Health   Congratulations! Your team here at [facility] is excited for the upcoming addition to your family and is ready to support you over the course of your pregnancy. One of the ways we are prepared to help you is through our exciting new Digital Medicine Program, Connected MOM. Connected MOM stands for Connected Maternity Online Monitoring and is offered free of charge as a way to help our expectant mothers manage their pregnancy with fewer visits to the obstetrician.    In the fast-paced environment we live in, patients demand convenient, reliable access to healthcare at their fingertips. Recommended by The American College of Obstetricians and Gynecologists (ACOG), the standard prenatal care model for low-risk pregnancies can average anywhere between 12-14 in-office prenatal visits.    Through this innovative program, participating mothers-to-be receive a wireless scale, wireless blood pressure cuff and an at-home urine protein test kit. Through the use of a smartphone, patients can easily send their weight, blood pressure readings and urine protein test results digitally in real-time from the comfort of their own homes. Results are sent directly to their [facility] account, the systems online patient portal which connects directly to the patients Electronic Medical Record. A specialized Connected MOM care team - comprised of the patients obstetrician, a dedicated health  and a  - reviews the data and provides medical recommendations as needed. This allows expectant mothers to receive care proactively, wherever they are, while minimizing the need for in-person visits and thus minimizing  disruption to their regular daily activities.    How it Works At the initial prenatal visit, interested patients can work with their obstetrician to sign up for the program. After the appointment, expectant mothers can head to the [employer], a first-of-its-kind retail experience created by [facility] offering the latest in cutting-edge, interactive healthcare technology, to receive a Connected MOM kit containing all of the digital tools needed for remote monitoring. Once enrolled, patients weigh themselves regularly and take their blood pressure once a week. Instead of coming to three office appointments at weeks 20, 30, 37 the patient will have the appointment at home. They will take their blood pressure, weight and perform three at-home urine protein tests at these home visits. Results are directly transmitted into the EMR, and notifications and messages from the care team are communicated via [facility].     TECH SUPPORT [phone]  [email or web address]      Chromosomal testing  The sequential screen is a test done around 11-13 weeks that consists of an ultrasound that measures the nuchal fold (neck thickness) of baby and blood work on the same day. You get a second set of labs done a few weeks later after 15 weeks. Based on all three of these results, they are able to tell you if you are considered to be low risk or high risk regarding neural tube defects and chromosomal abnormalities like Down Syndrome. If you are at all interested, I usually place the order today so we do not miss the window. Someone will give you a phone call in a week or two to schedule this. If you do not want it, just tell them no thank you. This test is typically covered by your insurance and is performed by the Maternal Fetal Medicine (MFM) department here at [facility]. It will not tell you the sex of the baby.     OR     2.  Call [phone] to see about coverage and any out of pocket costs regarding the Yvxikvlag20 (MT21) testing.  This is done any day after 10 weeks and is blood work only. It checks for any chromosomal abnormalities like Down Syndrome. You can also find out the sex of the baby if you choose to know. Once you find out coverage and decide to proceed, send either myself or your doctor a message and we can see what date you can do it. It is done at our second floor lab at [facility].

## 2024-11-04 NOTE — PROGRESS NOTES
HISTORY OF PRESENT ILLNESS:    Shira Ashton is a 34 y.o. female, ,  Patient's last menstrual period was 2024.  for a routine exam complaining of amenorrhea and positive home UPT. 2 prior SABs. Pt and partner healthy. He has no other children. Light spotting over the weekend that resolved. Mild cramping. O POS. No other pelvic pain. No NV but having some food aversions. LEEP , no std hx. Works from home.  This is the extent of the patient's complaints at this time.     Past Medical History:   Diagnosis Date    Abnormal Pap smear of cervix     GERD (gastroesophageal reflux disease)     Vocal nodules in adults        Past Surgical History:   Procedure Laterality Date    COLONOSCOPY N/A 2023    Procedure: COLONOSCOPY;  Surgeon: Melissa Cabrera MD;  Location: [facility];  Service: Endoscopy;  Laterality: N/A;  needs office/ppm    WISDOM TOOTH EXTRACTION         MEDICATIONS AND ALLERGIES:      Current Outpatient Medications:     PNV no.95/ferrous fum/folic ac (PRENATAL ORAL), Take by mouth., Disp: , Rfl:     progesterone (PROMETRIUM) 200 MG capsule, Take 1 capsule (200 mg total) by mouth nightly., Disp: 30 capsule, Rfl: 1    Review of patient's allergies indicates:  No Known Allergies    Family History   Problem Relation Name Age of Onset    Breast cancer Maternal Grandmother      Colon cancer Father  41    Diabetes Father      Hypertension Father      Coronary artery disease Father          bypass    Psoriasis Father          psoriatic arthritis     Cancer Father      Breast cancer Maternal Aunt      Diabetes Paternal Aunt      Coronary artery disease Paternal Aunt      Heart attacks under age 50 Paternal Aunt      Diverticulitis Mother      Eclampsia Neg Hx      Stroke Neg Hx      Miscarriages / Stillbirths Neg Hx      Ovarian cancer Neg Hx       labor Neg Hx         Social History     Socioeconomic History    Marital status:    Occupational History    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  Infection History: Denies STDs. Denies PID.  Benign History: Denies uterine fibroids. Denies ovarian cysts. Denies endometriosis. Denies other conditions.  Cancer History: Denies cervical cancer. Denies uterine cancer or hyperplasia. Denies ovarian cancer. Denies vulvar cancer or pre-cancer. Denies vaginal cancer or pre-cancer. Denies breast cancer. Denies colon cancer.  Sexual Activity History: Reports currently being sexually active  Menstrual History: None.  Contraception: None    ROS:  GENERAL: No weight changes. No swelling. No fatigue. No fever.  CARDIOVASCULAR: No chest pain. No shortness of breath. No leg cramps.   NEUROLOGICAL: No headaches. No vision changes.  BREASTS: No pain. No lumps. No discharge.  ABDOMEN: No pain. No nausea. No vomiting. No diarrhea. No constipation.  REPRODUCTIVE: spotting once, mild pelvic cramping  VULVA: No pain. No lesions. No itching.  VAGINA: No relaxation. No itching. No odor. No discharge. No lesions.  URINARY: No incontinence. No nocturia. No frequency. No dysuria.    /72 (BP Location: Left arm, Patient Position: Sitting)   Ht 5' 4" (1.626 m)   Wt 65.4 kg (144 lb 2.9 oz)   LMP 09/06/2024   Breastfeeding No   BMI 24.75 kg/m²     PE:  Physical Exam:   Constitutional: She appears well-developed and well-nourished. She does not appear ill. No distress.        Pulmonary/Chest: Right breast exhibits no inverted nipple, no " mass, no nipple discharge, no skin change, no tenderness and no swelling. Left breast exhibits no inverted nipple, no mass, no nipple discharge, no skin change, no tenderness and no swelling.          Genitourinary:    Urethra, vagina, uterus, right adnexa and left adnexa normal.      Pelvic exam was performed with patient in the lithotomy position.   The external female genitalia was normal.   Genitalia hair distrobution normal .   There is no rash or lesion on the right labia. There is no rash or lesion on the left labia. Cervix is normal. No rectocele, cystocele or prolapse of vaginal walls in the vagina.    pap smear not completedUterus is not tender. Normal urethral meatus.              Neurological: GCS eye subscore is 4. GCS verbal subscore is 5. GCS motor subscore is 6.         PROCEDURES:  UPT Positive  Genprobe  Pap-  NL      DIAGNOSIS:  Gyn exam  IUP with stated LMP of Patient's last menstrual period was 2024.  ndication   ========   Indication: Estimation of Gestational Age   Indication: Hx of LEEP, Currently Pregnant     History   ======   Previous Outcomes    3   Para 0   Miscarriages 3   Risk Factors   History risk factors: Hx LEEP / CKC     Method   ======   Transabdominal and transvaginal ultrasound examination, [place]on S8. View: Good view     Pregnancy   =========   Gallardo pregnancy. Number of embryos: 1     Dating   ======   GA by prior assessment 8 w + 2 d   DESIREE by prior assessment: 2025   Assigned: based on stated DESIREE, selected on 2024   Assigned GA 8 w + 2 d   Assigned DESIREE: 2025     Assessment   ==========   Gestational sac: visualized   Location: intrauterine   Yolk sac: visualized   Amniotic sac: visualized   Embryo: visualized   CRL 18.0 mm 8w 2d Hadlock   Cardiac activity: present    bpm     Maternal Structures   ===============   Uterus / Cervix   Uterus: Normal   Cervix: Visualized   Approach: Transvaginal   Ovaries / Tubes / Adnexa   Rt ovary:  Normal   Rt ovary D1 42 mm   Rt ovary D2 29 mm   Rt ovary D3 20 mm   Rt ovary Vol 12.6 cmï¿½   Rt ovarian corpus luteum: visualized   Lt ovary: Normal   Lt ovary D1 26 mm   Lt ovary D2 14 mm   Lt ovary D3 26 mm   Lt ovary Vol 5.0 cmï¿½   Cul de Sac / Bladder / Kidneys / Other   Free fluid: No free fluid visualized     Impression   =========   A burger IUP with cardiac activity is identified.   DESIREE is assigned based on the CRL from today?s study. If other clinical data, such as IVF conception dating or prior ultrasound assignment of DESIREE differs from the DESIREE   assigned today, please contact the [facility] department so that this report can be revised to reflect the correct DESIREE.   Visualized maternal adnexa - normal in appearance.     Recommendation   PLAN:Routine prenatal care    MEDICATIONS PRESCRIBED:  PNV    LABS AND TESTS ORDERED:  New Ob Labs      1st TRIMESTER COUNSELING: Discussed all, booklet provided  Common complaints of pregnancy  HIV and other routine prenatal tests including  genetic screening  Risk factors identified by prenatal history  Anticipated course of prenatal care  Nutrition and weight gain counseling  Toxoplasmosis precautions (Cats/Raw Meat)  Sexual activity and exercise  Environmental/Work hazards  Travel  Tobacco (Ask, Advise, Assess, Assist, and Arrange), as well as alcohol and drug use  Use of any medications (Including supplements, Vitamins, Herbs, or OTC Drugs)  Indications for Ultrasound  Domestic violence  Seat belt use  Childbirth classes/Hospital facilities     TERATOLOGY COUNSELING: Discussed options for SS, MT21 and carrier screening. Pt will let us know her desires. Discussed time constraints on SS      FOLLOW-UP for a New Ob Visit in   4   weeks with Dr. Fong  -discussed to call clinic or L&D/ER if after hours for pain/bleeding

## 2024-11-05 ENCOUNTER — PATIENT MESSAGE (OUTPATIENT)
Dept: OBSTETRICS AND GYNECOLOGY | Facility: CLINIC | Age: 34
End: 2024-11-05
Payer: COMMERCIAL

## 2024-11-05 LAB
C TRACH DNA SPEC QL NAA+PROBE: NOT DETECTED
N GONORRHOEA DNA SPEC QL NAA+PROBE: NOT DETECTED

## 2024-11-06 LAB — BACTERIA UR CULT: NORMAL

## 2024-12-03 ENCOUNTER — LAB VISIT (OUTPATIENT)
Dept: LAB | Facility: OTHER | Age: 34
End: 2024-12-03
Attending: OBSTETRICS & GYNECOLOGY
Payer: COMMERCIAL

## 2024-12-03 ENCOUNTER — INITIAL PRENATAL (OUTPATIENT)
Dept: OBSTETRICS AND GYNECOLOGY | Facility: CLINIC | Age: 34
End: 2024-12-03
Payer: COMMERCIAL

## 2024-12-03 VITALS
BODY MASS INDEX: 25.35 KG/M2 | DIASTOLIC BLOOD PRESSURE: 60 MMHG | SYSTOLIC BLOOD PRESSURE: 110 MMHG | WEIGHT: 147.69 LBS

## 2024-12-03 DIAGNOSIS — Z34.91 NORMAL PREGNANCY IN FIRST TRIMESTER: ICD-10-CM

## 2024-12-03 DIAGNOSIS — Z34.91 NORMAL PREGNANCY IN FIRST TRIMESTER: Primary | ICD-10-CM

## 2024-12-03 DIAGNOSIS — Z32.01 POSITIVE PREGNANCY TEST: ICD-10-CM

## 2024-12-03 PROBLEM — N91.2 AMENORRHEA: Status: RESOLVED | Noted: 2024-11-04 | Resolved: 2024-12-03

## 2024-12-03 LAB
ABO + RH BLD: NORMAL
BASOPHILS # BLD AUTO: 0.03 K/UL (ref 0–0.2)
BASOPHILS NFR BLD: 0.4 % (ref 0–1.9)
BLD GP AB SCN CELLS X3 SERPL QL: NORMAL
DIFFERENTIAL METHOD BLD: ABNORMAL
EOSINOPHIL # BLD AUTO: 0 K/UL (ref 0–0.5)
EOSINOPHIL NFR BLD: 0.6 % (ref 0–8)
ERYTHROCYTE [DISTWIDTH] IN BLOOD BY AUTOMATED COUNT: 11.7 % (ref 11.5–14.5)
HBV SURFACE AG SERPL QL IA: NORMAL
HCT VFR BLD AUTO: 36.1 % (ref 37–48.5)
HCV AB SERPL QL IA: NEGATIVE
HGB BLD-MCNC: 12.4 G/DL (ref 12–16)
HIV 1+2 AB+HIV1 P24 AG SERPL QL IA: NEGATIVE
IMM GRANULOCYTES # BLD AUTO: 0.02 K/UL (ref 0–0.04)
IMM GRANULOCYTES NFR BLD AUTO: 0.3 % (ref 0–0.5)
LYMPHOCYTES # BLD AUTO: 1.7 K/UL (ref 1–4.8)
LYMPHOCYTES NFR BLD: 23.5 % (ref 18–48)
MCH RBC QN AUTO: 33.5 PG (ref 27–31)
MCHC RBC AUTO-ENTMCNC: 34.3 G/DL (ref 32–36)
MCV RBC AUTO: 98 FL (ref 82–98)
MONOCYTES # BLD AUTO: 0.4 K/UL (ref 0.3–1)
MONOCYTES NFR BLD: 5.9 % (ref 4–15)
NEUTROPHILS # BLD AUTO: 4.9 K/UL (ref 1.8–7.7)
NEUTROPHILS NFR BLD: 69.3 % (ref 38–73)
NRBC BLD-RTO: 0 /100 WBC
PLATELET # BLD AUTO: 251 K/UL (ref 150–450)
PMV BLD AUTO: 9.9 FL (ref 9.2–12.9)
RBC # BLD AUTO: 3.7 M/UL (ref 4–5.4)
SPECIMEN OUTDATE: NORMAL
TREPONEMA PALLIDUM IGG+IGM AB [PRESENCE] IN SERUM OR PLASMA BY IMMUNOASSAY: NONREACTIVE
WBC # BLD AUTO: 7.12 K/UL (ref 3.9–12.7)

## 2024-12-03 PROCEDURE — 85025 COMPLETE CBC W/AUTO DIFF WBC: CPT | Performed by: FAMILY MEDICINE

## 2024-12-03 PROCEDURE — 99999 PR PBB SHADOW E&M-EST. PATIENT-LVL III: CPT | Mod: PBBFAC,,, | Performed by: OBSTETRICS & GYNECOLOGY

## 2024-12-03 PROCEDURE — 87389 HIV-1 AG W/HIV-1&-2 AB AG IA: CPT | Performed by: FAMILY MEDICINE

## 2024-12-03 PROCEDURE — 86593 SYPHILIS TEST NON-TREP QUANT: CPT | Performed by: FAMILY MEDICINE

## 2024-12-03 PROCEDURE — 87340 HEPATITIS B SURFACE AG IA: CPT | Performed by: FAMILY MEDICINE

## 2024-12-03 PROCEDURE — 0500F INITIAL PRENATAL CARE VISIT: CPT | Mod: CPTII,S$GLB,, | Performed by: OBSTETRICS & GYNECOLOGY

## 2024-12-03 PROCEDURE — 86901 BLOOD TYPING SEROLOGIC RH(D): CPT | Performed by: FAMILY MEDICINE

## 2024-12-03 PROCEDURE — 86787 VARICELLA-ZOSTER ANTIBODY: CPT | Performed by: FAMILY MEDICINE

## 2024-12-03 PROCEDURE — 86803 HEPATITIS C AB TEST: CPT | Performed by: FAMILY MEDICINE

## 2024-12-03 PROCEDURE — 86762 RUBELLA ANTIBODY: CPT | Performed by: FAMILY MEDICINE

## 2024-12-03 NOTE — PROGRESS NOTES
First visit with me today.  Has a history of recurrent pregnancy loss and LEEP procedure (last two pap smears were normal).   No bleeding or cramping today.  Nausea vomiting is minimal.  Doing well.    Signed up for conn mom. Recommended flu shot.   Plans to get mat 21.  Thinking about CF and SMA testing.

## 2024-12-04 LAB
RUBV IGG SER-ACNC: 58.6 IU/ML
RUBV IGG SER-IMP: REACTIVE
VARICELLA INTERPRETATION: POSITIVE
VARICELLA ZOSTER IGG: 7.61 S/CO

## 2024-12-11 LAB
ABOUT THE TEST: NORMAL
APPROVED BY: NORMAL
FETAL FRACTION: NORMAL
FETAL SEX RESULT: NORMAL
GESTATIONAL AGE > 9: YES
GESTATIONAL AGE: NORMAL
LAB DIRECTOR COMMENTS: NORMAL
LIMITATIONS:: NORMAL
MONOSOMY X RESULT: NOT DETECTED
NEGATIVE PREDICTIVE VALUE: NORMAL
NOTE: NORMAL
PERFORMANCE CHARACTERISTICS: NORMAL
PERFORMANCE: NORMAL
POSITIVE PREDICTIVE VALUE: NORMAL
RESULT: NEGATIVE
SERVICE CMNT 04-IMP: NORMAL
TEST METHODOLOGY:: NORMAL
TRISOMY 13 (T13): NEGATIVE
TRISOMY 18: NEGATIVE
TRISOMY 21 (T21): NEGATIVE
XXX (TRIPLE X SYNDROME): NOT DETECTED
XXY (KLINEFELTER SYNDROME): NOT DETECTED
XYY (JACOBS SYNDROME): NOT DETECTED

## 2024-12-13 DIAGNOSIS — O26.21 RECURRENT PREGNANCY LOSS IN PREGNANT PATIENT IN FIRST TRIMESTER, ANTEPARTUM: ICD-10-CM

## 2024-12-13 RX ORDER — PROGESTERONE 200 MG/1
200 CAPSULE ORAL NIGHTLY
Qty: 30 CAPSULE | Refills: 1 | Status: SHIPPED | OUTPATIENT
Start: 2024-12-13

## 2024-12-13 NOTE — TELEPHONE ENCOUNTER
Refill Routing Note   Medication(s) are not appropriate for processing by [facility] for the following reason(s):        Outside of protocol    [facility] action(s):  Route               Appointments  past 12m or future 3m with PCP    Date Provider   Last Visit   12/3/2024 Kayce Fong MD   Next Visit   2/18/2025 Kayce Fong MD   ED visits in past 90 days: 0        Note composed:5:54 AM 12/13/2024

## 2024-12-18 ENCOUNTER — LAB VISIT (OUTPATIENT)
Dept: LAB | Facility: HOSPITAL | Age: 34
End: 2024-12-18
Attending: OBSTETRICS & GYNECOLOGY
Payer: COMMERCIAL

## 2024-12-18 DIAGNOSIS — Z13.228 SCREENING FOR CYSTIC FIBROSIS: ICD-10-CM

## 2024-12-18 DIAGNOSIS — Z34.91 NORMAL PREGNANCY IN FIRST TRIMESTER: ICD-10-CM

## 2024-12-18 PROCEDURE — 81220 CFTR GENE COM VARIANTS: CPT | Performed by: OBSTETRICS & GYNECOLOGY

## 2024-12-18 PROCEDURE — 36415 COLL VENOUS BLD VENIPUNCTURE: CPT | Performed by: OBSTETRICS & GYNECOLOGY

## 2024-12-24 LAB — CFTR MUT ANL BLD/T: NORMAL

## 2025-01-02 ENCOUNTER — PATIENT MESSAGE (OUTPATIENT)
Dept: OBSTETRICS AND GYNECOLOGY | Facility: CLINIC | Age: 35
End: 2025-01-02
Payer: COMMERCIAL

## 2025-01-02 DIAGNOSIS — Z36.89 ENCOUNTER FOR FETAL ANATOMIC SURVEY: Primary | ICD-10-CM

## 2025-01-04 ENCOUNTER — PATIENT MESSAGE (OUTPATIENT)
Dept: OTHER | Facility: OTHER | Age: 35
End: 2025-01-04
Payer: COMMERCIAL

## 2025-01-20 ENCOUNTER — PROCEDURE VISIT (OUTPATIENT)
Dept: MATERNAL FETAL MEDICINE | Facility: CLINIC | Age: 35
End: 2025-01-20
Payer: COMMERCIAL

## 2025-01-20 DIAGNOSIS — Z36.89 ENCOUNTER FOR FETAL ANATOMIC SURVEY: ICD-10-CM

## 2025-01-20 DIAGNOSIS — Z36.89 ENCOUNTER FOR ULTRASOUND TO CHECK FETAL GROWTH: Primary | ICD-10-CM

## 2025-01-20 PROCEDURE — 76817 TRANSVAGINAL US OBSTETRIC: CPT | Mod: S$GLB,,, | Performed by: OBSTETRICS & GYNECOLOGY

## 2025-01-20 PROCEDURE — 76805 OB US >/= 14 WKS SNGL FETUS: CPT | Mod: S$GLB,,, | Performed by: OBSTETRICS & GYNECOLOGY

## 2025-01-22 ENCOUNTER — OFFICE VISIT (OUTPATIENT)
Dept: OBSTETRICS AND GYNECOLOGY | Facility: CLINIC | Age: 35
End: 2025-01-22
Payer: COMMERCIAL

## 2025-01-22 DIAGNOSIS — Z3A.19 19 WEEKS GESTATION OF PREGNANCY: Primary | ICD-10-CM

## 2025-01-22 PROCEDURE — 0502F SUBSEQUENT PRENATAL CARE: CPT | Mod: CPTII,95,,

## 2025-01-22 PROCEDURE — 1160F RVW MEDS BY RX/DR IN RCRD: CPT | Mod: CPTII,95,,

## 2025-01-22 PROCEDURE — 1159F MED LIST DOCD IN RCRD: CPT | Mod: CPTII,95,,
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
## 2025-01-22 NOTE — PATIENT INSTRUCTIONS
LABOR AND DELIVERY PHONE NUMBER, [phone] (OPEN 24/7, LOCATED ON 6TH FLOOR OF HOSPITAL)  [address] PHONE NUMBER, [phone] (OPEN MON-FRI, 8a-5p)

## 2025-01-25 ENCOUNTER — PATIENT MESSAGE (OUTPATIENT)
Dept: OTHER | Facility: OTHER | Age: 35
End: 2025-01-25
Payer: COMMERCIAL

## 2025-02-05 ENCOUNTER — PATIENT MESSAGE (OUTPATIENT)
Dept: OBSTETRICS AND GYNECOLOGY | Facility: CLINIC | Age: 35
End: 2025-02-05
Payer: COMMERCIAL

## 2025-02-06 ENCOUNTER — ROUTINE PRENATAL (OUTPATIENT)
Dept: OBSTETRICS AND GYNECOLOGY | Facility: CLINIC | Age: 35
End: 2025-02-06
Payer: COMMERCIAL

## 2025-02-06 VITALS
DIASTOLIC BLOOD PRESSURE: 62 MMHG | SYSTOLIC BLOOD PRESSURE: 110 MMHG | BODY MASS INDEX: 25.58 KG/M2 | WEIGHT: 149.06 LBS

## 2025-02-06 DIAGNOSIS — Z34.82 ENCOUNTER FOR SUPERVISION OF OTHER NORMAL PREGNANCY IN SECOND TRIMESTER: Primary | ICD-10-CM

## 2025-02-06 PROCEDURE — 99999 PR PBB SHADOW E&M-EST. PATIENT-LVL III: CPT | Mod: PBBFAC,,, | Performed by: OBSTETRICS & GYNECOLOGY

## 2025-02-06 PROCEDURE — 0502F SUBSEQUENT PRENATAL CARE: CPT | Mod: CPTII,S$GLB,, | Performed by: OBSTETRICS & GYNECOLOGY

## 2025-02-06 NOTE — PROGRESS NOTES
No movement yet.  Coming in today for very light brown spotting for 2 days.  Has no pain.  FHT normal today.  Will keep visit in two weeks.

## 2025-02-18 ENCOUNTER — ROUTINE PRENATAL (OUTPATIENT)
Dept: OBSTETRICS AND GYNECOLOGY | Facility: CLINIC | Age: 35
End: 2025-02-18
Payer: COMMERCIAL

## 2025-02-18 VITALS
SYSTOLIC BLOOD PRESSURE: 112 MMHG | WEIGHT: 155.44 LBS | DIASTOLIC BLOOD PRESSURE: 66 MMHG | BODY MASS INDEX: 26.68 KG/M2

## 2025-02-18 DIAGNOSIS — Z34.82 ENCOUNTER FOR SUPERVISION OF OTHER NORMAL PREGNANCY IN SECOND TRIMESTER: Primary | ICD-10-CM

## 2025-02-18 NOTE — PROGRESS NOTES
Good fetal movement.  No contractions, no vaginal bleeding, and no loss of fluid.    Vaginal spotting from last visit has resolved.  Scheduled for glucose and cbc next visit.  Discussed tdap vaccine; patient is interested in avoiding an epidural if possible in labor. Plans to take some in person classes for preparation.

## 2025-02-22 ENCOUNTER — PATIENT MESSAGE (OUTPATIENT)
Dept: OTHER | Facility: OTHER | Age: 35
End: 2025-02-22
Payer: COMMERCIAL

## 2025-03-08 ENCOUNTER — PATIENT MESSAGE (OUTPATIENT)
Dept: OTHER | Facility: OTHER | Age: 35
End: 2025-03-08
Payer: COMMERCIAL

## 2025-03-20 ENCOUNTER — TELEPHONE (OUTPATIENT)
Dept: OBSTETRICS AND GYNECOLOGY | Facility: CLINIC | Age: 35
End: 2025-03-20
Payer: COMMERCIAL

## 2025-03-22 ENCOUNTER — PATIENT MESSAGE (OUTPATIENT)
Dept: OTHER | Facility: OTHER | Age: 35
End: 2025-03-22
Payer: COMMERCIAL

## 2025-03-25 ENCOUNTER — LAB VISIT (OUTPATIENT)
Dept: LAB | Facility: OTHER | Age: 35
End: 2025-03-25
Payer: COMMERCIAL

## 2025-03-25 ENCOUNTER — ROUTINE PRENATAL (OUTPATIENT)
Dept: OBSTETRICS AND GYNECOLOGY | Facility: CLINIC | Age: 35
End: 2025-03-25
Payer: COMMERCIAL

## 2025-03-25 ENCOUNTER — RESULTS FOLLOW-UP (OUTPATIENT)
Dept: OBSTETRICS AND GYNECOLOGY | Facility: CLINIC | Age: 35
End: 2025-03-25

## 2025-03-25 VITALS
SYSTOLIC BLOOD PRESSURE: 123 MMHG | WEIGHT: 157.88 LBS | DIASTOLIC BLOOD PRESSURE: 69 MMHG | BODY MASS INDEX: 27.09 KG/M2

## 2025-03-25 DIAGNOSIS — Z34.91 NORMAL PREGNANCY IN FIRST TRIMESTER: Primary | ICD-10-CM

## 2025-03-25 DIAGNOSIS — Z3A.19 19 WEEKS GESTATION OF PREGNANCY: ICD-10-CM

## 2025-03-25 LAB
ABSOLUTE EOSINOPHIL (OHS): 0.04 K/UL
ABSOLUTE MONOCYTE (OHS): 0.41 K/UL (ref 0.3–1)
ABSOLUTE NEUTROPHIL COUNT (OHS): 4.47 K/UL (ref 1.8–7.7)
BASOPHILS # BLD AUTO: 0.04 K/UL
BASOPHILS NFR BLD AUTO: 0.6 %
ERYTHROCYTE [DISTWIDTH] IN BLOOD BY AUTOMATED COUNT: 11.7 % (ref 11.5–14.5)
GLUCOSE SERPL-MCNC: 73 MG/DL (ref 70–140)
HCT VFR BLD AUTO: 33.6 % (ref 37–48.5)
HGB BLD-MCNC: 11.9 GM/DL (ref 12–16)
IMM GRANULOCYTES # BLD AUTO: 0.08 K/UL (ref 0–0.04)
IMM GRANULOCYTES NFR BLD AUTO: 1.2 % (ref 0–0.5)
LYMPHOCYTES # BLD AUTO: 1.63 K/UL (ref 1–4.8)
MCH RBC QN AUTO: 35.2 PG (ref 27–50)
MCHC RBC AUTO-ENTMCNC: 35.4 G/DL (ref 32–36)
MCV RBC AUTO: 99 FL (ref 82–98)
NUCLEATED RBC (/100WBC) (OHS): 0 /100 WBC
PLATELET # BLD AUTO: 217 K/UL (ref 150–450)
PMV BLD AUTO: 9.8 FL (ref 9.2–12.9)
RBC # BLD AUTO: 3.38 M/UL (ref 4–5.4)
RELATIVE EOSINOPHIL (OHS): 0.6 %
RELATIVE LYMPHOCYTE (OHS): 24.4 % (ref 18–48)
RELATIVE MONOCYTE (OHS): 6.1 % (ref 4–15)
RELATIVE NEUTROPHIL (OHS): 67.1 % (ref 38–73)
WBC # BLD AUTO: 6.67 K/UL (ref 3.9–12.7)

## 2025-03-25 PROCEDURE — 0502F SUBSEQUENT PRENATAL CARE: CPT | Mod: CPTII,S$GLB,, | Performed by: OBSTETRICS & GYNECOLOGY

## 2025-03-25 PROCEDURE — 99999 PR PBB SHADOW E&M-EST. PATIENT-LVL III: CPT | Mod: PBBFAC,,, | Performed by: OBSTETRICS & GYNECOLOGY

## 2025-03-25 PROCEDURE — 36415 COLL VENOUS BLD VENIPUNCTURE: CPT

## 2025-03-25 PROCEDURE — 82950 GLUCOSE TEST: CPT

## 2025-03-25 PROCEDURE — 85025 COMPLETE CBC W/AUTO DIFF WBC: CPT

## 2025-03-25 NOTE — PROGRESS NOTES
Good fetal movement.  No contractions, no vaginal bleeding, and no loss of fluid. Ob glucose and cbc today.  Tdap today.  Has follow up with MFM for placenta location.

## 2025-04-05 ENCOUNTER — PATIENT MESSAGE (OUTPATIENT)
Dept: OTHER | Facility: OTHER | Age: 35
End: 2025-04-05
Payer: COMMERCIAL

## 2025-04-08 ENCOUNTER — PATIENT MESSAGE (OUTPATIENT)
Dept: OBSTETRICS AND GYNECOLOGY | Facility: CLINIC | Age: 35
End: 2025-04-08
Payer: COMMERCIAL

## 2025-04-19 ENCOUNTER — PATIENT MESSAGE (OUTPATIENT)
Dept: OTHER | Facility: OTHER | Age: 35
End: 2025-04-19
Payer: COMMERCIAL

## 2025-04-22 ENCOUNTER — PROCEDURE VISIT (OUTPATIENT)
Dept: MATERNAL FETAL MEDICINE | Facility: CLINIC | Age: 35
End: 2025-04-22
Payer: COMMERCIAL

## 2025-04-22 ENCOUNTER — OFFICE VISIT (OUTPATIENT)
Dept: MATERNAL FETAL MEDICINE | Facility: CLINIC | Age: 35
End: 2025-04-22
Payer: COMMERCIAL

## 2025-04-22 ENCOUNTER — PATIENT MESSAGE (OUTPATIENT)
Dept: OBSTETRICS AND GYNECOLOGY | Facility: CLINIC | Age: 35
End: 2025-04-22

## 2025-04-22 ENCOUNTER — ROUTINE PRENATAL (OUTPATIENT)
Dept: OBSTETRICS AND GYNECOLOGY | Facility: CLINIC | Age: 35
End: 2025-04-22
Payer: COMMERCIAL

## 2025-04-22 VITALS — BODY MASS INDEX: 28.15 KG/M2 | DIASTOLIC BLOOD PRESSURE: 64 MMHG | SYSTOLIC BLOOD PRESSURE: 118 MMHG | WEIGHT: 164 LBS

## 2025-04-22 VITALS — DIASTOLIC BLOOD PRESSURE: 72 MMHG | SYSTOLIC BLOOD PRESSURE: 113 MMHG

## 2025-04-22 DIAGNOSIS — Z36.89 ENCOUNTER FOR ULTRASOUND TO CHECK FETAL GROWTH: ICD-10-CM

## 2025-04-22 DIAGNOSIS — O44.03 PLACENTA PREVIA IN THIRD TRIMESTER: ICD-10-CM

## 2025-04-22 DIAGNOSIS — H43.391 FLOATERS IN VISUAL FIELD, RIGHT: Primary | ICD-10-CM

## 2025-04-22 DIAGNOSIS — O34.43 HISTORY OF LOOP ELECTROSURGICAL EXCISION PROCEDURE (LEEP) OF CERVIX AFFECTING PREGNANCY IN THIRD TRIMESTER: ICD-10-CM

## 2025-04-22 DIAGNOSIS — O36.5990 FETAL GROWTH RESTRICTION ANTEPARTUM: Primary | ICD-10-CM

## 2025-04-22 DIAGNOSIS — Z98.890 HISTORY OF LOOP ELECTROSURGICAL EXCISION PROCEDURE (LEEP) OF CERVIX AFFECTING PREGNANCY IN THIRD TRIMESTER: ICD-10-CM

## 2025-04-22 DIAGNOSIS — O36.5931 POOR FETAL GROWTH AFFECTING MANAGEMENT OF MOTHER IN THIRD TRIMESTER, FETUS 1 OF MULTIPLE GESTATION: Primary | ICD-10-CM

## 2025-04-22 PROBLEM — Z34.93 NORMAL PREGNANCY IN THIRD TRIMESTER: Status: ACTIVE | Noted: 2024-12-03

## 2025-04-22 PROCEDURE — 76819 FETAL BIOPHYS PROFIL W/O NST: CPT | Mod: S$GLB,,, | Performed by: OBSTETRICS & GYNECOLOGY

## 2025-04-22 PROCEDURE — 99999 PR PBB SHADOW E&M-EST. PATIENT-LVL I: CPT | Mod: PBBFAC,,, | Performed by: OBSTETRICS & GYNECOLOGY

## 2025-04-22 PROCEDURE — 76820 UMBILICAL ARTERY ECHO: CPT | Mod: S$GLB,,, | Performed by: OBSTETRICS & GYNECOLOGY

## 2025-04-22 PROCEDURE — 99999 PR PBB SHADOW E&M-EST. PATIENT-LVL III: CPT | Mod: PBBFAC,,, | Performed by: OBSTETRICS & GYNECOLOGY

## 2025-04-22 PROCEDURE — 0502F SUBSEQUENT PRENATAL CARE: CPT | Mod: CPTII,S$GLB,, | Performed by: OBSTETRICS & GYNECOLOGY

## 2025-04-22 PROCEDURE — 99214 OFFICE O/P EST MOD 30 MIN: CPT | Mod: S$GLB,,, | Performed by: OBSTETRICS & GYNECOLOGY

## 2025-04-22 PROCEDURE — 76816 OB US FOLLOW-UP PER FETUS: CPT | Mod: S$GLB,,, | Performed by: OBSTETRICS & GYNECOLOGY

## 2025-04-22 NOTE — PROGRESS NOTES
MATERNAL-FETAL MEDICINE   CONSULT NOTE        SUBJECTIVE:   Ms. Shira Ashton is a 34 y.o.  female with IUP at 32w3d who is seen in consultation by MFM for evaluation and management of:  Problem   Placenta Previa in Third Trimester   Fetal Growth Restriction Antepartum            Current Medications[1]  Past Medical History[2], Past Surgical History[3]  Allergies, family and social history: see chart    OB History    Para Term  AB Living   3 0 0 0 2 0   SAB IAB Ectopic Multiple Live Births   2 0 0 0 0      # Outcome Date GA Lbr Madhu/2nd Weight Sex Type Anes PTL Lv   3 Current            2 SAB 2024 6w0d    SAB   FD   1 SAB 24               Genetic history: low risk cfDNA The patient denies any inherited genetic diseases or birth defects in herself or her partner's personal history or family.    Objective:   LMP 2024     Ultrasound performed. See viewpoint for full ultrasound report.  FGR is identified on today's study. The EFW plots at the 7%, and the AC plots at the 3%.   The EFW is 1481 g.  AFV is normal, and the BPP score is reassuring at 8/8.   Umbilical artery Doppler S/D ratios are normal.   Placenta previa has resolved, and color doppler interrogation of the lower uterine segment is not suspicious for vasa previa.         ASSESSMENT/PLAN:     34 y.o.  female with IUP at 32w3d     Assessment & Plan  Placenta previa in third trimester  Ultrasound today demonstrates resolution of placenta previa.  No evidence of vasa previa  Fetal growth restriction antepartum  The patients fetus has been diagnosed with Fetal Growth Restriction (FGR) based on an 7%EFW/AC less than the 10th percentile (3rd). Today on ultrasound, the EFW is 1481. The amniotic fluid volume is normal. UA dopplers are normal. Ultrasound demonstrated no abnormalities. The patient did have genetic screening - cfDNA.   Potential etiologies of FGR include, but are not limited to, normal variation of stature,  placental insufficiency, chromosomal abnormalities, genetic disorders, infections, medical conditions, teratogen exposure and other etiologies.   We discussed the increased risk of stillbirth and the potential need for earlier delivery.     Recommendations (Please refer to [facility] FGR guidelines):  The patient should have twice weekly  fetal surveillance with BPP or NST/MVP.   Weekly UA dopplers.   A follow up growth ultrasound in 3 weeks.   The patient is to perform fetal kick counts.   Monitor closely for any signs of evolving preeclampsia.  With respect to delivery planning, we recommend the followin 0/7 to 39 0/7 weeks - if EFW 3rd - <10th percentile, no co-morbidities exist and  surveillance is reassuring   37 0/7 weeks if any of the following:   EFW <3rd percentile   Umbilical artery S/D >95th percentile  Maternal co-morbidity (e.g. CHTN, DM, renal disease, autoimmune disease, AMA >40)   testing is non-reassuring   At 34 weeks  Oligohydramnios is diagnosed (unless GA less than 34; if less than 34 weeks, management needs to be individualized based on other testing and entire clinical scenario)  At 33 - 34 6/7 weeks, if:  Absent diastolic flow in the umbilical artery and there has not been a previous indication for delivery  At 30 - 32 6/7 weeks, if:   Reversed diastolic flow in the umbilical artery and there has not been a previous indication for delivery   For all pregnancies with FGR, the placenta should be sent to pathology for examination.        FOLLOW UP:   F/u in 3-4 weeks for US/MD visit  Begin twice weekly testing.    Bashir Guevara Jr., MD  Maternal Fetal Medicine         [1]   Current Outpatient Medications   Medication Sig Dispense Refill    PNV no.95/ferrous fum/folic ac (PRENATAL ORAL) Take by mouth.       No current facility-administered medications for this visit.   [2]   Past Medical History:  Diagnosis Date    Abnormal Pap smear of cervix     GERD  (gastroesophageal reflux disease)     Vocal nodules in adults    [3]   Past Surgical History:  Procedure Laterality Date    CERVICAL BIOPSY  W/ LOOP ELECTRODE EXCISION      2022    COLONOSCOPY N/A 08/11/2023    Procedure: COLONOSCOPY;  Surgeon: Melissa Cabrera MD;  Location: [facility];  Service: Endoscopy;  Laterality: N/A;  needs office/ppm    WISDOM TOOTH EXTRACTION

## 2025-04-22 NOTE — PROGRESS NOTES
Good fetal movement.  No contractions, no vaginal bleeding, and no loss of fluid.    Patient is having visual changes with exercise in the right eye only.  Reports a black line that moves medial to lateral.  No headaches, no changes in her left eye.  BP is normal.  Will refer to ophthalmology.   Complete previa- has repeat ultrasound today.    Discussed vaccinations for babies; patient has received tdap.  Counseled about RSV vaccine.

## 2025-04-22 NOTE — ASSESSMENT & PLAN NOTE
The patients fetus has been diagnosed with Fetal Growth Restriction (FGR) based on an 7%EFW/AC less than the 10th percentile (3rd). Today on ultrasound, the EFW is 1481. The amniotic fluid volume is normal. UA dopplers are normal. Ultrasound demonstrated no abnormalities. The patient did have genetic screening - cfDNA.   Potential etiologies of FGR include, but are not limited to, normal variation of stature, placental insufficiency, chromosomal abnormalities, genetic disorders, infections, medical conditions, teratogen exposure and other etiologies.   We discussed the increased risk of stillbirth and the potential need for earlier delivery.     Recommendations (Please refer to [facility] FGR guidelines):  The patient should have twice weekly  fetal surveillance with BPP or NST/MVP.   Weekly UA dopplers.   A follow up growth ultrasound in 3 weeks.   The patient is to perform fetal kick counts.   Monitor closely for any signs of evolving preeclampsia.  With respect to delivery planning, we recommend the followin 0/7 to 39 0/7 weeks - if EFW 3rd - <10th percentile, no co-morbidities exist and  surveillance is reassuring   37 0/7 weeks if any of the following:   EFW <3rd percentile   Umbilical artery S/D >95th percentile  Maternal co-morbidity (e.g. CHTN, DM, renal disease, autoimmune disease, AMA >40)   testing is non-reassuring   At 34 weeks  Oligohydramnios is diagnosed (unless GA less than 34; if less than 34 weeks, management needs to be individualized based on other testing and entire clinical scenario)  At 33 - 34 6/7 weeks, if:  Absent diastolic flow in the umbilical artery and there has not been a previous indication for delivery  At 30 - 32 6/7 weeks, if:   Reversed diastolic flow in the umbilical artery and there has not been a previous indication for delivery   For all pregnancies with FGR, the placenta should be sent to pathology for examination.

## 2025-04-25 ENCOUNTER — HOSPITAL ENCOUNTER (OUTPATIENT)
Dept: PERINATAL CARE | Facility: OTHER | Age: 35
Discharge: HOME | End: 2025-04-25
Attending: OBSTETRICS & GYNECOLOGY
Payer: COMMERCIAL

## 2025-04-25 DIAGNOSIS — O36.5931 POOR FETAL GROWTH AFFECTING MANAGEMENT OF MOTHER IN THIRD TRIMESTER, FETUS 1 OF MULTIPLE GESTATION: ICD-10-CM

## 2025-04-25 PROCEDURE — 59025 FETAL NON-STRESS TEST: CPT

## 2025-04-25 PROCEDURE — 59025 FETAL NON-STRESS TEST: CPT | Mod: 26,,, | Performed by: STUDENT IN AN ORGANIZED HEALTH CARE EDUCATION/TRAINING PROGRAM

## 2025-04-29 ENCOUNTER — HOSPITAL ENCOUNTER (OUTPATIENT)
Dept: PERINATAL CARE | Facility: OTHER | Age: 35
Discharge: HOME | End: 2025-04-29
Attending: OBSTETRICS & GYNECOLOGY
Payer: COMMERCIAL

## 2025-04-29 ENCOUNTER — PATIENT MESSAGE (OUTPATIENT)
Dept: OBSTETRICS AND GYNECOLOGY | Facility: CLINIC | Age: 35
End: 2025-04-29
Payer: COMMERCIAL

## 2025-04-29 DIAGNOSIS — O36.5931 POOR FETAL GROWTH AFFECTING MANAGEMENT OF MOTHER IN THIRD TRIMESTER, FETUS 1 OF MULTIPLE GESTATION: ICD-10-CM

## 2025-04-29 PROCEDURE — 76820 UMBILICAL ARTERY ECHO: CPT | Mod: 26,,, | Performed by: OBSTETRICS & GYNECOLOGY

## 2025-04-29 PROCEDURE — 76820 UMBILICAL ARTERY ECHO: CPT

## 2025-04-29 PROCEDURE — 76819 FETAL BIOPHYS PROFIL W/O NST: CPT | Mod: 26,,, | Performed by: OBSTETRICS & GYNECOLOGY

## 2025-04-29 PROCEDURE — 76819 FETAL BIOPHYS PROFIL W/O NST: CPT

## 2025-05-02 ENCOUNTER — HOSPITAL ENCOUNTER (OUTPATIENT)
Dept: PERINATAL CARE | Facility: OTHER | Age: 35
Discharge: HOME | End: 2025-05-02
Attending: OBSTETRICS & GYNECOLOGY
Payer: COMMERCIAL

## 2025-05-02 DIAGNOSIS — O36.5931 POOR FETAL GROWTH AFFECTING MANAGEMENT OF MOTHER IN THIRD TRIMESTER, FETUS 1 OF MULTIPLE GESTATION: ICD-10-CM

## 2025-05-02 PROCEDURE — 59025 FETAL NON-STRESS TEST: CPT

## 2025-05-02 PROCEDURE — 59025 FETAL NON-STRESS TEST: CPT | Mod: 26,,, | Performed by: OBSTETRICS & GYNECOLOGY

## 2025-05-06 ENCOUNTER — RESULTS FOLLOW-UP (OUTPATIENT)
Dept: OBSTETRICS AND GYNECOLOGY | Facility: CLINIC | Age: 35
End: 2025-05-06

## 2025-05-06 ENCOUNTER — ROUTINE PRENATAL (OUTPATIENT)
Dept: OBSTETRICS AND GYNECOLOGY | Facility: CLINIC | Age: 35
End: 2025-05-06
Payer: COMMERCIAL

## 2025-05-06 ENCOUNTER — HOSPITAL ENCOUNTER (OUTPATIENT)
Dept: PERINATAL CARE | Facility: OTHER | Age: 35
Discharge: HOME | End: 2025-05-06
Attending: OBSTETRICS & GYNECOLOGY
Payer: COMMERCIAL

## 2025-05-06 VITALS
WEIGHT: 162.94 LBS | DIASTOLIC BLOOD PRESSURE: 70 MMHG | SYSTOLIC BLOOD PRESSURE: 118 MMHG | BODY MASS INDEX: 27.97 KG/M2

## 2025-05-06 DIAGNOSIS — Z34.83 ENCOUNTER FOR SUPERVISION OF OTHER NORMAL PREGNANCY IN THIRD TRIMESTER: Primary | ICD-10-CM

## 2025-05-06 DIAGNOSIS — O36.5990 FETAL GROWTH RESTRICTION ANTEPARTUM: ICD-10-CM

## 2025-05-06 DIAGNOSIS — O36.5931 POOR FETAL GROWTH AFFECTING MANAGEMENT OF MOTHER IN THIRD TRIMESTER, FETUS 1 OF MULTIPLE GESTATION: ICD-10-CM

## 2025-05-06 PROCEDURE — 76819 FETAL BIOPHYS PROFIL W/O NST: CPT

## 2025-05-06 PROCEDURE — 99999 PR PBB SHADOW E&M-EST. PATIENT-LVL III: CPT | Mod: PBBFAC,,, | Performed by: OBSTETRICS & GYNECOLOGY

## 2025-05-06 PROCEDURE — 76820 UMBILICAL ARTERY ECHO: CPT

## 2025-05-06 PROCEDURE — 76819 FETAL BIOPHYS PROFIL W/O NST: CPT | Mod: 26,,, | Performed by: OBSTETRICS & GYNECOLOGY

## 2025-05-06 PROCEDURE — 76820 UMBILICAL ARTERY ECHO: CPT | Mod: 26,,, | Performed by: OBSTETRICS & GYNECOLOGY

## 2025-05-06 NOTE — PROGRESS NOTES
Good fetal movement.  No contractions, no vaginal bleeding, and no loss of fluid.    Counseled about FGR.  Induction scheduled for 38 weeks.  Reviewed S/D ratio and monitoring that may change induction plans.  Patient considering epidural now.   T3 labs today.

## 2025-05-08 NOTE — TELEPHONE ENCOUNTER
Spoke to patient via phone. Pt stated she will go to husbands place of work to get more information on their paternity leave process. She will call our office back once information is received.

## 2025-05-09 ENCOUNTER — HOSPITAL ENCOUNTER (OUTPATIENT)
Dept: PERINATAL CARE | Facility: OTHER | Age: 35
Discharge: HOME | End: 2025-05-09
Attending: OBSTETRICS & GYNECOLOGY
Payer: COMMERCIAL

## 2025-05-09 DIAGNOSIS — O36.5931 POOR FETAL GROWTH AFFECTING MANAGEMENT OF MOTHER IN THIRD TRIMESTER, FETUS 1 OF MULTIPLE GESTATION: ICD-10-CM

## 2025-05-09 PROCEDURE — 59025 FETAL NON-STRESS TEST: CPT | Mod: 26,,, | Performed by: STUDENT IN AN ORGANIZED HEALTH CARE EDUCATION/TRAINING PROGRAM

## 2025-05-09 PROCEDURE — 59025 FETAL NON-STRESS TEST: CPT

## 2025-05-10 ENCOUNTER — PATIENT MESSAGE (OUTPATIENT)
Dept: OTHER | Facility: OTHER | Age: 35
End: 2025-05-10
Payer: COMMERCIAL

## 2025-05-12 NOTE — ASSESSMENT & PLAN NOTE
Previously diagnosed with FGR.  Today US once again demonstrates FGR with overall EFW at the 4% with AC 3%. UA dopplers are wnl with BPP .   Some cardiac imaging today is concerning for possible post valvular dilation of the pulmonary artery of unknown etiology. See header.     Recommendations (Please refer to [facility] FGR guidelines):  Continue twice weekly  fetal surveillance with BPP or NST/MVP.   Weekly UA dopplers.   No further growth US scheduled  The patient is to perform fetal kick counts.   Monitor closely for any signs of evolving preeclampsia.  With respect to delivery planning, we recommend the followin 0/7 to 39 0/7 weeks - if EFW 3rd - <10th percentile, no co-morbidities exist and  surveillance is reassuring   37 0/7 weeks if any of the following:   EFW <3rd percentile   Umbilical artery S/D >95th percentile  Maternal co-morbidity (e.g. CHTN, DM, renal disease, autoimmune disease, AMA >40)   testing is non-reassuring   At 34 weeks  Oligohydramnios is diagnosed (unless GA less than 34; if less than 34 weeks, management needs to be individualized based on other testing and entire clinical scenario)  At 33 - 34 6/7 weeks, if:  Absent diastolic flow in the umbilical artery and there has not been a previous indication for delivery  At 30 - 32 6/7 weeks, if:   Reversed diastolic flow in the umbilical artery and there has not been a previous indication for delivery   For all pregnancies with FGR, the placenta should be sent to pathology for examination.

## 2025-05-13 ENCOUNTER — PROCEDURE VISIT (OUTPATIENT)
Dept: MATERNAL FETAL MEDICINE | Facility: CLINIC | Age: 35
End: 2025-05-13
Payer: COMMERCIAL

## 2025-05-13 ENCOUNTER — OFFICE VISIT (OUTPATIENT)
Dept: MATERNAL FETAL MEDICINE | Facility: CLINIC | Age: 35
End: 2025-05-13
Payer: COMMERCIAL

## 2025-05-13 VITALS
BODY MASS INDEX: 28.25 KG/M2 | SYSTOLIC BLOOD PRESSURE: 130 MMHG | DIASTOLIC BLOOD PRESSURE: 72 MMHG | WEIGHT: 164.56 LBS

## 2025-05-13 DIAGNOSIS — O35.BXX0 MATERNAL CARE FOR OTHER (SUSPECTED) FETAL ABNORMALITY AND DAMAGE, FETAL CARDIAC ANOMALIES, NOT APPLICABLE OR UNSPECIFIED: ICD-10-CM

## 2025-05-13 DIAGNOSIS — Z36.89 ENCOUNTER FOR ULTRASOUND TO CHECK FETAL GROWTH: ICD-10-CM

## 2025-05-13 DIAGNOSIS — O36.5931 POOR FETAL GROWTH AFFECTING MANAGEMENT OF MOTHER IN THIRD TRIMESTER, FETUS 1 OF MULTIPLE GESTATION: ICD-10-CM

## 2025-05-13 DIAGNOSIS — O36.5990 FETAL GROWTH RESTRICTION ANTEPARTUM: Primary | ICD-10-CM

## 2025-05-13 PROBLEM — O44.03 PLACENTA PREVIA IN THIRD TRIMESTER: Status: RESOLVED | Noted: 2025-04-22 | Resolved: 2025-05-13

## 2025-05-13 PROCEDURE — 99999 PR PBB SHADOW E&M-EST. PATIENT-LVL II: CPT | Mod: PBBFAC,,, | Performed by: OBSTETRICS & GYNECOLOGY

## 2025-05-13 NOTE — PROGRESS NOTES
Maternal Fetal Medicine follow up consult    SUBJECTIVE:     Shira Ashton is a 34 y.o.  female with IUP at 35w3d who is seen in follow up consultation by MFM.  Pregnancy complications include:   Problem   Maternal Care for Other (Suspected) Fetal Abnormality and Damage, Fetal Cardiac Anomalies, Not Applicable Or Unspecified   Fetal Growth Restriction Antepartum       Previous notes reviewed.   No changes to medical, surgical, family, social, or obstetric history.    Interval history since last MFM visit:   Doing well overall. No complaints.     Medications reviewed.    Care team members:  Dr. Fong - Primary OB     OBJECTIVE:   /72 (BP Location: Left arm, Patient Position: Sitting)   Wt 74.6 kg (164 lb 9.2 oz)   LMP 2024   BMI 28.25 kg/m²     Ultrasound performed. See viewpoint for full ultrasound report.  Gallardo live IUP  Fetal size is consistent with fetal growth restriction, with the EFW plotting at the 4% (1982 g) and the AC plotting at the 3%.   Some cardiac views demonstrate possible post valvular dilation of the pulmonary artery of unknown etiology. There is no evidence of regurgitation through the valve, aliasing cannot be ruled out.  Remaining limited repeat fetal anatomic survey appears normal.   The BPP score is reassuring at 8/8.  The MVP is normal.  Umbilical artery dopplers are normal with persistent forward flow, S/D ratio 68%  cephalic presentation      Significant labs/imaging:  None    ASSESSMENT/PLAN:     34 y.o.  female with IUP at 35w3d    Fetal growth restriction antepartum  Previously diagnosed with FGR.  Today US once again demonstrates FGR with overall EFW at the 4% with AC 3%. UA dopplers are wnl with BPP 8/8.   Some cardiac imaging today is concerning for possible post valvular dilation of the pulmonary artery of unknown etiology. See header.     Recommendations (Please refer to [facility] FGR guidelines):  Continue twice weekly  fetal surveillance  with BPP or NST/MVP.   Weekly UA dopplers.   No further growth US scheduled  The patient is to perform fetal kick counts.   Monitor closely for any signs of evolving preeclampsia.  With respect to delivery planning, we recommend the followin 0/7 to 39 0/7 weeks - if EFW 3rd - <10th percentile, no co-morbidities exist and  surveillance is reassuring   37 0/7 weeks if any of the following:   EFW <3rd percentile   Umbilical artery S/D >95th percentile  Maternal co-morbidity (e.g. CHTN, DM, renal disease, autoimmune disease, AMA >40)   testing is non-reassuring   At 34 weeks  Oligohydramnios is diagnosed (unless GA less than 34; if less than 34 weeks, management needs to be individualized based on other testing and entire clinical scenario)  At 33 - 34 6/7 weeks, if:  Absent diastolic flow in the umbilical artery and there has not been a previous indication for delivery  At 30 - 32 6/7 weeks, if:   Reversed diastolic flow in the umbilical artery and there has not been a previous indication for delivery   For all pregnancies with FGR, the placenta should be sent to pathology for examination.        Maternal care for other (suspected) fetal abnormality and damage, fetal cardiac anomalies, not applicable or unspecified  3VT and RVOT views suspicious for possible post valvular dilation of the pulmonary artery. This is of unknown etiology. There is no evidence of regurgitation through the valve. We reviewed that remaining cardiac imaging is reassuring, and that differential certainly includes a variant of normal.     Recommend post vince echocardiogram after birth and notification of prenatal findings to Pediatrics.   This finding does not alter delivery location, timing, or route.     No further MFM visits scheduled. Continue prenatal testing. IOL scheduled .     Mel Carpenter MD  PGY 7  Maternal Fetal Medicine  [facility]

## 2025-05-13 NOTE — Clinical Note
Here is the note from our most recent visit. Dr. Carpenter messaged you about possible cardiac finding and  eval recommendations. Please let me know if you have any questions. Danni

## 2025-05-16 ENCOUNTER — HOSPITAL ENCOUNTER (OUTPATIENT)
Dept: PERINATAL CARE | Facility: OTHER | Age: 35
Discharge: HOME | End: 2025-05-16
Attending: OBSTETRICS & GYNECOLOGY
Payer: COMMERCIAL

## 2025-05-16 DIAGNOSIS — O36.5931 POOR FETAL GROWTH AFFECTING MANAGEMENT OF MOTHER IN THIRD TRIMESTER, FETUS 1 OF MULTIPLE GESTATION: ICD-10-CM

## 2025-05-16 PROCEDURE — 59025 FETAL NON-STRESS TEST: CPT

## 2025-05-16 PROCEDURE — 59025 FETAL NON-STRESS TEST: CPT | Mod: 26,,, | Performed by: OBSTETRICS & GYNECOLOGY

## 2025-05-16 PROCEDURE — 76815 OB US LIMITED FETUS(S): CPT

## 2025-05-20 ENCOUNTER — HOSPITAL ENCOUNTER (OUTPATIENT)
Dept: PERINATAL CARE | Facility: OTHER | Age: 35
Discharge: HOME | End: 2025-05-20
Attending: OBSTETRICS & GYNECOLOGY
Payer: COMMERCIAL

## 2025-05-20 DIAGNOSIS — O36.5931 POOR FETAL GROWTH AFFECTING MANAGEMENT OF MOTHER IN THIRD TRIMESTER, FETUS 1 OF MULTIPLE GESTATION: ICD-10-CM

## 2025-05-20 PROCEDURE — 76820 UMBILICAL ARTERY ECHO: CPT

## 2025-05-20 PROCEDURE — 76820 UMBILICAL ARTERY ECHO: CPT | Mod: 26,,, | Performed by: OBSTETRICS & GYNECOLOGY

## 2025-05-20 PROCEDURE — 76819 FETAL BIOPHYS PROFIL W/O NST: CPT

## 2025-05-20 PROCEDURE — 76819 FETAL BIOPHYS PROFIL W/O NST: CPT | Mod: 26,,, | Performed by: OBSTETRICS & GYNECOLOGY

## 2025-05-21 DIAGNOSIS — O36.5990 FETAL GROWTH RESTRICTION ANTEPARTUM: Primary | ICD-10-CM

## 2025-05-27 ENCOUNTER — HOSPITAL ENCOUNTER (OUTPATIENT)
Dept: PERINATAL CARE | Facility: OTHER | Age: 35
Discharge: HOME | End: 2025-05-27
Attending: OBSTETRICS & GYNECOLOGY
Payer: COMMERCIAL

## 2025-05-27 ENCOUNTER — ROUTINE PRENATAL (OUTPATIENT)
Dept: OBSTETRICS AND GYNECOLOGY | Facility: CLINIC | Age: 35
End: 2025-05-27
Payer: COMMERCIAL

## 2025-05-27 VITALS
SYSTOLIC BLOOD PRESSURE: 112 MMHG | WEIGHT: 165.38 LBS | DIASTOLIC BLOOD PRESSURE: 74 MMHG | BODY MASS INDEX: 28.38 KG/M2

## 2025-05-27 DIAGNOSIS — Z34.83 ENCOUNTER FOR SUPERVISION OF OTHER NORMAL PREGNANCY IN THIRD TRIMESTER: ICD-10-CM

## 2025-05-27 DIAGNOSIS — O36.5931 POOR FETAL GROWTH AFFECTING MANAGEMENT OF MOTHER IN THIRD TRIMESTER, FETUS 1 OF MULTIPLE GESTATION: ICD-10-CM

## 2025-05-27 DIAGNOSIS — O36.5990 FETAL GROWTH RESTRICTION ANTEPARTUM: Primary | ICD-10-CM

## 2025-05-27 PROCEDURE — 76819 FETAL BIOPHYS PROFIL W/O NST: CPT

## 2025-05-27 PROCEDURE — 76819 FETAL BIOPHYS PROFIL W/O NST: CPT | Mod: 26,,, | Performed by: OBSTETRICS & GYNECOLOGY

## 2025-05-27 PROCEDURE — 87653 STREP B DNA AMP PROBE: CPT | Performed by: OBSTETRICS & GYNECOLOGY

## 2025-05-27 PROCEDURE — 76820 UMBILICAL ARTERY ECHO: CPT | Mod: 26,,, | Performed by: OBSTETRICS & GYNECOLOGY

## 2025-05-27 PROCEDURE — 76820 UMBILICAL ARTERY ECHO: CPT

## 2025-05-27 PROCEDURE — 99999 PR PBB SHADOW E&M-EST. PATIENT-LVL II: CPT | Mod: PBBFAC,,, | Performed by: OBSTETRICS & GYNECOLOGY

## 2025-05-27 NOTE — PROGRESS NOTES
Good fetal movement.  No contractions, no vaginal bleeding, and no loss of fluid.    FGR- plan for induction next week; will start with burnett balloon and pitocin  GBS today.

## 2025-05-29 ENCOUNTER — RESULTS FOLLOW-UP (OUTPATIENT)
Dept: OBSTETRICS AND GYNECOLOGY | Facility: CLINIC | Age: 35
End: 2025-05-29

## 2025-05-29 LAB — GROUP B STREPTOCOCCUS, PCR (OHS): NEGATIVE

## 2025-05-30 ENCOUNTER — HOSPITAL ENCOUNTER (OUTPATIENT)
Dept: PERINATAL CARE | Facility: OTHER | Age: 35
Discharge: HOME | End: 2025-05-30
Payer: COMMERCIAL

## 2025-05-30 DIAGNOSIS — O36.5990 FETAL GROWTH RESTRICTION ANTEPARTUM: ICD-10-CM

## 2025-05-30 PROCEDURE — 59025 FETAL NON-STRESS TEST: CPT

## 2025-05-30 PROCEDURE — 76815 OB US LIMITED FETUS(S): CPT

## 2025-05-30 PROCEDURE — 59025 FETAL NON-STRESS TEST: CPT | Mod: 26,,, | Performed by: OBSTETRICS & GYNECOLOGY

## 2025-06-02 ENCOUNTER — PATIENT MESSAGE (OUTPATIENT)
Dept: OBSTETRICS AND GYNECOLOGY | Facility: OTHER | Age: 35
End: 2025-06-02
Payer: COMMERCIAL

## 2025-06-02 ENCOUNTER — HOSPITAL ENCOUNTER (INPATIENT)
Facility: OTHER | Age: 35
LOS: 2 days | Discharge: HOME | End: 2025-06-04
Attending: OBSTETRICS & GYNECOLOGY | Admitting: OBSTETRICS & GYNECOLOGY
Payer: COMMERCIAL

## 2025-06-02 ENCOUNTER — ANESTHESIA (OUTPATIENT)
Dept: OBSTETRICS AND GYNECOLOGY | Facility: OTHER | Age: 35
End: 2025-06-02
Payer: COMMERCIAL

## 2025-06-02 ENCOUNTER — ANESTHESIA EVENT (OUTPATIENT)
Dept: OBSTETRICS AND GYNECOLOGY | Facility: OTHER | Age: 35
End: 2025-06-02
Payer: COMMERCIAL

## 2025-06-02 DIAGNOSIS — Z34.90 ENCOUNTER FOR INDUCTION OF LABOR: ICD-10-CM

## 2025-06-02 DIAGNOSIS — O36.5990 FETAL GROWTH RESTRICTION ANTEPARTUM: ICD-10-CM

## 2025-06-02 LAB
ABO + RH BLD: NORMAL
ABSOLUTE EOSINOPHIL (OHS): 0.16 K/UL
ABSOLUTE MONOCYTE (OHS): 0.64 K/UL (ref 0.3–1)
ABSOLUTE NEUTROPHIL COUNT (OHS): 5.51 K/UL (ref 1.8–7.7)
ALBUMIN SERPL BCP-MCNC: 2.9 G/DL (ref 3.5–5.2)
ALP SERPL-CCNC: 108 UNIT/L (ref 40–150)
ALT SERPL W/O P-5'-P-CCNC: 14 UNIT/L (ref 10–44)
ANION GAP (OHS): 11 MMOL/L (ref 8–16)
AST SERPL-CCNC: 14 UNIT/L (ref 11–45)
BASOPHILS # BLD AUTO: 0.03 K/UL
BASOPHILS NFR BLD AUTO: 0.4 %
BILIRUB SERPL-MCNC: 0.4 MG/DL (ref 0.1–1)
BLD GP AB SCN CELLS X3 SERPL QL: NORMAL
BUN SERPL-MCNC: 6 MG/DL (ref 6–20)
CALCIUM SERPL-MCNC: 8.7 MG/DL (ref 8.7–10.5)
CHLORIDE SERPL-SCNC: 108 MMOL/L (ref 95–110)
CO2 SERPL-SCNC: 19 MMOL/L (ref 23–29)
CREAT SERPL-MCNC: 0.6 MG/DL (ref 0.5–1.4)
CREAT UR-MCNC: 28.7 MG/DL (ref 15–325)
ERYTHROCYTE [DISTWIDTH] IN BLOOD BY AUTOMATED COUNT: 11.6 % (ref 11.5–14.5)
GFR SERPLBLD CREATININE-BSD FMLA CKD-EPI: >60 ML/MIN/1.73/M2
GLUCOSE SERPL-MCNC: 92 MG/DL (ref 70–110)
HCT VFR BLD AUTO: 38.6 % (ref 37–48.5)
HGB BLD-MCNC: 13.5 GM/DL (ref 12–16)
IMM GRANULOCYTES # BLD AUTO: 0.08 K/UL (ref 0–0.04)
IMM GRANULOCYTES NFR BLD AUTO: 1 % (ref 0–0.5)
LYMPHOCYTES # BLD AUTO: 1.95 K/UL (ref 1–4.8)
MCH RBC QN AUTO: 34.4 PG (ref 27–31)
MCHC RBC AUTO-ENTMCNC: 35 G/DL (ref 32–36)
MCV RBC AUTO: 99 FL (ref 82–98)
NUCLEATED RBC (/100WBC) (OHS): 0 /100 WBC
PLATELET # BLD AUTO: 234 K/UL (ref 150–450)
PMV BLD AUTO: 10.5 FL (ref 9.2–12.9)
POTASSIUM SERPL-SCNC: 3.9 MMOL/L (ref 3.5–5.1)
PROT SERPL-MCNC: 6.2 GM/DL (ref 6–8.4)
PROT UR-MCNC: <7 MG/DL
PROT/CREAT UR: NORMAL MG/G{CREAT}
RBC # BLD AUTO: 3.92 M/UL (ref 4–5.4)
RELATIVE EOSINOPHIL (OHS): 1.9 %
RELATIVE LYMPHOCYTE (OHS): 23.3 % (ref 18–48)
RELATIVE MONOCYTE (OHS): 7.6 % (ref 4–15)
RELATIVE NEUTROPHIL (OHS): 65.8 % (ref 38–73)
SODIUM SERPL-SCNC: 138 MMOL/L (ref 136–145)
SPECIMEN OUTDATE: NORMAL
T PALLIDUM IGG+IGM SER QL: NEGATIVE
WBC # BLD AUTO: 8.37 K/UL (ref 3.9–12.7)

## 2025-06-02 PROCEDURE — 82040 ASSAY OF SERUM ALBUMIN: CPT

## 2025-06-02 PROCEDURE — 86901 BLOOD TYPING SEROLOGIC RH(D): CPT

## 2025-06-02 PROCEDURE — 59200 INSERT CERVICAL DILATOR: CPT

## 2025-06-02 PROCEDURE — 82570 ASSAY OF URINE CREATININE: CPT

## 2025-06-02 PROCEDURE — 4A0HXCZ MEASUREMENT OF PRODUCTS OF CONCEPTION, CARDIAC RATE, EXTERNAL APPROACH: ICD-10-PCS | Performed by: OBSTETRICS & GYNECOLOGY

## 2025-06-02 PROCEDURE — 85025 COMPLETE CBC W/AUTO DIFF WBC: CPT

## 2025-06-02 PROCEDURE — 86593 SYPHILIS TEST NON-TREP QUANT: CPT

## 2025-06-02 PROCEDURE — 11000001 HC ACUTE MED/SURG PRIVATE ROOM

## 2025-06-02 PROCEDURE — 0U7C7DJ DILATION OF CERVIX WITH INTRALUM DEV, TEMP, VIA OPENING: ICD-10-PCS | Performed by: OBSTETRICS & GYNECOLOGY

## 2025-06-02 PROCEDURE — 72100002 HC LABOR CARE, 1ST 8 HOURS

## 2025-06-02 PROCEDURE — 63600175 PHARM REV CODE 636 W HCPCS

## 2025-06-02 RX ORDER — SODIUM CHLORIDE, SODIUM LACTATE, POTASSIUM CHLORIDE, CALCIUM CHLORIDE 600; 310; 30; 20 MG/100ML; MG/100ML; MG/100ML; MG/100ML
INJECTION, SOLUTION INTRAVENOUS CONTINUOUS
Status: DISCONTINUED | OUTPATIENT
Start: 2025-06-02 | End: 2025-06-04

## 2025-06-02 RX ORDER — OXYTOCIN-SODIUM CHLORIDE 0.9% IV SOLN 30 UNIT/500ML 30-0.9/5 UT/ML-%
95 SOLUTION INTRAVENOUS ONCE AS NEEDED
Status: DISCONTINUED | OUTPATIENT
Start: 2025-06-02 | End: 2025-06-03

## 2025-06-02 RX ORDER — OXYTOCIN-SODIUM CHLORIDE 0.9% IV SOLN 30 UNIT/500ML 30-0.9/5 UT/ML-%
10 SOLUTION INTRAVENOUS ONCE AS NEEDED
Status: DISCONTINUED | OUTPATIENT
Start: 2025-06-02 | End: 2025-06-03

## 2025-06-02 RX ORDER — OXYTOCIN 10 [USP'U]/ML
10 INJECTION, SOLUTION INTRAMUSCULAR; INTRAVENOUS ONCE AS NEEDED
Status: DISCONTINUED | OUTPATIENT
Start: 2025-06-02 | End: 2025-06-03

## 2025-06-02 RX ORDER — MISOPROSTOL 200 UG/1
800 TABLET ORAL ONCE AS NEEDED
Status: COMPLETED | OUTPATIENT
Start: 2025-06-02 | End: 2025-06-03

## 2025-06-02 RX ORDER — CALCIUM CARBONATE 200(500)MG
500 TABLET,CHEWABLE ORAL 3 TIMES DAILY PRN
Status: DISCONTINUED | OUTPATIENT
Start: 2025-06-02 | End: 2025-06-04

## 2025-06-02 RX ORDER — OXYTOCIN-SODIUM CHLORIDE 0.9% IV SOLN 30 UNIT/500ML 30-0.9/5 UT/ML-%
0-32 SOLUTION INTRAVENOUS CONTINUOUS
Status: DISCONTINUED | OUTPATIENT
Start: 2025-06-02 | End: 2025-06-03

## 2025-06-02 RX ORDER — SIMETHICONE 80 MG
1 TABLET,CHEWABLE ORAL 4 TIMES DAILY PRN
Status: DISCONTINUED | OUTPATIENT
Start: 2025-06-02 | End: 2025-06-03

## 2025-06-02 RX ORDER — DIPHENOXYLATE HYDROCHLORIDE AND ATROPINE SULFATE 2.5; .025 MG/1; MG/1
2 TABLET ORAL EVERY 6 HOURS PRN
Status: DISCONTINUED | OUTPATIENT
Start: 2025-06-02 | End: 2025-06-03

## 2025-06-02 RX ORDER — MISOPROSTOL 200 UG/1
800 TABLET ORAL ONCE AS NEEDED
Status: DISCONTINUED | OUTPATIENT
Start: 2025-06-02 | End: 2025-06-03

## 2025-06-02 RX ORDER — CARBOPROST TROMETHAMINE 250 UG/ML
250 INJECTION, SOLUTION INTRAMUSCULAR
Status: DISCONTINUED | OUTPATIENT
Start: 2025-06-02 | End: 2025-06-03

## 2025-06-02 RX ORDER — CEFAZOLIN 2 G/1
2 INJECTION, POWDER, FOR SOLUTION INTRAMUSCULAR; INTRAVENOUS ONCE AS NEEDED
Status: DISCONTINUED | OUTPATIENT
Start: 2025-06-02 | End: 2025-06-04

## 2025-06-02 RX ORDER — OXYTOCIN-SODIUM CHLORIDE 0.9% IV SOLN 30 UNIT/500ML 30-0.9/5 UT/ML-%
95 SOLUTION INTRAVENOUS CONTINUOUS PRN
Status: DISCONTINUED | OUTPATIENT
Start: 2025-06-02 | End: 2025-06-04

## 2025-06-02 RX ORDER — SODIUM CHLORIDE 9 MG/ML
INJECTION, SOLUTION INTRAVENOUS
Status: DISCONTINUED | OUTPATIENT
Start: 2025-06-02 | End: 2025-06-04

## 2025-06-02 RX ORDER — LIDOCAINE HYDROCHLORIDE 10 MG/ML
10 INJECTION, SOLUTION INFILTRATION; PERINEURAL ONCE AS NEEDED
Status: DISCONTINUED | OUTPATIENT
Start: 2025-06-02 | End: 2025-06-04

## 2025-06-02 RX ORDER — METHYLERGONOVINE MALEATE 0.2 MG/ML
200 INJECTION INTRAVENOUS ONCE AS NEEDED
Status: DISCONTINUED | OUTPATIENT
Start: 2025-06-02 | End: 2025-06-04

## 2025-06-02 RX ORDER — ONDANSETRON 8 MG/1
8 TABLET, ORALLY DISINTEGRATING ORAL EVERY 8 HOURS PRN
Status: DISCONTINUED | OUTPATIENT
Start: 2025-06-02 | End: 2025-06-03

## 2025-06-02 RX ORDER — TRANEXAMIC ACID 10 MG/ML
1000 INJECTION, SOLUTION INTRAVENOUS EVERY 30 MIN PRN
Status: DISCONTINUED | OUTPATIENT
Start: 2025-06-02 | End: 2025-06-03 | Stop reason: SDUPTHER

## 2025-06-02 RX ADMIN — OXYTOCIN-SODIUM CHLORIDE 0.9% IV SOLN 30 UNIT/500ML 2 MILLI-UNITS/MIN: 30-0.9/5 SOLUTION at 06:06

## 2025-06-02 RX ADMIN — SODIUM CHLORIDE, POTASSIUM CHLORIDE, SODIUM LACTATE AND CALCIUM CHLORIDE: 600; 310; 30; 20 INJECTION, SOLUTION INTRAVENOUS at 06:06

## 2025-06-02 NOTE — PLAN OF CARE
Problem: Adult Inpatient Plan of Care  Goal: Plan of Care Review  Outcome: Progressing  Goal: Patient-Specific Goal (Individualized)  Outcome: Progressing  Goal: Absence of Hospital-Acquired Illness or Injury  Outcome: Progressing  Goal: Optimal Comfort and Wellbeing  Outcome: Progressing  Goal: Readiness for Transition of Care  Outcome: Progressing     Problem:  Fall Injury Risk  Goal: Absence of Fall, Infant Drop and Related Injury  Outcome: Progressing     Problem: Infection  Goal: Absence of Infection Signs and Symptoms  Outcome: Progressing     Problem: Labor  Goal: Hemostasis  Outcome: Progressing  Goal: Stable Fetal Wellbeing  Outcome: Progressing  Goal: Effective Progression to Delivery  Outcome: Progressing  Goal: Absence of Infection Signs and Symptoms  Outcome: Progressing  Goal: Acceptable Pain Control  Outcome: Progressing  Goal: Normal Uterine Contraction Pattern  Outcome: Progressing     VSS. No pain noted at this time. Plan of care reviewed with patient and family.

## 2025-06-02 NOTE — H&P
HISTORY AND PHYSICAL                                                OBSTETRICS          Subjective:       Shira Ashton is a 34 y.o.  female with IUP at 38w2d weeks gestation who presents for IOL 2/2 fetal growth restriction.    Patient denies contractions, denies vaginal bleeding, denies LOF.   Fetal Movement: normal.    This IUP is complicated by FGR, Fetal Valvular Dilation of Pulmonary Artery.    Review of Systems   Constitutional:  Negative for chills and fever.   Respiratory:  Negative for shortness of breath.    Cardiovascular:  Negative for chest pain.   Gastrointestinal:  Negative for nausea and vomiting.   Genitourinary:  Negative for dysuria, hematuria, pelvic pain and vaginal bleeding.   Musculoskeletal:  Negative for back pain.     PMHx:   Past Medical History:   Diagnosis Date    Abnormal Pap smear of cervix     GERD (gastroesophageal reflux disease)     Vocal nodules in adults        PSHx:   Past Surgical History:   Procedure Laterality Date    CERVICAL BIOPSY  W/ LOOP ELECTRODE EXCISION          COLONOSCOPY N/A 2023    Procedure: COLONOSCOPY;  Surgeon: Melissa Cabrera MD;  Location: [facility];  Service: Endoscopy;  Laterality: N/A;  needs office/ppm    WISDOM TOOTH EXTRACTION         All: Review of patient's allergies indicates:  No Known Allergies    Meds: [Prescriptions Prior to Admission]    [Prescriptions Prior to Admission]  Medications Prior to Admission   Medication Sig Dispense Refill Last Dose/Taking    PNV no.95/ferrous fum/folic ac (PRENATAL ORAL) Take by mouth.          SH: [Social History]    [Social History]  Socioeconomic History    Marital status:    Occupational History    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    Tobacco Use    Smoking status: Never     Passive exposure: Never    Smokeless tobacco: Never   Substance and Sexual Activity    Alcohol use: Not Currently     Comment: socially    Drug use: No    Sexual activity: Yes     Partners: Male      Birth control/protection: None     Social Drivers of Health     Stress: No Stress Concern Present (2020)    [employer] [place] [employer] - Occupational Stress Questionnaire     Feeling of Stress : Only a little       FH:   Family History   Problem Relation Name Age of Onset    Breast cancer Maternal Grandmother      Colon cancer Father  41    Diabetes Father      Hypertension Father      Coronary artery disease Father          bypass    Psoriasis Father          psoriatic arthritis     Cancer Father      Breast cancer Maternal Aunt      Diabetes Paternal Aunt      Coronary artery disease Paternal Aunt      Heart attacks under age 50 Paternal Aunt      Diverticulitis Mother      Eclampsia Neg Hx      Stroke Neg Hx      Miscarriages / Stillbirths Neg Hx      Ovarian cancer Neg Hx       labor Neg Hx         OBHx:   OB History    Para Term  AB Living   3 0 0 0 2 0   SAB IAB Ectopic Multiple Live Births   2 0 0 0 0      # Outcome Date GA Lbr Madhu/2nd Weight Sex Type Anes PTL Lv   3 Current            2 SAB 2024 6w0d    SAB   FD   1 SAB 24               Objective:       BP (!) 142/88 Comment: Md notified.  Pulse 76   Temp 98.2 °F (36.8 °C) (Oral)   Resp 17   LMP 2024   SpO2 97%     Vitals:    25 1640   BP: (!) 142/88   Pulse: 76   Resp: 17   Temp: 98.2 °F (36.8 °C)   TempSrc: Oral   SpO2: 97%       General:   alert, appears stated age and cooperative, no apparent distress   HENT:  normocephalic, atraumatic   Eyes:  extraocular movements and conjunctivae normal   Neck:  supple, range of motion normal, no thyromegaly   Lungs:   no respiratory distress   Heart:   regular rate   Abdomen:  soft, non-tender, non-distended but gravid, no rebound or guarding    Extremities Trace pedal edema, negative erythema   FHT: 130 bpm, moderate BTBV, +accels, -decels;  Cat 1 (reassuring)                 TOCO: Irregular   Presentations: cephalic by ultrasound   Cervix:      Dilation: 1cm    Effacement: 70    Station:  -3    Consistency: soft    Position: middle   Sterile Speculum Exam: Not indicated    Recent Growth Scan: EFW 4%&AC 3% @ 31w6d (25)    Lab Review  Blood Type O POS  GBBS: negative  Rubella: Immune  Trep: previously non-reactive, collected on admit   HIV: negative  HepB: negative       Assessment:       38w2d weeks gestation with:    Active Hospital Problems    Diagnosis  POA    *Encounter for induction of labor [Z34.90]  Not Applicable    Maternal care for other (suspected) fetal abnormality and damage, fetal cardiac anomalies, not applicable or unspecified [O35.BXX0]  Yes    Fetal growth restriction antepartum [O36.5990]  Yes    GERD (gastroesophageal reflux disease) [K21.9]  Yes      Resolved Hospital Problems   No resolved problems to display.     Plan:      Risks, benefits, alternatives and possible complications have been discussed in detail with the patient.   - Consents signed and to chart  - Admit to Labor and Delivery unit  - IOL w/ cervical burnett and pitocin    - Epidural per Anesthesia  - Draw CBC, T&S, Trep  - Notify Staff  - Recheck in 4 hrs or PRN  - EFW 4%&AC 3% @ 31w6d (25)  - Maternal pelvis unproven     FGR  - EFW 4%&AC 3% @ 31w6d (25)  - LD pit for induction     Fetal Valvular Dilation of Pulmonary Artery  - Mat 21 negative   - Some cardiac views demonstrate possible post valvular dilation of the pulmonary artery of unknown etiology   - NICU present at time of delivery   - will need post-vince ECHO     Elevated BP x1  - asymptomatic, denies symptoms of Pre-E at this time   - BP as seen above  - Pre-E labs ordered, awaiting results     Contraception: per primary OBGYN    Post-Partum Hemorrhage risk - low    Alba Church  [facility]   OBGYN PGY1

## 2025-06-03 PROBLEM — Z87.59 HISTORY OF THIRD DEGREE PERINEAL LACERATION: Status: ACTIVE | Noted: 2025-06-03

## 2025-06-03 PROBLEM — O13.9 GESTATIONAL HYPERTENSION: Status: ACTIVE | Noted: 2025-06-03

## 2025-06-03 PROBLEM — O35.BXX0 MATERNAL CARE FOR OTHER (SUSPECTED) FETAL ABNORMALITY AND DAMAGE, FETAL CARDIAC ANOMALIES, NOT APPLICABLE OR UNSPECIFIED: Status: RESOLVED | Noted: 2025-05-13 | Resolved: 2025-06-03

## 2025-06-03 PROBLEM — Z34.90 ENCOUNTER FOR INDUCTION OF LABOR: Status: RESOLVED | Noted: 2025-06-02 | Resolved: 2025-06-03

## 2025-06-03 PROBLEM — O36.5990 FETAL GROWTH RESTRICTION ANTEPARTUM: Status: RESOLVED | Noted: 2025-04-22 | Resolved: 2025-06-03

## 2025-06-03 PROCEDURE — 88307 TISSUE EXAM BY PATHOLOGIST: CPT | Mod: TC

## 2025-06-03 PROCEDURE — 88307 TISSUE EXAM BY PATHOLOGIST: CPT | Mod: 26,,, | Performed by: PATHOLOGY

## 2025-06-03 PROCEDURE — 63600175 PHARM REV CODE 636 W HCPCS

## 2025-06-03 PROCEDURE — 0DQR0ZZ REPAIR ANAL SPHINCTER, OPEN APPROACH: ICD-10-PCS | Performed by: OBSTETRICS & GYNECOLOGY

## 2025-06-03 PROCEDURE — 25000003 PHARM REV CODE 250

## 2025-06-03 PROCEDURE — 72100003 HC LABOR CARE, EA. ADDL. 8 HRS

## 2025-06-03 PROCEDURE — 11000001 HC ACUTE MED/SURG PRIVATE ROOM

## 2025-06-03 PROCEDURE — 72200005 HC VAGINAL DELIVERY LEVEL II

## 2025-06-03 PROCEDURE — 62326 NJX INTERLAMINAR LMBR/SAC: CPT

## 2025-06-03 PROCEDURE — 59400 OBSTETRICAL CARE: CPT | Mod: AT,,, | Performed by: OBSTETRICS & GYNECOLOGY

## 2025-06-03 PROCEDURE — 27200710 HC EPIDURAL INFUSION PUMP SET: Performed by: ANESTHESIOLOGY

## 2025-06-03 PROCEDURE — C1751 CATH, INF, PER/CENT/MIDLINE: HCPCS | Performed by: ANESTHESIOLOGY

## 2025-06-03 RX ORDER — HYDROCODONE BITARTRATE AND ACETAMINOPHEN 10; 325 MG/1; MG/1
1 TABLET ORAL EVERY 4 HOURS PRN
Status: DISCONTINUED | OUTPATIENT
Start: 2025-06-03 | End: 2025-06-03

## 2025-06-03 RX ORDER — PRENATAL WITH FERROUS FUM AND FOLIC ACID 3080; 920; 120; 400; 22; 1.84; 3; 20; 10; 1; 12; 200; 27; 25; 2 [IU]/1; [IU]/1; MG/1; [IU]/1; MG/1; MG/1; MG/1; MG/1; MG/1; MG/1; UG/1; MG/1; MG/1; MG/1; MG/1
1 TABLET ORAL DAILY
Status: DISCONTINUED | OUTPATIENT
Start: 2025-06-03 | End: 2025-06-04 | Stop reason: HOSPADM

## 2025-06-03 RX ORDER — TRANEXAMIC ACID 10 MG/ML
1000 INJECTION, SOLUTION INTRAVENOUS EVERY 30 MIN PRN
Status: DISCONTINUED | OUTPATIENT
Start: 2025-06-03 | End: 2025-06-04 | Stop reason: HOSPADM

## 2025-06-03 RX ORDER — SODIUM CHLORIDE 0.9 % (FLUSH) 0.9 %
10 SYRINGE (ML) INJECTION
Status: DISCONTINUED | OUTPATIENT
Start: 2025-06-03 | End: 2025-06-04 | Stop reason: HOSPADM

## 2025-06-03 RX ORDER — DIPHENHYDRAMINE HYDROCHLORIDE 50 MG/ML
25 INJECTION, SOLUTION INTRAMUSCULAR; INTRAVENOUS EVERY 4 HOURS PRN
Status: DISCONTINUED | OUTPATIENT
Start: 2025-06-03 | End: 2025-06-04 | Stop reason: HOSPADM

## 2025-06-03 RX ORDER — OXYTOCIN-SODIUM CHLORIDE 0.9% IV SOLN 30 UNIT/500ML 30-0.9/5 UT/ML-%
10 SOLUTION INTRAVENOUS ONCE AS NEEDED
Status: DISCONTINUED | OUTPATIENT
Start: 2025-06-03 | End: 2025-06-04

## 2025-06-03 RX ORDER — DIPHENOXYLATE HYDROCHLORIDE AND ATROPINE SULFATE 2.5; .025 MG/1; MG/1
2 TABLET ORAL EVERY 6 HOURS PRN
Status: DISCONTINUED | OUTPATIENT
Start: 2025-06-03 | End: 2025-06-04 | Stop reason: HOSPADM

## 2025-06-03 RX ORDER — ONDANSETRON 8 MG/1
8 TABLET, ORALLY DISINTEGRATING ORAL EVERY 8 HOURS PRN
Status: DISCONTINUED | OUTPATIENT
Start: 2025-06-03 | End: 2025-06-04 | Stop reason: HOSPADM

## 2025-06-03 RX ORDER — MISOPROSTOL 200 UG/1
800 TABLET ORAL ONCE AS NEEDED
Status: DISCONTINUED | OUTPATIENT
Start: 2025-06-03 | End: 2025-06-04 | Stop reason: HOSPADM

## 2025-06-03 RX ORDER — DOCUSATE SODIUM 100 MG/1
100 CAPSULE, LIQUID FILLED ORAL 2 TIMES DAILY
Status: DISCONTINUED | OUTPATIENT
Start: 2025-06-03 | End: 2025-06-03

## 2025-06-03 RX ORDER — IBUPROFEN 600 MG/1
600 TABLET, FILM COATED ORAL EVERY 6 HOURS
Status: DISCONTINUED | OUTPATIENT
Start: 2025-06-03 | End: 2025-06-04 | Stop reason: HOSPADM

## 2025-06-03 RX ORDER — FENTANYL/BUPIVACAINE/NS/PF 2MCG/ML-.1
PLASTIC BAG, INJECTION (ML) INJECTION CONTINUOUS PRN
Status: DISCONTINUED | OUTPATIENT
Start: 2025-06-03 | End: 2025-06-03

## 2025-06-03 RX ORDER — OXYTOCIN 10 [USP'U]/ML
10 INJECTION, SOLUTION INTRAMUSCULAR; INTRAVENOUS ONCE AS NEEDED
Status: DISCONTINUED | OUTPATIENT
Start: 2025-06-03 | End: 2025-06-04

## 2025-06-03 RX ORDER — HYDROCORTISONE 25 MG/G
CREAM TOPICAL 3 TIMES DAILY PRN
Status: DISCONTINUED | OUTPATIENT
Start: 2025-06-03 | End: 2025-06-04 | Stop reason: HOSPADM

## 2025-06-03 RX ORDER — DIPHENHYDRAMINE HCL 25 MG
25 CAPSULE ORAL EVERY 4 HOURS PRN
Status: DISCONTINUED | OUTPATIENT
Start: 2025-06-03 | End: 2025-06-04 | Stop reason: HOSPADM

## 2025-06-03 RX ORDER — CARBOPROST TROMETHAMINE 250 UG/ML
250 INJECTION, SOLUTION INTRAMUSCULAR
Status: DISCONTINUED | OUTPATIENT
Start: 2025-06-03 | End: 2025-06-04 | Stop reason: HOSPADM

## 2025-06-03 RX ORDER — DOCUSATE SODIUM 100 MG/1
200 CAPSULE, LIQUID FILLED ORAL 2 TIMES DAILY PRN
Status: DISCONTINUED | OUTPATIENT
Start: 2025-06-03 | End: 2025-06-04 | Stop reason: HOSPADM

## 2025-06-03 RX ORDER — LIDOCAINE HYDROCHLORIDE AND EPINEPHRINE 15; 5 MG/ML; UG/ML
INJECTION, SOLUTION EPIDURAL
Status: DISCONTINUED | OUTPATIENT
Start: 2025-06-03 | End: 2025-06-03

## 2025-06-03 RX ORDER — DOCUSATE SODIUM 100 MG/1
200 CAPSULE, LIQUID FILLED ORAL 2 TIMES DAILY
Status: DISCONTINUED | OUTPATIENT
Start: 2025-06-03 | End: 2025-06-04 | Stop reason: HOSPADM

## 2025-06-03 RX ORDER — OXYCODONE HYDROCHLORIDE 10 MG/1
10 TABLET ORAL EVERY 4 HOURS PRN
Refills: 0 | Status: DISCONTINUED | OUTPATIENT
Start: 2025-06-03 | End: 2025-06-04 | Stop reason: HOSPADM

## 2025-06-03 RX ORDER — ACETAMINOPHEN 325 MG/1
650 TABLET ORAL EVERY 6 HOURS PRN
Status: DISCONTINUED | OUTPATIENT
Start: 2025-06-03 | End: 2025-06-03

## 2025-06-03 RX ORDER — POLYETHYLENE GLYCOL 3350 17 G/17G
17 POWDER, FOR SOLUTION ORAL DAILY
Status: DISCONTINUED | OUTPATIENT
Start: 2025-06-03 | End: 2025-06-04 | Stop reason: HOSPADM

## 2025-06-03 RX ORDER — OXYTOCIN-SODIUM CHLORIDE 0.9% IV SOLN 30 UNIT/500ML 30-0.9/5 UT/ML-%
95 SOLUTION INTRAVENOUS ONCE AS NEEDED
Status: DISCONTINUED | OUTPATIENT
Start: 2025-06-03 | End: 2025-06-03

## 2025-06-03 RX ORDER — CEFOXITIN 2 G/1
2 INJECTION, POWDER, FOR SOLUTION INTRAVENOUS ONCE
Status: COMPLETED | OUTPATIENT
Start: 2025-06-03 | End: 2025-06-03

## 2025-06-03 RX ORDER — OXYCODONE HYDROCHLORIDE 5 MG/1
5 TABLET ORAL EVERY 4 HOURS PRN
Refills: 0 | Status: DISCONTINUED | OUTPATIENT
Start: 2025-06-03 | End: 2025-06-04 | Stop reason: HOSPADM

## 2025-06-03 RX ORDER — HYDROCODONE BITARTRATE AND ACETAMINOPHEN 5; 325 MG/1; MG/1
1 TABLET ORAL EVERY 4 HOURS PRN
Status: DISCONTINUED | OUTPATIENT
Start: 2025-06-03 | End: 2025-06-03

## 2025-06-03 RX ORDER — OXYTOCIN-SODIUM CHLORIDE 0.9% IV SOLN 30 UNIT/500ML 30-0.9/5 UT/ML-%
95 SOLUTION INTRAVENOUS CONTINUOUS PRN
Status: DISCONTINUED | OUTPATIENT
Start: 2025-06-03 | End: 2025-06-04

## 2025-06-03 RX ORDER — ACETAMINOPHEN 325 MG/1
650 TABLET ORAL EVERY 6 HOURS
Status: DISCONTINUED | OUTPATIENT
Start: 2025-06-03 | End: 2025-06-04 | Stop reason: HOSPADM

## 2025-06-03 RX ORDER — FENTANYL/BUPIVACAINE/NS/PF 2MCG/ML-.1
PLASTIC BAG, INJECTION (ML) INJECTION
Status: COMPLETED
Start: 2025-06-03 | End: 2025-06-03

## 2025-06-03 RX ORDER — METHYLERGONOVINE MALEATE 0.2 MG/ML
200 INJECTION INTRAVENOUS ONCE AS NEEDED
Status: DISCONTINUED | OUTPATIENT
Start: 2025-06-03 | End: 2025-06-03

## 2025-06-03 RX ORDER — SIMETHICONE 80 MG
1 TABLET,CHEWABLE ORAL EVERY 6 HOURS PRN
Status: DISCONTINUED | OUTPATIENT
Start: 2025-06-03 | End: 2025-06-04 | Stop reason: HOSPADM

## 2025-06-03 RX ADMIN — ACETAMINOPHEN 650 MG: 325 TABLET ORAL at 05:06

## 2025-06-03 RX ADMIN — FENTANYL CITRATE 10 ML/HR: 50 INJECTION INTRAMUSCULAR; INTRAVENOUS at 01:06

## 2025-06-03 RX ADMIN — LIDOCAINE HYDROCHLORIDE,EPINEPHRINE BITARTRATE 2 ML: 15; .005 INJECTION, SOLUTION EPIDURAL; INFILTRATION; INTRACAUDAL; PERINEURAL at 12:06

## 2025-06-03 RX ADMIN — LIDOCAINE HYDROCHLORIDE,EPINEPHRINE BITARTRATE 3 ML: 15; .005 INJECTION, SOLUTION EPIDURAL; INFILTRATION; INTRACAUDAL; PERINEURAL at 12:06

## 2025-06-03 RX ADMIN — PRENATAL VIT W/ FE FUMARATE-FA TAB 27-0.8 MG 1 TABLET: 27-0.8 TAB at 11:06

## 2025-06-03 RX ADMIN — IBUPROFEN 600 MG: 600 TABLET ORAL at 05:06

## 2025-06-03 RX ADMIN — CEFOXITIN 2 G: 2 INJECTION, POWDER, FOR SOLUTION INTRAVENOUS at 05:06

## 2025-06-03 RX ADMIN — MISOPROSTOL 800 MCG: 200 TABLET ORAL at 05:06

## 2025-06-03 RX ADMIN — IBUPROFEN 600 MG: 600 TABLET ORAL at 11:06

## 2025-06-03 RX ADMIN — POLYETHYLENE GLYCOL 3350 17 G: 17 POWDER, FOR SOLUTION ORAL at 11:06

## 2025-06-03 RX ADMIN — DOCUSATE SODIUM 200 MG: 100 CAPSULE, LIQUID FILLED ORAL at 11:06

## 2025-06-03 RX ADMIN — OXYTOCIN-SODIUM CHLORIDE 0.9% IV SOLN 30 UNIT/500ML 10 UNITS: 30-0.9/5 SOLUTION at 04:06

## 2025-06-03 RX ADMIN — ACETAMINOPHEN 650 MG: 325 TABLET ORAL at 11:06

## 2025-06-03 NOTE — PLAN OF CARE
Problem: Adult Inpatient Plan of Care  Goal: Plan of Care Review  6/3/2025 0738 by Beatris Pandey RN  Outcome: Progressing  6/3/2025 0738 by Beatris Pandey RN  Outcome: Progressing  Goal: Patient-Specific Goal (Individualized)  6/3/2025 0738 by Beatris Pandey RN  Outcome: Progressing  6/3/2025 0738 by Beatris Pandey RN  Outcome: Progressing  Goal: Absence of Hospital-Acquired Illness or Injury  6/3/2025 0738 by Beatris Pandey RN  Outcome: Progressing  6/3/2025 0738 by Beatris Pandey RN  Outcome: Progressing  Goal: Optimal Comfort and Wellbeing  6/3/2025 0738 by Beatris Pandey RN  Outcome: Progressing  6/3/2025 0738 by Beatris Pandey RN  Outcome: Progressing  Goal: Readiness for Transition of Care  6/3/2025 0738 by Beatris Pandey RN  Outcome: Progressing  6/3/2025 0738 by Beatris Pandey RN  Outcome: Progressing     Problem:  Fall Injury Risk  Goal: Absence of Fall, Infant Drop and Related Injury  6/3/2025 0738 by Beatris Pandey RN  Outcome: Progressing  6/3/2025 0738 by Beatris Pandey RN  Outcome: Progressing     Problem: Infection  Goal: Absence of Infection Signs and Symptoms  6/3/2025 0738 by Beatris Pandey RN  Outcome: Progressing  6/3/2025 0738 by Beatris Pandey RN  Outcome: Progressing     Problem: Breastfeeding  Goal: Effective Breastfeeding  6/3/2025 0738 by Beatris Pandey RN  Outcome: Progressing  6/3/2025 0738 by Beatris Pandey RN  Outcome: Progressing     Problem: Anesthesia/Analgesia, Neuraxial  Goal: Effective Urinary Elimination  Outcome: Progressing

## 2025-06-03 NOTE — PLAN OF CARE
Basic lactation education reviewed, Breastfeeding/postpartum guide at bedside.   Assisted with position and latch, baby nursing well with rhythmic sucking and swallows observed.    LC number on board to call for assistance. To feed on cue 8 or more times in 24hrs, monitor voids/stools, and weight loss. Hand express and offer EBM if sleepy.

## 2025-06-03 NOTE — PROGRESS NOTES
LATE LABOR PROGRESS NOTE    MD to bedside for cervical check. Complaints: None. Epidural: Not applicable    Temp:  [97.9 °F (36.6 °C)-98.2 °F (36.8 °C)] 97.9 °F (36.6 °C)  Pulse:  [58-90] 66  Resp:  [16-18] 18  SpO2:  [97 %-100 %] 99 %  BP: (111-142)/(64-88) 125/74    FHT: Category 1  , baseline 120, +accel, -decel, mod variability  CTX: irregular     ASSESSMENT   34 y.o.  at 38w3d, for IOL    TIMELINE  : /-3, pit @ 4    PLAN  1. Labor management  -Continue continuous maternal/fetal monitoring.   -Recheck 2-4 hours or PRN  -Pitocin per orders    Dustin Jose MD PGY-3  Obstetrics and Gynecology

## 2025-06-03 NOTE — PROGRESS NOTES
Late LABOR PROGRESS NOTE    S:  MD to bedside for routine SVE. Complaints: No.       O: Temp:  [97.9 °F (36.6 °C)-98.2 °F (36.8 °C)] 97.9 °F (36.6 °C)  Pulse:  [58-90] 66  Resp:  [16-18] 18  SpO2:  [97 %-100 %] 99 %  BP: (111-142)/(64-88) 125/74      FHT: Cat 1  SVE:     TIMELINE:   : 1/70/-3  1930: 2/70/-3   2130:     ASSESSMENT:   34 y.o.  at 38w3d, for IOL    FHT reassuring    Active Hospital Problems    Diagnosis  POA    *Encounter for induction of labor [Z34.90]  Not Applicable    Maternal care for other (suspected) fetal abnormality and damage, fetal cardiac anomalies, not applicable or unspecified [O35.BXX0]  Yes    Fetal growth restriction antepartum [O36.5990]  Yes    GERD (gastroesophageal reflux disease) [K21.9]  Yes      Resolved Hospital Problems   No resolved problems to display.         PLAN:    Labor management  Continue Close Maternal/Fetal Monitoring.   Pitocin Augmentation per protocol  Recheck 2-4 hours or PRN  Patient desires time to consider AROM without epidural vs. Epidural > AROM. Recommended AROM now or epidural prior to next check at 4h or sooner to continue labor augmentation    Rita Gil MD.

## 2025-06-03 NOTE — PROGRESS NOTES
06/03/25 0313   TeleStork Denise Note - Strip   Strip Reviewed by Denise Nurse? Yes   TeleStork Denise Note - Communication   Vidal Nurse Communicated with Bedside Nurse Regarding: Fetal Status   TeleStork Denise Note - Notification   Nurse Notified? Yes  (at the bs per staff at desk.)

## 2025-06-03 NOTE — L&D DELIVERY NOTE
"[facility] - Labor & Delivery  Vaginal Delivery   Operative Note    SUMMARY     Normal spontaneous vaginal delivery of live infant, was placed on mothers abdomen for skin to skin and bulb suctioning performed.  Infant delivered position TALHA over perineum.  Nuchal cord: Yes, cord reduced following delivery.    Spontaneous delivery of placenta and IV pitocin given noting lower uterine segment atony with bleeding. 800 mcg rectal Cytotec placed noting improvement in uterine tone and bleeding.   3b degree laceration noted and repaired with reinforcing interrupted suture of 0 vicryl. Additional interrupted deep sutures of 0 vicryl placed to reinforce bulbocavernosus. Remaining perineal laceration repaired in standard running fashion using 2-0 vicryl.    Patient tolerated delivery well. Sponge needle and lap counted correctly x2.    Indications:  (spontaneous vaginal delivery)  Pregnancy complicated by: Problem List[1]  Admitting GA: 38w3d    Delivery Information for Kylee Ashton    Birth information:  YOB: 2024   Time of birth: 4:49 AM   Sex: female   Head Delivery Date/Time: 6/3/2025  4:49 AM   Delivery type: Vaginal, Spontaneous   Gestational Age: 38w3d       Delivery Providers    Delivering clinician: Kayce Fong MD   Provider Role    Rita Gil MD Resident    Lonny, Yomaira, RN Registered Nurse    Jaimie Crowder RN Registered Nurse    Sherry Pinedo ST Scrub Person              Measurements    Weight: 2820 g  Weight (lbs): 6 lb 3.5 oz  Length: 48.3 cm  Length (in): 19"  Head circumference: 32 cm  Chest circumference: 30.5 cm         Apgars    Living status: Living  Apgar Component Scores:  1 min.:  5 min.:  10 min.:  15 min.:  20 min.:    Skin color:  0  1       Heart rate:  2  2       Reflex irritability:  2  2       Muscle tone:  2  2       Respiratory effort:  2  2       Total:  8  9       Apgars assigned by: NICU         Operative Delivery    Forceps attempted?: No  Vacuum " extractor attempted?: No         Shoulder Dystocia    Shoulder dystocia present?: No           Presentation    Presentation: Vertex  Position: Occiput Anterior           Interventions/Resuscitation    Method: Bulb Suctioning, Tactile Stimulation       Cord    Vessels: 3 vessels  Complications: Nuchal  Nuchal Intervention: reduced  Nuchal Cord Description: loose nuchal cord  Number of Loops: 1  Delayed Cord Clamping?: Yes  Cord Clamped Date/Time: 6/3/2025  4:50 AM  Cord Blood Disposition: Sent with Baby  Gases Sent?: No  Stem Cell Collection (by MD): No       Placenta    Placenta delivery date/time: 6/3/2025 04:53  Placenta removal: Manual removal  Placenta appearance: Intact  Placenta disposition: Pathology           Labor Events:       labor: No     Labor Onset Date/Time:         Dilation Complete Date/Time:         Start Pushing Date/Time:         Start Pushing Date/Time:       Rupture Date/Time: 25 0058        Rupture type: SRM (Spontaneous Rupture)        Fluid Amount:       Fluid Color: Clear              steroids: None     Antibiotics given for GBS: No     Induction: balloon dilation (Duffy)     Indications for induction:        Augmentation: oxytocin     Indications for augmentation:       Labor complications: None     Additional complications:          Cervical ripening:                     Delivery:      Episiotomy: None     Indication for Episiotomy:       Perineal Lacerations: 3rd Repaired:  Yes   Periurethral Laceration:   Repaired:     Labial Laceration:   Repaired:     Sulcus Laceration:   Repaired:     Vaginal Laceration:   Repaired:     Cervical Laceration:   Repaired:     Repair suture:       Repair # of packets: 2     Last Value - EBL - Nursing (mL):       Sum - EBL - Nursing (mL): 0     Last Value - EBL - Anesthesia (mL):      Calculated QBL (mL): 428     Running total QBL (mL): 577     Vaginal Sweep Performed: Yes     Surgicount Correct: Yes     Vaginal Packing: No Quantity:        Other providers:       Anesthesia    Method: Epidural          Details (if applicable):  Trial of Labor      Categorization:      Priority:     Indications for :     Incision Type:       Additional  information:  Forceps:    Vacuum:    Breech:    Observed anomalies    Other (Comments):       Rita Gil MD  Obstetrics & Gynecology, PGY-1          [1]   Patient Active Problem List  Diagnosis    Family history of colon cancer in father    Family history of breast cancer    GERD (gastroesophageal reflux disease)    Vocal nodules in adults    B12 deficiency    Normal pregnancy in third trimester    History of loop electrosurgical excision procedure (LEEP) of cervix affecting pregnancy in third trimester    Fetal growth restriction antepartum    Maternal care for other (suspected) fetal abnormality and damage, fetal cardiac anomalies, not applicable or unspecified    Encounter for induction of labor     (spontaneous vaginal delivery)    History of third degree perineal laceration

## 2025-06-03 NOTE — PROGRESS NOTES
Late LABOR PROGRESS NOTE    S:  MD to bedside for routine SVE. Complaints: No. Epidural working: Yes    O: Temp:  [97.9 °F (36.6 °C)-98.2 °F (36.8 °C)] 98.2 °F (36.8 °C)  Pulse:  [] 105  Resp:  [16-18] 18  SpO2:  [97 %-100 %] 100 %  BP: ()/(63-90) 137/90      FHT: 125 BPM/moderate beat to beat variability/+accels/-decels Cat 1 (reassuring)  CTX: q 2 minutes  SVE: 10/100/+1    TIMELINE:   1700: 1/70/-3  1930: 3   2130: 2  0100: SROM cl  0415: 10/100/+1    ASSESSMENT:   34 y.o.  at 38w3d, for IOL    FHT reassuring    Active Hospital Problems    Diagnosis  POA    *Encounter for induction of labor [Z34.90]  Not Applicable    Maternal care for other (suspected) fetal abnormality and damage, fetal cardiac anomalies, not applicable or unspecified [O35.BXX0]  Yes    Fetal growth restriction antepartum [O36.5990]  Yes    GERD (gastroesophageal reflux disease) [K21.9]  Yes      Resolved Hospital Problems   No resolved problems to display.         PLAN:    Labor management  Continue Close Maternal/Fetal Monitoring.   Plan to set up for delivery   Staff notified    Rita Gil MD.

## 2025-06-03 NOTE — LACTATION NOTE
06/03/25 1300   Maternal Assessment   Breast Shape Bilateral:;wide   Areola Bilateral:;elastic   Nipples Bilateral:;everted;short   Maternal Infant Feeding   Maternal Emotional State tense;assist needed   Infant Positioning clutch/football;cross-cradle   Pain with Feeding no   Comfort Measures Before/During Feeding infant position adjusted;maternal position adjusted;suction broken using finger;latch adjusted   Nipple Shape After Feeding, Left Round   Nipple Shape After Feeding, Right Round   Latch Assistance yes     Mom preparing to feed baby upon LC arrival. LC assisted with dressing baby to a dry diaper and with latch and positioning. Adjusted mom's positioning with pillows and encouraged her to keep nipple at baby's nose level. Reminded mom to move fingers away from nipples to prevent baby from attaching shallowly. Pillows and rolled towels used for support. LC able to assist mom in latching deeply to the right breast in football hold for 20 minutes and left breast in cross cradle hold for 15 minutes. Baby unattached herself from both breasts. Good tugs and pulls seen and swallows heard.

## 2025-06-04 VITALS
HEART RATE: 77 BPM | RESPIRATION RATE: 18 BRPM | DIASTOLIC BLOOD PRESSURE: 76 MMHG | BODY MASS INDEX: 28.24 KG/M2 | SYSTOLIC BLOOD PRESSURE: 125 MMHG | TEMPERATURE: 98 F | HEIGHT: 64 IN | WEIGHT: 165.38 LBS | OXYGEN SATURATION: 99 %

## 2025-06-04 LAB
ABSOLUTE EOSINOPHIL (OHS): 0.21 K/UL
ABSOLUTE MONOCYTE (OHS): 0.87 K/UL (ref 0.3–1)
ABSOLUTE NEUTROPHIL COUNT (OHS): 7.64 K/UL (ref 1.8–7.7)
BASOPHILS # BLD AUTO: 0.06 K/UL
BASOPHILS NFR BLD AUTO: 0.5 %
ERYTHROCYTE [DISTWIDTH] IN BLOOD BY AUTOMATED COUNT: 11.7 % (ref 11.5–14.5)
ESTROGEN SERPL-MCNC: NORMAL PG/ML
HCT VFR BLD AUTO: 31.9 % (ref 37–48.5)
HGB BLD-MCNC: 11.1 GM/DL (ref 12–16)
IMM GRANULOCYTES # BLD AUTO: 0.1 K/UL (ref 0–0.04)
IMM GRANULOCYTES NFR BLD AUTO: 0.9 % (ref 0–0.5)
INSULIN SERPL-ACNC: NORMAL U[IU]/ML
LAB AP DIAGNOSIS CATEGORY: NORMAL
LAB AP GROSS DESCRIPTION: NORMAL
LAB AP PERFORMING LOCATION(S): NORMAL
LAB AP REPORT FOOTNOTES: NORMAL
LYMPHOCYTES # BLD AUTO: 2.09 K/UL (ref 1–4.8)
MCH RBC QN AUTO: 34.7 PG (ref 27–31)
MCHC RBC AUTO-ENTMCNC: 34.8 G/DL (ref 32–36)
MCV RBC AUTO: 100 FL (ref 82–98)
NUCLEATED RBC (/100WBC) (OHS): 0 /100 WBC
PLATELET # BLD AUTO: 182 K/UL (ref 150–450)
PMV BLD AUTO: 9.9 FL (ref 9.2–12.9)
RBC # BLD AUTO: 3.2 M/UL (ref 4–5.4)
RELATIVE EOSINOPHIL (OHS): 1.9 %
RELATIVE LYMPHOCYTE (OHS): 19.1 % (ref 18–48)
RELATIVE MONOCYTE (OHS): 7.9 % (ref 4–15)
RELATIVE NEUTROPHIL (OHS): 69.7 % (ref 38–73)
WBC # BLD AUTO: 10.97 K/UL (ref 3.9–12.7)

## 2025-06-04 PROCEDURE — 25000003 PHARM REV CODE 250

## 2025-06-04 PROCEDURE — 36415 COLL VENOUS BLD VENIPUNCTURE: CPT | Performed by: OBSTETRICS & GYNECOLOGY

## 2025-06-04 PROCEDURE — 85025 COMPLETE CBC W/AUTO DIFF WBC: CPT | Performed by: OBSTETRICS & GYNECOLOGY

## 2025-06-04 RX ORDER — FENTANYL/BUPIVACAINE/NS/PF 2MCG/ML-.1
PLASTIC BAG, INJECTION (ML) INJECTION CONTINUOUS
Status: DISCONTINUED | OUTPATIENT
Start: 2025-06-04 | End: 2025-06-04

## 2025-06-04 RX ORDER — IBUPROFEN 600 MG/1
600 TABLET, FILM COATED ORAL EVERY 8 HOURS PRN
Qty: 30 TABLET | Refills: 0 | Status: SHIPPED | OUTPATIENT
Start: 2025-06-04

## 2025-06-04 RX ORDER — SODIUM CITRATE AND CITRIC ACID MONOHYDRATE 334; 500 MG/5ML; MG/5ML
30 SOLUTION ORAL
Status: DISCONTINUED | OUTPATIENT
Start: 2025-06-04 | End: 2025-06-04

## 2025-06-04 RX ORDER — DOCUSATE SODIUM 100 MG/1
200 CAPSULE, LIQUID FILLED ORAL 2 TIMES DAILY PRN
Qty: 60 CAPSULE | Refills: 0 | Status: SHIPPED | OUTPATIENT
Start: 2025-06-04

## 2025-06-04 RX ORDER — ACETAMINOPHEN 325 MG/1
650 TABLET ORAL EVERY 6 HOURS PRN
Qty: 30 TABLET | Refills: 0 | Status: SHIPPED | OUTPATIENT
Start: 2025-06-04

## 2025-06-04 RX ORDER — FAMOTIDINE 10 MG/ML
20 INJECTION, SOLUTION INTRAVENOUS
Status: DISCONTINUED | OUTPATIENT
Start: 2025-06-04 | End: 2025-06-04

## 2025-06-04 RX ADMIN — PRENATAL VIT W/ FE FUMARATE-FA TAB 27-0.8 MG 1 TABLET: 27-0.8 TAB at 07:06

## 2025-06-04 RX ADMIN — DOCUSATE SODIUM 200 MG: 100 CAPSULE, LIQUID FILLED ORAL at 12:06

## 2025-06-04 RX ADMIN — IBUPROFEN 600 MG: 600 TABLET ORAL at 12:06

## 2025-06-04 RX ADMIN — IBUPROFEN 600 MG: 600 TABLET ORAL at 05:06

## 2025-06-04 RX ADMIN — ACETAMINOPHEN 650 MG: 325 TABLET ORAL at 12:06

## 2025-06-04 RX ADMIN — IBUPROFEN 600 MG: 600 TABLET ORAL at 11:06

## 2025-06-04 RX ADMIN — DOCUSATE SODIUM 200 MG: 100 CAPSULE, LIQUID FILLED ORAL at 07:06

## 2025-06-04 RX ADMIN — ACETAMINOPHEN 650 MG: 325 TABLET ORAL at 05:06

## 2025-06-04 RX ADMIN — ACETAMINOPHEN 650 MG: 325 TABLET ORAL at 11:06

## 2025-06-04 RX ADMIN — POLYETHYLENE GLYCOL 3350 17 G: 17 POWDER, FOR SOLUTION ORAL at 07:06

## 2025-06-04 NOTE — PROGRESS NOTES
Mother Baby Care Guide reviewed. Pt verbalized understanding. Pt also verbalized understanding that she will check her BP at least once a day via Connected Mom when discharged. Pt also verbalized understanding that she will call/come into the [facility] for any SS of HTN or for any severe range BP.

## 2025-06-04 NOTE — LACTATION NOTE
Mom requested assistance with breastfeeding.    Encouraged awakening techniques, such as unswaddling/undressing, changing baby's diaper, and placing baby skin to skin. Asked mom if she knew how to hand express and she stated yes. Small drop of colostrum observed to left nipple. Assisted mom with providing pillow support and placed baby in football position. Instructed mom to apply nipple to baby's upper lip/nose and wait for baby to open mouth wide for deep latch. Baby latched and began heartily sucking with swallowing observed.  Baby required some gentle stroking of hands and feet to stay awake. Educated mom about importance of ensuring deep latch and watching for efficient sucks/swallowing.   Momr eported that latch felt like a pulling/tugging sensation as baby sucked. Mom requested that this RN demonstrate how to unlatch/relatch baby if needed. Showed mom how to place finger to corner of baby's mouth to break suction and then latch baby again deeper. Nipple observed to be round/elongated after feeding.  Much praise and support given.   Discharge teaching done with mom and dad. Both verbalized understanding.    [facility] - Mother & Baby ([facility])  Lactation Note - Mom    SUMMARY     Maternal Assessment    Breast Shape: Bilateral:, round  Areola: Bilateral:, elastic  Nipples: Bilateral:, everted  Left Nipple Symptoms: tender  Right Nipple Symptoms: tender      LATCH Score         Breasts WDL    Breast WDL: WDL  Left Nipple Symptoms: tender  Right Nipple Symptoms: tender    Maternal Infant Feeding    Maternal Preparation: breast care, hand hygiene  Maternal Emotional State: assist needed, relaxed  Infant Positioning: clutch/football  Signs of Milk Transfer: audible swallow, infant jaw motion present, suck/swallow ratio  Pain with Feeding: no  Comfort Measures Before/During Feeding: infant position adjusted, latch adjusted, maternal position adjusted, suction broken using finger  Milk Ejection Reflex: absent  Comfort  Measures Following Feeding: air-drying encouraged  Nipple Shape After Feeding, Left: round  Nipple Shape After Feeding, Right: Round  Latch Assistance: yes    Lactation Referrals    Community Referrals: outpatient lactation program, pediatric care provider, support group  Outpatient Lactation Program Lactation Follow-up Date/Time: call lac ctr prn  Pediatric Care Provider Lactation Follow-up Date/Time: follow up with peds within 1-2 days for wt check  Support Group Lactation Follow-up Date/Time: community resources given in handout    Lactation Interventions    Breast Care: Breastfeeding: open to air  Breastfeeding Assistance: assisted with positioning, feeding cue recognition promoted, feeding on demand promoted, feeding session observed, hand expression verified, infant latch-on verified, infant stimulated to wakeful state, infant suck/swallow verified, support offered  Breast Care: Breastfeeding: open to air  Breastfeeding Assistance: assisted with positioning, feeding cue recognition promoted, feeding on demand promoted, feeding session observed, hand expression verified, infant latch-on verified, infant stimulated to wakeful state, infant suck/swallow verified, support offered  Fetal Wellbeing Promotion: intake and output monitored  Breastfeeding Support: diary/feeding log utilized, encouragement provided, infant-mother separation minimized, lactation counseling provided, maternal hydration promoted, maternal nutrition promoted, maternal rest encouraged       Breastfeeding Session    Infant Positioning: clutch/football  Signs of Milk Transfer: audible swallow, infant jaw motion present, suck/swallow ratio    Maternal Information

## 2025-06-04 NOTE — DISCHARGE SUMMARY
"Delivery Discharge Summary  Obstetrics      Primary OB Clinician: Kayce Fong MD      Admission date: 2025  Discharge date: 2025    Disposition: To home, self care    Discharge Diagnosis List:    Problem List[1]    Procedure:     Hospital Course:  Shira Ashton is a 34 y.o. now , PPD #2 who was admitted on 2025 at 38+3. Patient was subsequently admitted to labor and delivery unit with signed consents.     Labor course complicated by gestational htn resulted in  of live infant girl. 3rd degree laceration.    Please see delivery note for further details. Her postpartum course was uncomplicated.    VSS. Few mild range blood pressures in labor. Normal hypertension labs.  On discharge day, patient's pain is controlled with oral pain medications. Pt is tolerating ambulation without SOB or CP, and regular diet without N/V. Passing flatus has not had bowel movement since delivery. Pericare discussed. Reports lochia is mild. Denies any HA, vision changes, F/C, LE swelling. Denies any breast pain/soreness. She is breastfeeding infant. Natural family planning for contraception. S&S of pre eclampsia reinforced. She will monitor b/p at home as discussed. F/U 1 week for B/P check then 6 weeks for PP visit.    Pt in stable condition and ready for discharge. She has been instructed to start and/or continue medications and follow up with her obstetrics provider as listed below.    /76 (BP Location: Left arm, Patient Position: Lying)   Pulse 77   Temp 98 °F (36.7 °C) (Oral)   Resp 18   Ht 5' 4" (1.626 m)   Wt 75 kg (165 lb 5.5 oz)   LMP 2024   SpO2 99%   Breastfeeding Yes   BMI 28.38 kg/m²     Gen: A&O x 4, NAD  CV: normal HR  Lungs: normal resp effort  Breasts: bilaterally soft, non-tender, nipples intact   Abdomen: soft, non-tender, uterus firm at U - 1 fb  Perineum: approximated, no edema   Lochia: minimal rubra  Ext: bilaterally no pedal edema, without signs of " "DVT    Pertinent studies:  CBC  Recent Labs   Lab 25  1639 25  0551   WBC 8.37 10.97   HGB 13.5 11.1*   HCT 38.6 31.9*   MCV 99* 100*    182        Immunization History   Administered Date(s) Administered    COVID-19, MRNA, LN-S, PF (MODERNA FULL 0.5 ML DOSE) 2021    COVID-19, MRNA, LN-S, PF (Pfizer) (Purple Cap) 2021, 2021    Tdap 2025            6/3/2025     9:30 PM   [place]  Depression Scale   I have been able to laugh and see the funny side of things. 0   I have looked forward with enjoyment to things. 0   I have blamed myself unnecessarily when things went wrong. 1   I have been anxious or worried for no good reason. 1   I have felt scared or panicky for no good reason. 1   Things have been getting on top of me. 1   I have been so unhappy that I have had difficulty sleeping. 0   I have felt sad or miserable. 0   I have been so unhappy that I have been crying. 0   The thought of harming myself has occurred to me. 0        Delivery:    Episiotomy: None   Lacerations: 3rd   Repair suture:     Repair # of packets: 2   Blood loss (ml):       Birth information:  YOB: 2024   Time of birth: 4:49 AM   Sex: female   Delivery type: Vaginal, Spontaneous   Gestational Age: 38w3d     Measurements    Weight: 2820 g  Weight (lbs): 6 lb 3.5 oz  Length: 48.3 cm  Length (in): 19"  Head circumference: 32 cm  Chest circumference: 30.5 cm         Delivery Clinician: Delivery Providers    Delivering clinician: Kayce Fong MD   Provider Role    Rita Gil MD Resident    Lonny, Yomaira, RN Registered Nurse    Jaimie Crowder RN Registered Nurse    Sherry Pinedo ST Scrub Person             Additional  information:  Forceps:    Vacuum:    Breech:    Observed anomalies      Living?:     Apgars    Living status: Living  Apgar Component Scores:  1 min.:  5 min.:  10 min.:  15 min.:  20 min.:    Skin color:  0  1       Heart rate:  2  2       Reflex " irritability:  2  2       Muscle tone:  2  2       Respiratory effort:  2  2       Total:  8  9       Apgars assigned by: NICU         Placenta: Delivered:       appearance    Patient Instructions:   Current Discharge Medication List        START taking these medications    Details   acetaminophen (TYLENOL) 325 MG tablet Take 2 tablets (650 mg total) by mouth every 6 (six) hours as needed for Pain.  Qty: 30 tablet, Refills: 0      docusate sodium (COLACE) 100 MG capsule Take 2 capsules (200 mg total) by mouth 2 (two) times daily as needed for Constipation.  Qty: 60 capsule, Refills: 0      ibuprofen (ADVIL,MOTRIN) 600 MG tablet Take 1 tablet (600 mg total) by mouth every 8 (eight) hours as needed for Pain.  Qty: 30 tablet, Refills: 0           CONTINUE these medications which have NOT CHANGED    Details   PNV no.95/ferrous fum/folic ac (PRENATAL ORAL) Take by mouth.             Discharge Procedure Orders   Diet Adult Regular     Lifting restrictions     Pelvic Rest     Notify your health care provider if you experience any of the following:  temperature >100.4     Notify your health care provider if you experience any of the following:  persistent nausea and vomiting or diarrhea     Notify your health care provider if you experience any of the following:  severe uncontrolled pain     Notify your health care provider if you experience any of the following:  redness, tenderness, or signs of infection (pain, swelling, redness, odor or green/yellow discharge around incision site)     Notify your health care provider if you experience any of the following:  difficulty breathing or increased cough     Notify your health care provider if you experience any of the following:  severe persistent headache     Notify your health care provider if you experience any of the following:  worsening rash     Notify your health care provider if you experience any of the following:  persistent dizziness, light-headedness, or visual  disturbances     Notify your health care provider if you experience any of the following:  increased confusion or weakness        Follow-up Information       Kayce Fong MD. Schedule an appointment as soon as possible for a visit in 1 week(s).    Specialty: Obstetrics and Gynecology  Why: blood pressure check  Contact information:  [address] [facility] [id number] [phone]               Kayce Fong MD. Schedule an appointment as soon as possible for a visit in 6 week(s).    Specialty: Obstetrics and Gynecology  Why: for routine postpartum visit  Contact information:  [address] [facility] [id number] [phone]                              Sonya Jose CNM         [1]   Patient Active Problem List  Diagnosis    Family history of colon cancer in father    Family history of breast cancer    GERD (gastroesophageal reflux disease)    Vocal nodules in adults    B12 deficiency    Normal pregnancy in third trimester    History of loop electrosurgical excision procedure (LEEP) of cervix affecting pregnancy in third trimester     (spontaneous vaginal delivery)    Type 3b perineal laceration    Gestational hypertension

## 2025-06-04 NOTE — PLAN OF CARE
Pt ambulating, voiding, and passing flatus. Pt tolerating PO well and no SS of distress at this time. Pt's pain well controlled throughout shift by oral pain medication. Pts bleeding has been light throughout shift. Fundus is firm. Mother baby care guide reviewed. All questions answered. Pt verbalized understanding to follow up with OB X 1 week for BP check and X 6 weeks for PP visit. Patient declined medications d/t having medications already at home. Reviewed medication list, when to call provider, and SS of infection. Pt stable at this time. ID band verified.

## 2025-06-05 ENCOUNTER — PATIENT MESSAGE (OUTPATIENT)
Dept: OBSTETRICS AND GYNECOLOGY | Facility: OTHER | Age: 35
End: 2025-06-05
Payer: COMMERCIAL

## 2025-06-09 ENCOUNTER — PATIENT MESSAGE (OUTPATIENT)
Dept: OBSTETRICS AND GYNECOLOGY | Facility: CLINIC | Age: 35
End: 2025-06-09
Payer: COMMERCIAL

## 2025-06-11 NOTE — PHYSICIAN QUERY
Please further specify the documentation of uterine atony with bleeding:  Postpartum uterine atony without hemorrhage

## 2025-06-30 ENCOUNTER — PATIENT MESSAGE (OUTPATIENT)
Dept: OBSTETRICS AND GYNECOLOGY | Facility: CLINIC | Age: 35
End: 2025-06-30
Payer: COMMERCIAL

## 2025-07-15 ENCOUNTER — PATIENT MESSAGE (OUTPATIENT)
Dept: OBSTETRICS AND GYNECOLOGY | Facility: CLINIC | Age: 35
End: 2025-07-15

## 2025-07-15 ENCOUNTER — POSTPARTUM VISIT (OUTPATIENT)
Dept: OBSTETRICS AND GYNECOLOGY | Facility: CLINIC | Age: 35
End: 2025-07-15
Payer: COMMERCIAL

## 2025-07-15 VITALS
DIASTOLIC BLOOD PRESSURE: 62 MMHG | WEIGHT: 149.25 LBS | HEIGHT: 64 IN | BODY MASS INDEX: 25.48 KG/M2 | SYSTOLIC BLOOD PRESSURE: 98 MMHG

## 2025-07-15 PROCEDURE — 88175 CYTOPATH C/V AUTO FLUID REDO: CPT | Mod: TC | Performed by: OBSTETRICS & GYNECOLOGY

## 2025-07-15 PROCEDURE — 0503F POSTPARTUM CARE VISIT: CPT | Mod: CPTII,S$GLB,, | Performed by: OBSTETRICS & GYNECOLOGY

## 2025-07-15 PROCEDURE — 99999 PR PBB SHADOW E&M-EST. PATIENT-LVL III: CPT | Mod: PBBFAC,,, | Performed by: OBSTETRICS & GYNECOLOGY

## 2025-07-15 RX ORDER — LACTIC ACID, L-, CITRIC ACID MONOHYDRATE, AND POTASSIUM BITARTRATE 90; 50; 20 MG/5G; MG/5G; MG/5G
1 GEL VAGINAL
Qty: 30 G | Refills: 6 | Status: SHIPPED | OUTPATIENT
Start: 2025-07-15

## 2025-07-15 NOTE — PROGRESS NOTES
Subjective:       Patient ID: Shira Ashton is a 34 y.o. female.    Chief Complaint:  Postpartum Care      History of Present Illness       Shira Ashton is a 34 y.o. female  presents for a postpartum visit.    Delivery:  by: me  Pregnancy complications: FGR, history of LEEP  Delivery complications: 3rd degree laceration  Postpartum complications: no  Breastfeeding: yes  Contraception: phexxi   Postpartum Depression: no    Her last pap was normal one year ago; history of LEEP for MARGARITA 3    Review of Systems  Review of Systems   Constitutional:  Negative for appetite change.   Eyes:  Negative for visual disturbance.   Respiratory:  Negative for cough and shortness of breath.    Cardiovascular:  Negative for chest pain.   Gastrointestinal:  Negative for abdominal pain, constipation and diarrhea.   Genitourinary:  Negative for difficulty urinating, dysuria, frequency, genital sores, pelvic pain, vaginal bleeding, vaginal discharge and vaginal pain.   Neurological:  Negative for dizziness, light-headedness and headaches.   Psychiatric/Behavioral:  Negative for dysphoric mood.            Objective:   Physical Exam  Vitals and nursing note reviewed.   Constitutional:       Appearance: She is well-developed.   Pulmonary:      Effort: Pulmonary effort is normal.   Abdominal:      Palpations: Abdomen is soft.   Genitourinary:     Labia:         Right: No rash, tenderness, lesion or injury.         Left: No rash, tenderness, lesion or injury.       Vagina: Normal. No signs of injury and foreign body. No vaginal discharge, erythema, tenderness or bleeding.      Cervix: No cervical motion tenderness, discharge or friability.      Uterus: Not deviated, not enlarged, not fixed and not tender.       Adnexa:         Right: No mass, tenderness or fullness.          Left: No mass, tenderness or fullness.        Comments: Urethra: normal appearing urethra with no masses, tenderness or lesions  Urethral meatus: normal size,  anterior vaginal wall with no prolapse, no lesions  Neurological:      Mental Status: She is alert and oriented to person, place, and time.   Psychiatric:         Behavior: Behavior normal.         Thought Content: Thought content normal.         Judgment: Judgment normal.         Chaperoned by: n/a     Assessment:        1. Postpartum care and examination               Plan:        No orders of the defined types were placed in this encounter.    Medications Ordered This Encounter   Medications    lactic acid-citric-potassium (PHEXXI) 1.8-1-0.4 % Gel       Breastfeeding: yes  Postpartum Depression: no  Contraception: phexxi  Pap smear: done today    Follow up for annual exam    Kayce Fong MD

## 2025-07-17 LAB
INSULIN SERPL-ACNC: NORMAL U[IU]/ML
LAB AP BETHESDA CATEGORY: NORMAL
LAB AP CLINICAL FINDINGS: NORMAL
LAB AP CONTRACEPTIVES: NORMAL
LAB AP GYN ADDITIONAL FINDINGS: NORMAL
LAB AP LMP DATE: NORMAL
LAB AP OCHS PAP SPECIMEN ADEQUACY: NORMAL
LAB AP OHS PAP INTERPRETATION: NORMAL
LAB AP PAP DISCLAIMER COMMENTS: NORMAL
LAB AP PAP ESTROGEN REPLACEMENT THERAPY: NORMAL
LAB AP PAP PMP: NORMAL
LAB AP PAP PREVIOUS BX: NORMAL
LAB AP PAP PRIOR TREATMENT: NORMAL
LAB AP PERFORMING LOCATION(S): NORMAL

## 2025-07-28 ENCOUNTER — PATIENT OUTREACH (OUTPATIENT)
Dept: ADMINISTRATIVE | Facility: HOSPITAL | Age: 35
End: 2025-07-28
Payer: COMMERCIAL

## 2025-07-28 ENCOUNTER — PATIENT MESSAGE (OUTPATIENT)
Dept: ADMINISTRATIVE | Facility: HOSPITAL | Age: 35
End: 2025-07-28
Payer: COMMERCIAL

## 2025-07-28 NOTE — PROGRESS NOTES
Population Health Chart Review & Patient Outreach Details      Additional [facility] Notes:      BP Readings from Last 3 Encounters:   07/15/25 98/62   06/04/25 125/76   05/27/25 112/74         Non-compliant report chart audits for HYPERTENSION MANAGEMENT     Outreach to patient in reference to hypertension management             DUE FOR OFFICE VISIT.  LAST SEEN >12 MONTHS             Updates Requested / Reviewed:        Health Maintenance Topics Overdue:        VB Score: 1                                 Health Maintenance Topic(s) Outreach Outcomes & Actions Taken:    Blood Pressure - Outreach Outcomes & Actions Taken  : msg    Primary Care Appt - Outreach Outcomes & Actions Taken  : msg

## 2025-08-14 ENCOUNTER — TELEPHONE (OUTPATIENT)
Dept: FAMILY MEDICINE | Facility: CLINIC | Age: 35
End: 2025-08-14
Payer: COMMERCIAL

## 2025-08-20 ENCOUNTER — PATIENT MESSAGE (OUTPATIENT)
Dept: ADMINISTRATIVE | Facility: HOSPITAL | Age: 35
End: 2025-08-20
Payer: COMMERCIAL